# Patient Record
Sex: FEMALE | Race: BLACK OR AFRICAN AMERICAN | NOT HISPANIC OR LATINO | Employment: OTHER | URBAN - METROPOLITAN AREA
[De-identification: names, ages, dates, MRNs, and addresses within clinical notes are randomized per-mention and may not be internally consistent; named-entity substitution may affect disease eponyms.]

---

## 2017-01-20 RX ORDER — ATORVASTATIN CALCIUM 10 MG/1
10 TABLET, FILM COATED ORAL DAILY
Qty: 90 TABLET | Refills: 2 | Status: SHIPPED | OUTPATIENT
Start: 2017-01-20 | End: 2017-11-14 | Stop reason: SDUPTHER

## 2017-01-20 RX ORDER — ALLOPURINOL 100 MG/1
100 TABLET ORAL DAILY
Qty: 90 TABLET | Refills: 2 | Status: SHIPPED | OUTPATIENT
Start: 2017-01-20 | End: 2017-06-28 | Stop reason: SDUPTHER

## 2017-01-20 NOTE — TELEPHONE ENCOUNTER
----- Message from Alejandrina Mendoza sent at 1/19/2017 12:32 PM EST -----  Contact: [phone]  SHE WANTS TO GET A 90 DAY.     allopurinol (ZYLOPRIM) 100 MG tablet 90 tablet 1 12/5/2016      Sig - Route: Take 1 tablet by mouth Daily         atorvastatin (LIPITOR) 10 MG tablet 90 tablet 1 12/19/2016      Sig - Route: Take 1 tablet by mouth Daily. -     [id number] - [address] [phone] [facility] [phone]     Unable to leave message,phone rang several times. Sent Allopurinol and Atorvastatin to pharmacy

## 2017-01-31 ENCOUNTER — TELEPHONE (OUTPATIENT)
Dept: GASTROENTEROLOGY | Facility: CLINIC | Age: 67
End: 2017-01-31

## 2017-02-03 NOTE — TELEPHONE ENCOUNTER
Call to pt.  Advise per Dr Aly that all of the polyp(s) bx showed adenomatous change.  This is not cancerous, but is considered potentially precancerous.  F/u c/s in 3 yrs advised.  Pt verb understanding.    Message to Jennifer BANUELOS for 12/28/19 c/s recall.

## 2017-02-10 ENCOUNTER — OFFICE VISIT (OUTPATIENT)
Dept: FAMILY MEDICINE CLINIC | Facility: CLINIC | Age: 67
End: 2017-02-10

## 2017-02-10 VITALS
WEIGHT: 209.8 LBS | DIASTOLIC BLOOD PRESSURE: 78 MMHG | HEIGHT: 67 IN | BODY MASS INDEX: 32.93 KG/M2 | OXYGEN SATURATION: 97 % | TEMPERATURE: 98.5 F | HEART RATE: 85 BPM | RESPIRATION RATE: 16 BRPM | SYSTOLIC BLOOD PRESSURE: 130 MMHG

## 2017-02-10 DIAGNOSIS — H10.32 ACUTE CONJUNCTIVITIS OF LEFT EYE, UNSPECIFIED ACUTE CONJUNCTIVITIS TYPE: Primary | ICD-10-CM

## 2017-02-10 DIAGNOSIS — J01.00 ACUTE MAXILLARY SINUSITIS, RECURRENCE NOT SPECIFIED: ICD-10-CM

## 2017-02-10 PROCEDURE — 99213 OFFICE O/P EST LOW 20 MIN: CPT | Performed by: NURSE PRACTITIONER

## 2017-02-10 RX ORDER — AMOXICILLIN 875 MG/1
875 TABLET, COATED ORAL 2 TIMES DAILY
Qty: 14 TABLET | Refills: 0 | Status: SHIPPED | OUTPATIENT
Start: 2017-02-10 | End: 2017-02-18

## 2017-02-10 NOTE — PATIENT INSTRUCTIONS
Start amoxicillin 875mg 2x day for 7 days.   May take mucinex OTC as needed for cough.   Start allegra daily for about 2 weeks.   zaditor eye drops for allergic conjunctivitis, if no improvement in 2-3 days, call office will consider abx drop.   Increase fluid intake, get plenty of rest.   Patient agrees with plan of care and understands instructions. Call if worsening symptoms or any problems or concerns.

## 2017-02-10 NOTE — PROGRESS NOTES
Subjective   Danielle Torres is a 66 y.o. female.     History of Present Illness   C/o congestion, headache, started about 2 weeks ago, got better, then came back, she thinks she has a sinus infection. She has sinus pressure, nasal drainage, sore throat, sore neck. She states she has eye drainage, she has morning crusting, started last weekend, she placed a warm cloth on her eye which helped. She states it was both eyes, but now just the left eye. She get does get seasonal allergies She denies itching and redness. She was watery drainage in the left eye also. Denies change in vision. She does have a cough, productive, no color change. She has had a fever last week but has since resolved. She drinks a lot of water.   The following portions of the patient's history were reviewed and updated as appropriate: allergies, current medications, past family history, past medical history, past social history, past surgical history and problem list.    Review of Systems   Constitutional: Negative for chills, diaphoresis and fever.   HENT: Positive for congestion, rhinorrhea, sinus pressure and sore throat. Negative for ear pain.    Eyes: Positive for discharge (crusting). Negative for photophobia, pain, redness, itching and visual disturbance.   Respiratory: Positive for cough. Negative for shortness of breath.    Cardiovascular: Negative for chest pain.   Musculoskeletal: Negative for arthralgias and myalgias.   Skin: Negative for pallor.   Allergic/Immunologic: Positive for environmental allergies.   Neurological: Positive for headaches.   All other systems reviewed and are negative.      Objective   Physical Exam   Constitutional: She is oriented to person, place, and time. She appears well-developed and well-nourished.   HENT:   Head: Normocephalic.   Right Ear: Tympanic membrane and ear canal normal.   Left Ear: Tympanic membrane and ear canal normal.   Nose: Right sinus exhibits maxillary sinus tenderness. Left sinus  exhibits maxillary sinus tenderness.   Mouth/Throat: Uvula is midline, oropharynx is clear and moist and mucous membranes are normal.   Eyes: Pupils are equal, round, and reactive to light.   Neck: Neck supple.   Cardiovascular: Normal rate, regular rhythm and normal heart sounds.    Pulmonary/Chest: Effort normal and breath sounds normal. No respiratory distress. She has no wheezes.   Musculoskeletal: Normal range of motion.   Lymphadenopathy:     She has no cervical adenopathy.   Neurological: She is alert and oriented to person, place, and time.   Skin: Skin is warm and dry.   Psychiatric: She has a normal mood and affect. Her behavior is normal. Judgment and thought content normal.   Nursing note and vitals reviewed.      Assessment/Plan   Danielle was seen today for uri.    Diagnoses and all orders for this visit:    Acute conjunctivitis of left eye, unspecified acute conjunctivitis type    Acute maxillary sinusitis, recurrence not specified    Other orders  -     amoxicillin (AMOXIL) 875 MG tablet; Take 1 tablet by mouth 2 (Two) Times a Day for 7 days.      Start amoxicillin 875mg 2x day for 7 days.   May take mucinex OTC as needed for cough.   Start allegra daily for about 2 weeks.   zaditor eye drops for allergic conjunctivitis, if no improvement in 2-3 days, call office will consider abx drop.   Increase fluid intake, get plenty of rest.   Patient agrees with plan of care and understands instructions. Call if worsening symptoms or any problems or concerns.

## 2017-02-18 ENCOUNTER — OFFICE VISIT (OUTPATIENT)
Dept: FAMILY MEDICINE CLINIC | Facility: CLINIC | Age: 67
End: 2017-02-18

## 2017-02-18 VITALS
RESPIRATION RATE: 18 BRPM | BODY MASS INDEX: 32.79 KG/M2 | DIASTOLIC BLOOD PRESSURE: 74 MMHG | TEMPERATURE: 99.8 F | HEART RATE: 79 BPM | SYSTOLIC BLOOD PRESSURE: 132 MMHG | OXYGEN SATURATION: 98 % | WEIGHT: 208.9 LBS | HEIGHT: 67 IN

## 2017-02-18 DIAGNOSIS — E78.5 HYPERLIPIDEMIA, UNSPECIFIED HYPERLIPIDEMIA TYPE: ICD-10-CM

## 2017-02-18 DIAGNOSIS — J06.9 ACUTE URI: Primary | ICD-10-CM

## 2017-02-18 DIAGNOSIS — I10 ESSENTIAL HYPERTENSION: ICD-10-CM

## 2017-02-18 DIAGNOSIS — K21.9 GASTROESOPHAGEAL REFLUX DISEASE WITHOUT ESOPHAGITIS: ICD-10-CM

## 2017-02-18 PROBLEM — M1A.00X0 IDIOPATHIC CHRONIC GOUT: Status: ACTIVE | Noted: 2017-02-18

## 2017-02-18 PROCEDURE — 99213 OFFICE O/P EST LOW 20 MIN: CPT | Performed by: INTERNAL MEDICINE

## 2017-02-18 RX ORDER — PROMETHAZINE HYDROCHLORIDE AND CODEINE PHOSPHATE 6.25; 1 MG/5ML; MG/5ML
5 SYRUP ORAL EVERY 4 HOURS PRN
Qty: 118 ML | Refills: 0 | Status: SHIPPED | OUTPATIENT
Start: 2017-02-18 | End: 2018-01-05 | Stop reason: SDUPTHER

## 2017-02-18 RX ORDER — AMOXICILLIN 875 MG/1
875 TABLET, COATED ORAL 2 TIMES DAILY
Qty: 20 TABLET | Refills: 0 | Status: SHIPPED | OUTPATIENT
Start: 2017-02-18 | End: 2018-01-05 | Stop reason: SDUPTHER

## 2017-02-18 RX ORDER — AMLODIPINE BESYLATE 10 MG/1
TABLET ORAL
Qty: 90 TABLET | Refills: 0 | Status: SHIPPED | OUTPATIENT
Start: 2017-02-18 | End: 2017-06-27 | Stop reason: SDUPTHER

## 2017-02-18 NOTE — PROGRESS NOTES
Subjective   Danielle Torres is a 66 y.o. female.     History of Present Illness   Patient came in with symptoms of a URI.  She did have bilateral sinus tenderness and was given Amoxil.  She will follow-up in 4 days not better.    Much of this encounter note is an electronic transcription/translation of spoken language to printed text.  The electronic translation of spoken language may permit erroneous, or at times, nonsensical words or phrases to be inadvertently transcribed.  Although I have reviewed the note for such errors, some may still exist.  The following portions of the patient's history were reviewed and updated as appropriate: allergies, current medications, past family history, past medical history, past social history, past surgical history and problem list..    Review of Systems   Constitutional: Negative for fatigue and fever.   HENT: Positive for congestion and sinus pressure. Negative for trouble swallowing.    Eyes: Negative for discharge and visual disturbance.   Respiratory: Negative for choking and shortness of breath.    Cardiovascular: Negative for chest pain and palpitations.   Gastrointestinal: Negative for abdominal pain and blood in stool.   Endocrine: Negative.    Genitourinary: Negative for genital sores and hematuria.   Musculoskeletal: Negative for gait problem and joint swelling.   Skin: Negative for color change, pallor, rash and wound.   Allergic/Immunologic: Positive for environmental allergies. Negative for immunocompromised state.   Neurological: Negative for facial asymmetry and speech difficulty.   Psychiatric/Behavioral: Negative for hallucinations and suicidal ideas.       Objective   Physical Exam   Constitutional: She is oriented to person, place, and time. She appears well-developed and well-nourished.   HENT:   Head: Normocephalic.   Bilateral maxillary tenderness   Eyes: Conjunctivae are normal. Pupils are equal, round, and reactive to light.   Neck: Normal range of  motion. Neck supple.   Cardiovascular: Normal rate, regular rhythm and normal heart sounds.    Pulmonary/Chest: Effort normal and breath sounds normal.   Abdominal: Soft. Bowel sounds are normal.   Musculoskeletal: Normal range of motion.   Neurological: She is alert and oriented to person, place, and time.   Skin: Skin is warm and dry.   Psychiatric: She has a normal mood and affect. Her behavior is normal. Judgment and thought content normal.   Nursing note and vitals reviewed.      Assessment/Plan   Problems Addressed this Visit        Cardiovascular and Mediastinum    Hypertension    Hyperlipidemia       Digestive    Gastroesophageal reflux disease without esophagitis      Other Visit Diagnoses     Acute URI    -  Primary    Relevant Medications    amoxicillin (AMOXIL) 875 MG tablet

## 2017-02-23 ENCOUNTER — TELEPHONE (OUTPATIENT)
Dept: FAMILY MEDICINE CLINIC | Facility: CLINIC | Age: 67
End: 2017-02-23

## 2017-02-23 NOTE — TELEPHONE ENCOUNTER
----- Message from Rahat Arciniega MD sent at 2/23/2017  3:36 PM EST -----  Contact: [phone]  Continue see what patient needs  ----- Message -----     From: Joanna Pope MA     Sent: 2/23/2017  12:27 PM       To: Rahat Arciniega MD    2nd round antibotitcs feels a little better but still coughing up think phleem orange? What do you want her to do????  ----- Message -----     From: Bell De La Rosa     Sent: 2/23/2017  12:10 PM       To: Joanna Pope MA    PLEASE CALL PT      Per rls called pt f/u next week if no better cont. Med rest/fluids

## 2017-06-26 ENCOUNTER — LAB (OUTPATIENT)
Dept: FAMILY MEDICINE CLINIC | Facility: CLINIC | Age: 67
End: 2017-06-26

## 2017-06-26 DIAGNOSIS — I10 ESSENTIAL HYPERTENSION: Primary | ICD-10-CM

## 2017-06-26 DIAGNOSIS — E78.5 HYPERLIPIDEMIA, UNSPECIFIED HYPERLIPIDEMIA TYPE: ICD-10-CM

## 2017-06-26 DIAGNOSIS — R73.9 ELEVATED BLOOD SUGAR: ICD-10-CM

## 2017-06-26 LAB
ALBUMIN SERPL-MCNC: 4.3 G/DL (ref 3.5–5.2)
ALBUMIN/GLOB SERPL: 1.3 G/DL
ALP SERPL-CCNC: 60 U/L (ref 39–117)
ALT SERPL W P-5'-P-CCNC: 47 U/L (ref 1–33)
ANION GAP SERPL CALCULATED.3IONS-SCNC: 15.7 MMOL/L
AST SERPL-CCNC: 30 U/L (ref 1–32)
BILIRUB SERPL-MCNC: 0.3 MG/DL (ref 0.1–1.2)
BUN BLD-MCNC: 16 MG/DL (ref 8–23)
BUN/CREAT SERPL: 18 (ref 7–25)
CALCIUM SPEC-SCNC: 9.4 MG/DL (ref 8.6–10.5)
CHLORIDE SERPL-SCNC: 100 MMOL/L (ref 98–107)
CHOLEST SERPL-MCNC: 219 MG/DL (ref 0–200)
CO2 SERPL-SCNC: 23.3 MMOL/L (ref 22–29)
CREAT BLD-MCNC: 0.89 MG/DL (ref 0.57–1)
GFR SERPL CREATININE-BSD FRML MDRD: 77 ML/MIN/1.73
GLOBULIN UR ELPH-MCNC: 3.3 GM/DL
GLUCOSE BLD-MCNC: 103 MG/DL (ref 65–99)
HBA1C MFR BLD: 6.1 % (ref 4.8–5.6)
HDLC SERPL-MCNC: 51 MG/DL (ref 40–60)
LDLC SERPL CALC-MCNC: 140 MG/DL (ref 0–100)
LDLC/HDLC SERPL: 2.75 {RATIO}
POTASSIUM BLD-SCNC: 4 MMOL/L (ref 3.5–5.2)
PROT SERPL-MCNC: 7.6 G/DL (ref 6–8.5)
SODIUM BLD-SCNC: 139 MMOL/L (ref 136–145)
TRIGL SERPL-MCNC: 140 MG/DL (ref 0–150)
VLDLC SERPL-MCNC: 28 MG/DL (ref 5–40)

## 2017-06-26 PROCEDURE — 80061 LIPID PANEL: CPT | Performed by: NURSE PRACTITIONER

## 2017-06-26 PROCEDURE — 80053 COMPREHEN METABOLIC PANEL: CPT | Performed by: NURSE PRACTITIONER

## 2017-06-26 PROCEDURE — 83036 HEMOGLOBIN GLYCOSYLATED A1C: CPT | Performed by: NURSE PRACTITIONER

## 2017-06-27 RX ORDER — AMLODIPINE BESYLATE 10 MG/1
TABLET ORAL
Qty: 90 TABLET | Refills: 0 | Status: SHIPPED | OUTPATIENT
Start: 2017-06-27 | End: 2017-10-05 | Stop reason: SDUPTHER

## 2017-06-28 ENCOUNTER — TELEPHONE (OUTPATIENT)
Dept: FAMILY MEDICINE CLINIC | Facility: CLINIC | Age: 67
End: 2017-06-28

## 2017-06-28 RX ORDER — ALLOPURINOL 100 MG/1
100 TABLET ORAL DAILY
Qty: 90 TABLET | Refills: 2 | Status: SHIPPED | OUTPATIENT
Start: 2017-06-28 | End: 2017-10-20 | Stop reason: SDUPTHER

## 2017-06-28 NOTE — TELEPHONE ENCOUNTER
Pt informed    ---- Message from JACKLYN Alvarado sent at 6/27/2017  1:06 PM EDT -----  A1C 6.1 prediabetic, glu 103, liver enzyme sl elevated but stable, chol sl elevated, pls make FU apt to discuss

## 2017-08-11 ENCOUNTER — TELEPHONE (OUTPATIENT)
Dept: FAMILY MEDICINE CLINIC | Facility: CLINIC | Age: 67
End: 2017-08-11

## 2017-08-11 ENCOUNTER — OFFICE VISIT (OUTPATIENT)
Dept: FAMILY MEDICINE CLINIC | Facility: CLINIC | Age: 67
End: 2017-08-11

## 2017-08-11 VITALS
OXYGEN SATURATION: 99 % | SYSTOLIC BLOOD PRESSURE: 138 MMHG | BODY MASS INDEX: 32.33 KG/M2 | HEIGHT: 67 IN | HEART RATE: 70 BPM | TEMPERATURE: 98.2 F | RESPIRATION RATE: 16 BRPM | DIASTOLIC BLOOD PRESSURE: 80 MMHG | WEIGHT: 206 LBS

## 2017-08-11 DIAGNOSIS — Z00.00 MEDICARE ANNUAL WELLNESS VISIT, INITIAL: Primary | ICD-10-CM
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
## 2017-08-11 DIAGNOSIS — Z23 NEED FOR VACCINATION: ICD-10-CM

## 2017-08-11 PROCEDURE — 90471 IMMUNIZATION ADMIN: CPT | Performed by: NURSE PRACTITIONER

## 2017-08-11 PROCEDURE — 99213 OFFICE O/P EST LOW 20 MIN: CPT | Performed by: NURSE PRACTITIONER

## 2017-08-11 PROCEDURE — 90715 TDAP VACCINE 7 YRS/> IM: CPT | Performed by: NURSE PRACTITIONER

## 2017-08-11 PROCEDURE — G0438 PPPS, INITIAL VISIT: HCPCS | Performed by: NURSE PRACTITIONER

## 2017-08-11 RX ORDER — MUPIROCIN CALCIUM 20 MG/G
CREAM TOPICAL 2 TIMES DAILY
Qty: 15 G | Refills: 0 | Status: SHIPPED | OUTPATIENT
Start: 2017-08-11 | End: 2018-01-05

## 2017-08-11 NOTE — PATIENT INSTRUCTIONS
Medicare Wellness  Personal Prevention Plan of Service     Date of Office Visit:  2017  Encounter Provider:  JACKLYN Longoria  Place of Service:  [facility]  Patient Name: Danielle Torres  :  1950    As part of the Medicare Wellness portion of your visit today, we are providing you with this personalized preventive plan of services (PPPS). This plan is based upon recommendations of the [place] Preventive Services Task Force (USPSTF) and the Advisory Committee on Immunization Practices (ACIP).    This lists the preventive care services that should be considered, and provides dates of when you are due. Items listed as completed are up-to-date and do not require any further intervention.    Health Maintenance   Topic Date Due   • INFLUENZA VACCINE  2017   • PNEUMOCOCCAL VACCINES (65+ LOW/MEDIUM RISK) (2 of 2 - PPSV23) 2017   • DIABETIC EYE EXAM  2017   • HEMOGLOBIN A1C  2017   • MAMMOGRAM  2018   • MEDICARE ANNUAL WELLNESS  02/10/2018   • DIABETIC FOOT EXAM  02/10/2018   • URINE MICROALBUMIN  02/10/2018   • LIPID PANEL  2018   • COLONOSCOPY  2026   • TDAP/TD VACCINES (2 - Td) 02/10/2027   • HEPATITIS C SCREENING  Completed   • ZOSTER VACCINE  Addressed       No orders of the defined types were placed in this encounter.      No Follow-up on file.      bactroban cream to wound 2x day, clean dressing, until healed, keep covered with bandaide.  TDap today.  Increase fluid intake, get plenty of rest.   Patient agrees with plan of care and understands instructions. Call if worsening symptoms or any problems or concerns.

## 2017-08-11 NOTE — TELEPHONE ENCOUNTER
PT PHARMACY TOLD HER THEY NEED MORE INFO FOR THE ANTIBIOTIC CREAM THAT WAS CALLED IN TODAY PLEASE CALL AND TALK WITH PHARMACY     SONDRA ANGEL     [phone] OR CELL [phone]

## 2017-08-11 NOTE — PROGRESS NOTES
Subjective   Danielle Torres is a 67 y.o. female.     History of Present Illness   C/o wound, she states she was stuck by nail in the back of her sofa, she states it went in her left forearm, happened about 2 weeks ago, not healing well, redness around area, starting to scab, noticed pus yesterday. She is unsure if she is UTD on TDap.  She denies fever. She has cleaned the wound at home, she tried neosporin on this at home.   The following portions of the patient's history were reviewed and updated as appropriate: allergies, current medications, past family history, past medical history, past social history, past surgical history and problem list.    Review of Systems   Constitutional: Negative for chills, diaphoresis and fever.   Respiratory: Negative for shortness of breath.    Cardiovascular: Negative for chest pain.   Musculoskeletal: Negative for arthralgias and myalgias.   Skin: Positive for wound.   Neurological: Negative for dizziness, light-headedness and headaches.   All other systems reviewed and are negative.      Objective   Physical Exam   Constitutional: She is oriented to person, place, and time. She appears well-developed and well-nourished.   HENT:   Head: Normocephalic.   Eyes: Pupils are equal, round, and reactive to light.   Neck: Neck supple.   Cardiovascular: Normal rate, regular rhythm and normal heart sounds.    Pulmonary/Chest: Effort normal and breath sounds normal.   Musculoskeletal: Normal range of motion.   Neurological: She is alert and oriented to person, place, and time.   Skin: Skin is warm and dry.        Psychiatric: She has a normal mood and affect. Her behavior is normal. Judgment and thought content normal.   Nursing note and vitals reviewed.      Assessment/Plan   Danielle was seen today for puncture wound.    Diagnoses and all orders for this visit:    Medicare annual wellness visit, initial    Puncture wound of skin from metal nail    Need for vaccination    Other orders  -      mupirocin (BACTROBAN) 2 % cream; Apply  topically 2 (Two) Times a Day.  -     Tdap Vaccine Greater Than or Equal To 8yo IM      bactroban cream to wound 2x day, clean dressing, until healed, keep covered with bandaide.  TDap today, explained to patient TDap vs plain tetanus, she should like to get full TDap as she has grandchildren and not UTD that she knows of. No record of recent vaccine in chart.   Increase fluid intake, get plenty of rest.   Patient agrees with plan of care and understands instructions. Call if worsening symptoms or any problems or concerns.

## 2017-08-11 NOTE — PROGRESS NOTES
QUICK REFERENCE INFORMATION:  The ABCs of the Annual Wellness Visit    Initial Medicare Wellness Visit    HEALTH RISK ASSESSMENT    1950    Recent Hospitalizations:  No hospitalization(s) within the last year..        Current Medical Providers:  Patient Care Team:  Rahat Arciniega MD as PCP - General  JACKLYN Alvarado as Nurse Practitioner (Family Medicine)        Smoking Status:  History   Smoking Status   • Former Smoker   • Types: Cigarettes   Smokeless Tobacco   • Never Used       Alcohol Consumption:  History   Alcohol Use No       Depression Screen:   PHQ-9 Depression Screening 8/11/2017   Little interest or pleasure in doing things 0   Feeling down, depressed, or hopeless 0   Trouble falling or staying asleep, or sleeping too much 0   Feeling tired or having little energy 1   Poor appetite or overeating 0   Feeling bad about yourself - or that you are a failure or have let yourself or your family down 0   Trouble concentrating on things, such as reading the newspaper or watching television 0   Moving or speaking so slowly that other people could have noticed. Or the opposite - being so fidgety or restless that you have been moving around a lot more than usual 0   Thoughts that you would be better off dead, or of hurting yourself in some way 0   PHQ-9 Total Score 1   If you checked off any problems, how difficult have these problems made it for you to do your work, take care of things at home, or get along with other people? Not difficult at all       Health Habits and Functional and Cognitive Screening:  Functional & Cognitive Status 8/11/2017   Do you have difficulty preparing food and eating? No   Do you have difficulty bathing yourself? No   Do you have difficulty getting dressed? No   Do you have difficulty using the toilet? No   Do you have difficulty moving around from place to place? No   In the past year have you fallen or experienced a near fall? No   Do you need help using the  phone?  No   Are you deaf or do you have serious difficulty hearing?  No   Do you need help with transportation? No   Do you need help shopping? No   Do you need help preparing meals?  No   Do you need help with housework?  No   Do you need help with laundry? No   Do you need help taking your medications? No   Do you need help managing money? No   Do you have difficulty concentrating, remembering or making decisions? No       Health Habits  Current Diet: Unhealthy Diet  Dental Exam: Up to date  Eye Exam: Up to date  Exercise (times per week): 6 times per week  Current Exercise Activities Include: Gardening          Does the patient have evidence of cognitive impairment? No    Asiprin use counseling: Taking ASA appropriately as indicated      Recent Lab Results:    Visual Acuity:  No exam data present    Age-appropriate Screening Schedule:  Refer to the list below for future screening recommendations based on patient's age, sex and/or medical conditions. Orders for these recommended tests are listed in the plan section. The patient has been provided with a written plan.    Health Maintenance   Topic Date Due   • INFLUENZA VACCINE  09/01/2017   • PNEUMOCOCCAL VACCINES (65+ LOW/MEDIUM RISK) (2 of 2 - PPSV23) 11/01/2017   • DIABETIC EYE EXAM  12/01/2017   • HEMOGLOBIN A1C  12/26/2017   • MAMMOGRAM  01/01/2018   • DIABETIC FOOT EXAM  02/10/2018   • URINE MICROALBUMIN  02/10/2018   • LIPID PANEL  06/26/2018   • COLONOSCOPY  12/27/2026   • TDAP/TD VACCINES (2 - Td) 02/10/2027   • ZOSTER VACCINE  Addressed        Subjective   History of Present Illness    Danielle Torres is a 67 y.o. female who presents for an Annual Wellness Visit.    The following portions of the patient's history were reviewed and updated as appropriate: allergies, current medications, past family history, past medical history, past social history, past surgical history and problem list.    Outpatient Medications Prior to Visit   Medication Sig Dispense  "Refill   • allopurinol (ZYLOPRIM) 100 MG tablet Take 1 tablet by mouth Daily. 90 tablet 2   • amLODIPine (NORVASC) 10 MG tablet TAKE 1 TABLET BY MOUTH ONE TIME A DAY  90 tablet 0   • amoxicillin (AMOXIL) 875 MG tablet Take 1 tablet by mouth 2 (Two) Times a Day. 20 tablet 0   • aspirin 81 MG EC tablet Take 81 mg by mouth Daily.     • atorvastatin (LIPITOR) 10 MG tablet Take 1 tablet by mouth Daily. 90 tablet 2   • fexofenadine (ALLEGRA ALLERGY) 180 MG tablet Take 1 tablet by mouth daily. 30 tablet 5   • promethazine-codeine (PHENERGAN with CODEINE) 6.25-10 MG/5ML syrup Take 5 mL by mouth Every 4 (Four) Hours As Needed for cough. 118 mL 0     No facility-administered medications prior to visit.        Patient Active Problem List   Diagnosis   • Hypertension   • Hyperlipidemia   • Gastroesophageal reflux disease without esophagitis   • Allergic rhinitis   • Idiopathic chronic gout       Advance Care Planning:  has NO advance directive - information provided to the patient today    Identification of Risk Factors:  Risk factors include: weight , unhealthy diet and inactivity.    Review of Systems    Compared to one year ago, the patient feels her physical health is the same.  Compared to one year ago, the patient feels her mental health is the same.    Objective     Physical Exam    Vitals:    08/11/17 0956   BP: 138/80   BP Location: Left arm   Patient Position: Sitting   Cuff Size: Large Adult   Pulse: 70   Resp: 16   Temp: 98.2 °F (36.8 °C)   TempSrc: Oral   SpO2: 99%   Weight: 206 lb (93.4 kg)   Height: 67\" (170.2 cm)   PainSc: 0-No pain       Body mass index is 32.26 kg/(m^2).  Discussed the patient's BMI with her. The BMI is above average; BMI management plan is completed.    Assessment/Plan   Patient Self-Management and Personalized Health Advice  The patient has been provided with information about: diet, exercise, weight management and prevention of cardiac or vascular disease and preventive services including: "   · Advance directive, Exercise counseling provided, Nutrition counseling provided.    Visit Diagnoses:    ICD-10-CM ICD-9-CM   1. Puncture wound of skin from metal nail W45.0XXA 879.8   2. Need for vaccination Z23 V05.9       No orders of the defined types were placed in this encounter.      Outpatient Encounter Prescriptions as of 8/11/2017   Medication Sig Dispense Refill   • allopurinol (ZYLOPRIM) 100 MG tablet Take 1 tablet by mouth Daily. 90 tablet 2   • amLODIPine (NORVASC) 10 MG tablet TAKE 1 TABLET BY MOUTH ONE TIME A DAY  90 tablet 0   • amoxicillin (AMOXIL) 875 MG tablet Take 1 tablet by mouth 2 (Two) Times a Day. 20 tablet 0   • aspirin 81 MG EC tablet Take 81 mg by mouth Daily.     • atorvastatin (LIPITOR) 10 MG tablet Take 1 tablet by mouth Daily. 90 tablet 2   • fexofenadine (ALLEGRA ALLERGY) 180 MG tablet Take 1 tablet by mouth daily. 30 tablet 5   • promethazine-codeine (PHENERGAN with CODEINE) 6.25-10 MG/5ML syrup Take 5 mL by mouth Every 4 (Four) Hours As Needed for cough. 118 mL 0   • mupirocin (BACTROBAN) 2 % cream Apply  topically 2 (Two) Times a Day. 15 g 0     No facility-administered encounter medications on file as of 8/11/2017.        Reviewed use of high risk medication in the elderly: not applicable  Reviewed for potential of harmful drug interactions in the elderly: not applicable    Follow Up:  No Follow-up on file.     An After Visit Summary and PPPS with all of these plans were given to the patient.

## 2017-10-05 RX ORDER — AMLODIPINE BESYLATE 10 MG/1
TABLET ORAL
Qty: 90 TABLET | Refills: 2 | Status: SHIPPED | OUTPATIENT
Start: 2017-10-05 | End: 2018-07-18 | Stop reason: SDUPTHER

## 2017-10-17 ENCOUNTER — CLINICAL SUPPORT (OUTPATIENT)
Dept: FAMILY MEDICINE CLINIC | Facility: CLINIC | Age: 67
End: 2017-10-17

## 2017-10-17 PROCEDURE — G0008 ADMIN INFLUENZA VIRUS VAC: HCPCS | Performed by: INTERNAL MEDICINE

## 2017-10-20 RX ORDER — ALLOPURINOL 100 MG/1
100 TABLET ORAL DAILY
Qty: 90 TABLET | Refills: 3 | Status: SHIPPED | OUTPATIENT
Start: 2017-10-20 | End: 2019-01-09 | Stop reason: SDUPTHER

## 2017-11-14 RX ORDER — ATORVASTATIN CALCIUM 10 MG/1
TABLET, FILM COATED ORAL
Qty: 30 TABLET | Refills: 1 | Status: SHIPPED | OUTPATIENT
Start: 2017-11-14 | End: 2017-12-13 | Stop reason: SDUPTHER

## 2017-12-13 ENCOUNTER — TELEPHONE (OUTPATIENT)
Dept: FAMILY MEDICINE CLINIC | Facility: CLINIC | Age: 67
End: 2017-12-13

## 2017-12-13 RX ORDER — ATORVASTATIN CALCIUM 10 MG/1
10 TABLET, FILM COATED ORAL NIGHTLY
Qty: 30 TABLET | Refills: 5 | Status: SHIPPED | OUTPATIENT
Start: 2017-12-13 | End: 2018-01-05 | Stop reason: SDUPTHER

## 2017-12-13 NOTE — TELEPHONE ENCOUNTER
REFILL ON atorvastatin (LIPITOR) 10 MG tablet    PHARMACY IS SUPPOSE TO BE FAXING REQUEST. PATIENT STATED PHARMACY IS TAKING TO LONG TO GET FILLED

## 2018-01-05 ENCOUNTER — OFFICE VISIT (OUTPATIENT)
Dept: FAMILY MEDICINE CLINIC | Facility: CLINIC | Age: 68
End: 2018-01-05

## 2018-01-05 VITALS
TEMPERATURE: 98.7 F | HEIGHT: 67 IN | SYSTOLIC BLOOD PRESSURE: 124 MMHG | WEIGHT: 209 LBS | BODY MASS INDEX: 32.8 KG/M2 | DIASTOLIC BLOOD PRESSURE: 68 MMHG | OXYGEN SATURATION: 97 % | HEART RATE: 82 BPM

## 2018-01-05 DIAGNOSIS — J01.00 ACUTE MAXILLARY SINUSITIS, RECURRENCE NOT SPECIFIED: Primary | ICD-10-CM

## 2018-01-05 DIAGNOSIS — M1A.00X0 IDIOPATHIC CHRONIC GOUT WITHOUT TOPHUS, UNSPECIFIED SITE: ICD-10-CM

## 2018-01-05 DIAGNOSIS — J30.2 ACUTE SEASONAL ALLERGIC RHINITIS, UNSPECIFIED TRIGGER: ICD-10-CM

## 2018-01-05 DIAGNOSIS — E78.5 HYPERLIPIDEMIA, UNSPECIFIED HYPERLIPIDEMIA TYPE: ICD-10-CM

## 2018-01-05 DIAGNOSIS — R73.03 PREDIABETES: ICD-10-CM

## 2018-01-05 DIAGNOSIS — E66.09 CLASS 1 OBESITY DUE TO EXCESS CALORIES WITH SERIOUS COMORBIDITY AND BODY MASS INDEX (BMI) OF 32.0 TO 32.9 IN ADULT: ICD-10-CM

## 2018-01-05 DIAGNOSIS — I10 ESSENTIAL HYPERTENSION: ICD-10-CM

## 2018-01-05 PROBLEM — E66.811 CLASS 1 OBESITY DUE TO EXCESS CALORIES WITH SERIOUS COMORBIDITY AND BODY MASS INDEX (BMI) OF 32.0 TO 32.9 IN ADULT: Status: ACTIVE | Noted: 2018-01-05

## 2018-01-05 PROCEDURE — 99214 OFFICE O/P EST MOD 30 MIN: CPT | Performed by: NURSE PRACTITIONER

## 2018-01-05 RX ORDER — PROMETHAZINE HYDROCHLORIDE AND CODEINE PHOSPHATE 6.25; 1 MG/5ML; MG/5ML
5 SYRUP ORAL EVERY 4 HOURS PRN
Qty: 118 ML | Refills: 0 | Status: SHIPPED | OUTPATIENT
Start: 2018-01-05 | End: 2018-02-06 | Stop reason: SDUPTHER

## 2018-01-05 RX ORDER — AMOXICILLIN 875 MG/1
875 TABLET, COATED ORAL EVERY 12 HOURS SCHEDULED
Qty: 20 TABLET | Refills: 0 | Status: SHIPPED | OUTPATIENT
Start: 2018-01-05 | End: 2018-02-06

## 2018-01-05 RX ORDER — AMOXICILLIN 875 MG/1
875 TABLET, COATED ORAL EVERY 12 HOURS SCHEDULED
Qty: 20 TABLET | Refills: 0 | Status: SHIPPED | OUTPATIENT
Start: 2018-01-05 | End: 2018-01-05 | Stop reason: SDUPTHER

## 2018-01-05 RX ORDER — ATORVASTATIN CALCIUM 10 MG/1
10 TABLET, FILM COATED ORAL NIGHTLY
Qty: 30 TABLET | Refills: 5 | Status: SHIPPED | OUTPATIENT
Start: 2018-01-05 | End: 2018-05-18

## 2018-01-05 NOTE — PROGRESS NOTES
Subjective   Danielle Torres is a 67 y.o. female.     History of Present Illness   C/o sinus tendernesss and congestion, nasal congestion, ear congestion x 2 wks with HA, no hearing loss or ear dc, no sore throat but voice change and PND, with prod cough and some SOA, no wheezing, NKDA, no one sick at home, Last tx acute URI 02/17 amox 875 mg tolerated well, stopped taking flonase and allegra and thinks c/t sx  With chol on lipitor 10 mg nonfasting today  With gout stable on allopurinol 100 mg daily no recent flairs last uric acid 5 10/16  With HTN on amlodipine 10 mg daily no CP dizziness HA LE edema  With prediabetes last A1C 06/17 6.1 with weight gain 3 lbs since 08/17    The following portions of the patient's history were reviewed and updated as appropriate: allergies, current medications, past family history, past medical history, past social history, past surgical history and problem list.    Review of Systems   Constitutional: Negative for fever.   HENT: Positive for congestion, ear pain, facial swelling, postnasal drip, sinus pain, sinus pressure and voice change. Negative for dental problem, drooling, ear discharge, hearing loss, mouth sores, nosebleeds, rhinorrhea, sneezing, sore throat, tinnitus and trouble swallowing.    Respiratory: Positive for cough and shortness of breath. Negative for chest tightness and wheezing.    Cardiovascular: Negative for chest pain, palpitations and leg swelling.   Allergic/Immunologic: Positive for environmental allergies.   Neurological: Positive for headaches. Negative for dizziness.   All other systems reviewed and are negative.      Objective   Physical Exam   Constitutional: She is oriented to person, place, and time. She appears well-developed and well-nourished.   HENT:   Head: Normocephalic and atraumatic.   Right Ear: Hearing and tympanic membrane normal.   Left Ear: Hearing and tympanic membrane normal.   Nose: Mucosal edema present. Right sinus exhibits maxillary  sinus tenderness and frontal sinus tenderness. Left sinus exhibits maxillary sinus tenderness and frontal sinus tenderness.   Mouth/Throat: Posterior oropharyngeal erythema present. No oropharyngeal exudate or posterior oropharyngeal edema.   Eyes: Conjunctivae and EOM are normal. Pupils are equal, round, and reactive to light.   Neck: Normal range of motion. Neck supple. No thyromegaly present.   Cardiovascular: Normal rate, regular rhythm and normal heart sounds.    Pulmonary/Chest: Effort normal and breath sounds normal.   Musculoskeletal: Normal range of motion.   Lymphadenopathy:     She has cervical adenopathy.   Neurological: She is alert and oriented to person, place, and time.   Skin: Skin is warm and dry.   Psychiatric: She has a normal mood and affect. Her behavior is normal. Judgment and thought content normal.   Vitals reviewed.      Assessment/Plan   Danielle was seen today for sinusitis.    Diagnoses and all orders for this visit:    Acute maxillary sinusitis, recurrence not specified    Acute seasonal allergic rhinitis, unspecified trigger    Essential hypertension  -     Comprehensive Metabolic Panel    Hyperlipidemia, unspecified hyperlipidemia type  -     Comprehensive Metabolic Panel  -     Lipid Panel    Prediabetes  -     Hemoglobin A1c    Idiopathic chronic gout without tophus, unspecified site  -     Uric Acid    Class 1 obesity due to excess calories with serious comorbidity and body mass index (BMI) of 32.0 to 32.9 in adult    Other orders  -     promethazine-codeine (PHENERGAN with CODEINE) 6.25-10 MG/5ML syrup; Take 5 mL by mouth Every 4 (Four) Hours As Needed for Cough.  -     Discontinue: amoxicillin (AMOXIL) 875 MG tablet; Take 1 tablet by mouth Every 12 (Twelve) Hours.  -     amoxicillin (AMOXIL) 875 MG tablet; Take 1 tablet by mouth Every 12 (Twelve) Hours.  -     atorvastatin (LIPITOR) 10 MG tablet; Take 1 tablet by mouth Every Night.    erx amox 875 BID x 10 days, increase H20 and  rest, Rx promethazine/codeine may cause sedation, call or RTC if sx persist or worsen, restart flonase and allegra, check BP at home and RTC fasting check labs and call with results, cont all chronic dx meds, Enc healthy diet and regular exercise for wt loss

## 2018-01-05 NOTE — PATIENT INSTRUCTIONS
erx amox 875 BID x 10 days, increase H20 and rest, Rx promethazine/codeine may cause sedation, call or RTC if sx persist or worsen, restart flonase and allegra, check BP at home and RTC fasting check labs and call with results, cont all chronic dx meds, Enc healthy diet and regular exercise for wt loss

## 2018-02-06 ENCOUNTER — OFFICE VISIT (OUTPATIENT)
Dept: FAMILY MEDICINE CLINIC | Facility: CLINIC | Age: 68
End: 2018-02-06

## 2018-02-06 VITALS
OXYGEN SATURATION: 95 % | HEART RATE: 92 BPM | WEIGHT: 203 LBS | SYSTOLIC BLOOD PRESSURE: 110 MMHG | DIASTOLIC BLOOD PRESSURE: 62 MMHG | TEMPERATURE: 99.1 F | BODY MASS INDEX: 31.86 KG/M2 | HEIGHT: 67 IN

## 2018-02-06 DIAGNOSIS — J10.1 INFLUENZA B: Primary | ICD-10-CM

## 2018-02-06 DIAGNOSIS — J02.9 SORE THROAT: ICD-10-CM

## 2018-02-06 DIAGNOSIS — J01.00 ACUTE MAXILLARY SINUSITIS, RECURRENCE NOT SPECIFIED: ICD-10-CM

## 2018-02-06 LAB
FLUAV AG NPH QL: NEGATIVE
FLUBV AG NPH QL IA: POSITIVE
S PYO AG THROAT QL: NEGATIVE

## 2018-02-06 PROCEDURE — 87804 INFLUENZA ASSAY W/OPTIC: CPT | Performed by: NURSE PRACTITIONER

## 2018-02-06 PROCEDURE — 87880 STREP A ASSAY W/OPTIC: CPT | Performed by: NURSE PRACTITIONER

## 2018-02-06 PROCEDURE — 99213 OFFICE O/P EST LOW 20 MIN: CPT | Performed by: NURSE PRACTITIONER

## 2018-02-06 RX ORDER — OSELTAMIVIR PHOSPHATE 75 MG/1
75 CAPSULE ORAL 2 TIMES DAILY
Qty: 10 CAPSULE | Refills: 0 | Status: SHIPPED | OUTPATIENT
Start: 2018-02-06 | End: 2018-02-06 | Stop reason: SDUPTHER

## 2018-02-06 RX ORDER — AMOXICILLIN AND CLAVULANATE POTASSIUM 875; 125 MG/1; MG/1
1 TABLET, FILM COATED ORAL EVERY 12 HOURS SCHEDULED
Qty: 20 TABLET | Refills: 0 | Status: SHIPPED | OUTPATIENT
Start: 2018-02-06 | End: 2018-05-18

## 2018-02-06 RX ORDER — AMOXICILLIN AND CLAVULANATE POTASSIUM 875; 125 MG/1; MG/1
1 TABLET, FILM COATED ORAL EVERY 12 HOURS SCHEDULED
Qty: 20 TABLET | Refills: 0 | Status: SHIPPED | OUTPATIENT
Start: 2018-02-06 | End: 2018-02-06 | Stop reason: SDUPTHER

## 2018-02-06 RX ORDER — OSELTAMIVIR PHOSPHATE 75 MG/1
75 CAPSULE ORAL 2 TIMES DAILY
Qty: 10 CAPSULE | Refills: 0 | Status: SHIPPED | OUTPATIENT
Start: 2018-02-06 | End: 2018-05-18

## 2018-02-06 RX ORDER — PROMETHAZINE HYDROCHLORIDE AND CODEINE PHOSPHATE 6.25; 1 MG/5ML; MG/5ML
5 SYRUP ORAL EVERY 4 HOURS PRN
Qty: 120 ML | Refills: 0 | Status: SHIPPED | OUTPATIENT
Start: 2018-02-06 | End: 2018-05-18

## 2018-02-06 NOTE — PROGRESS NOTES
Subjective   Danielle Torres is a 67 y.o. female.     History of Present Illness   C/o body aches, HA and prod cough x 5 days, using OTC mucinex somewhat helps,  also sick, was also tx sinusitis 01/05/18 amox and cheratussin helped when was on but returned now with sinus tenderness congestion, sore throat, and ear pain, with fatigue and poor appetite, no diarrhea or abd pain, got flu shot this year    The following portions of the patient's history were reviewed and updated as appropriate: allergies, current medications, past family history, past medical history, past social history, past surgical history and problem list.    Review of Systems   Constitutional: Positive for appetite change, chills, diaphoresis, fatigue and fever. Negative for activity change.   HENT: Positive for ear pain, facial swelling, postnasal drip, sinus pain, sinus pressure, sore throat and voice change. Negative for congestion, dental problem, drooling, ear discharge, hearing loss, mouth sores, nosebleeds, rhinorrhea, sneezing, tinnitus and trouble swallowing.    Respiratory: Positive for cough. Negative for apnea, choking, chest tightness, shortness of breath, wheezing and stridor.    Cardiovascular: Negative for chest pain, palpitations and leg swelling.   Gastrointestinal: Positive for constipation. Negative for abdominal pain, anal bleeding, blood in stool, diarrhea, nausea, rectal pain and vomiting.   Musculoskeletal: Positive for arthralgias and myalgias. Negative for back pain, gait problem and joint swelling.   Neurological: Positive for headaches. Negative for dizziness.   All other systems reviewed and are negative.      Objective   Physical Exam   Constitutional: She is oriented to person, place, and time. She appears well-developed and well-nourished.   HENT:   Head: Normocephalic and atraumatic.   Right Ear: Hearing and tympanic membrane normal.   Left Ear: Hearing and tympanic membrane normal.   Nose: Mucosal edema  present. Right sinus exhibits maxillary sinus tenderness and frontal sinus tenderness. Left sinus exhibits maxillary sinus tenderness and frontal sinus tenderness.   Mouth/Throat: Posterior oropharyngeal erythema present. No oropharyngeal exudate or posterior oropharyngeal edema.   Eyes: Conjunctivae and EOM are normal. Pupils are equal, round, and reactive to light.   Neck: Normal range of motion. Neck supple. No thyromegaly present.   Cardiovascular: Normal rate, regular rhythm and normal heart sounds.    Pulmonary/Chest: Effort normal and breath sounds normal.   Musculoskeletal: Normal range of motion.   Lymphadenopathy:     She has cervical adenopathy.   Neurological: She is alert and oriented to person, place, and time.   Skin: Skin is warm and dry.   Psychiatric: She has a normal mood and affect. Her behavior is normal. Judgment and thought content normal.   Vitals reviewed.      Assessment/Plan   Danielle was seen today for cough.    Diagnoses and all orders for this visit:    Influenza B  -     Influenza Antigen, Rapid - Swab, Nasopharynx    Sore throat  -     Rapid Strep A Screen - Swab, Throat    Acute maxillary sinusitis, recurrence not specified    Other orders  -     Discontinue: oseltamivir (TAMIFLU) 75 MG capsule; Take 1 capsule by mouth 2 (Two) Times a Day.  -     promethazine-codeine (PHENERGAN with CODEINE) 6.25-10 MG/5ML syrup; Take 5 mL by mouth Every 4 (Four) Hours As Needed for Cough.  -     Discontinue: amoxicillin-clavulanate (AUGMENTIN) 875-125 MG per tablet; Take 1 tablet by mouth Every 12 (Twelve) Hours.  -     oseltamivir (TAMIFLU) 75 MG capsule; Take 1 capsule by mouth 2 (Two) Times a Day.  -     amoxicillin-clavulanate (AUGMENTIN) 875-125 MG per tablet; Take 1 tablet by mouth Every 12 (Twelve) Hours.    erx tamiflu 75 mg BID x 5 days, erx augmentin 875/125 BID x 10 days, increase H20 and rest, Rx cheratussin cough syrup may cause sedation, reviewed household prevention, call or RTC if sx  persist or worsen

## 2018-02-06 NOTE — PATIENT INSTRUCTIONS
erx tamiflu 75 mg BID x 5 days, erx augmentin 875/125 BID x 10 days, increase H20 and rest, Rx cheratussin cough syrup may cause sedation, reviewed household prevention, call or RTC if sx persist or worsen

## 2018-05-18 ENCOUNTER — OFFICE VISIT (OUTPATIENT)
Dept: FAMILY MEDICINE CLINIC | Facility: CLINIC | Age: 68
End: 2018-05-18

## 2018-05-18 VITALS
BODY MASS INDEX: 32.65 KG/M2 | HEIGHT: 67 IN | RESPIRATION RATE: 18 BRPM | DIASTOLIC BLOOD PRESSURE: 80 MMHG | TEMPERATURE: 99.4 F | SYSTOLIC BLOOD PRESSURE: 140 MMHG | HEART RATE: 78 BPM | WEIGHT: 208 LBS | OXYGEN SATURATION: 100 %

## 2018-05-18 DIAGNOSIS — I10 ESSENTIAL HYPERTENSION: ICD-10-CM

## 2018-05-18 DIAGNOSIS — M1A.00X0 IDIOPATHIC CHRONIC GOUT WITHOUT TOPHUS, UNSPECIFIED SITE: ICD-10-CM

## 2018-05-18 DIAGNOSIS — J30.2 CHRONIC SEASONAL ALLERGIC RHINITIS, UNSPECIFIED TRIGGER: ICD-10-CM

## 2018-05-18 DIAGNOSIS — J01.00 ACUTE MAXILLARY SINUSITIS, RECURRENCE NOT SPECIFIED: Primary | ICD-10-CM

## 2018-05-18 DIAGNOSIS — R73.03 PREDIABETES: ICD-10-CM

## 2018-05-18 DIAGNOSIS — Z12.31 ENCOUNTER FOR SCREENING MAMMOGRAM FOR BREAST CANCER: ICD-10-CM

## 2018-05-18 DIAGNOSIS — E66.09 CLASS 1 OBESITY DUE TO EXCESS CALORIES WITH SERIOUS COMORBIDITY AND BODY MASS INDEX (BMI) OF 32.0 TO 32.9 IN ADULT: ICD-10-CM

## 2018-05-18 DIAGNOSIS — E78.5 HYPERLIPIDEMIA, UNSPECIFIED HYPERLIPIDEMIA TYPE: ICD-10-CM

## 2018-05-18 PROCEDURE — 99214 OFFICE O/P EST MOD 30 MIN: CPT | Performed by: NURSE PRACTITIONER

## 2018-05-18 RX ORDER — AMOXICILLIN 875 MG/1
875 TABLET, COATED ORAL EVERY 12 HOURS SCHEDULED
Qty: 20 TABLET | Refills: 0 | Status: SHIPPED | OUTPATIENT
Start: 2018-05-18 | End: 2018-06-04

## 2018-05-18 RX ORDER — FEXOFENADINE HCL 180 MG/1
180 TABLET ORAL DAILY
Qty: 30 TABLET | Refills: 5 | Status: SHIPPED | OUTPATIENT
Start: 2018-05-18 | End: 2018-12-19 | Stop reason: SDUPTHER

## 2018-05-18 RX ORDER — FLUTICASONE PROPIONATE 50 MCG
2 SPRAY, SUSPENSION (ML) NASAL DAILY
Qty: 1 BOTTLE | Refills: 5 | Status: SHIPPED | OUTPATIENT
Start: 2018-05-18 | End: 2018-06-17

## 2018-05-18 RX ORDER — COLESEVELAM 180 1/1
1875 TABLET ORAL 2 TIMES DAILY WITH MEALS
COMMUNITY
End: 2018-06-04 | Stop reason: SDUPTHER

## 2018-05-18 NOTE — PROGRESS NOTES
Subjective   Danielle Torres is a 68 y.o. female.     History of Present Illness   C/o L sided nodules on neck x 5 days but thinks getting smaller, with chronic allergies was taking allergra 180 mg but stopped and thinks worsening, sometimes uses NS wash, no ear pain or sore throat, with sinus tenderness and congestion No wheezing, SOA, or coughing, with low grade fever, allergic latex, last tx sinusitis 01/18 augmentin too strong, prev amox 875 08/17 work better and tolerated  With chol was taking lipitor but missed doses x 2-3 wks and thinks c/t chronic cough so stopped now taking welchol > 2 mo nonfasting today  With HTN on amlodipine 10 mg daily no CP dizziness HA LE edema, rarely checks BP at home, with 5 lbs weight gain since 02/18, thinks elevated d/t stress getting here on time today  With hx of gout on black cherry and allopurinol 100 mg daily and denies recent flair, wondering if still needs to be on medicine  With hx of prediabetes last checked 06/17 6.1  Last mammo > 2 years DMIA no sx of breast pain, lumps, nipple dc    The following portions of the patient's history were reviewed and updated as appropriate: allergies, current medications, past family history, past medical history, past social history, past surgical history and problem list.    Review of Systems   Constitutional: Positive for fever. Negative for fatigue.   HENT: Positive for congestion, facial swelling, postnasal drip, rhinorrhea, sinus pain and sinus pressure. Negative for dental problem, drooling, ear discharge, ear pain, hearing loss, mouth sores, nosebleeds, sneezing, sore throat, tinnitus, trouble swallowing and voice change.    Respiratory: Negative for cough, chest tightness, shortness of breath and wheezing.    Cardiovascular: Negative for chest pain, palpitations and leg swelling.   Musculoskeletal: Negative for arthralgias and back pain.   Skin: Positive for color change.   Allergic/Immunologic: Positive for environmental  allergies.   Neurological: Negative for dizziness and headaches.   Hematological: Positive for adenopathy. Does not bruise/bleed easily.   All other systems reviewed and are negative.      Objective   Physical Exam   Constitutional: She is oriented to person, place, and time. She appears well-developed and well-nourished.   HENT:   Head: Normocephalic and atraumatic.   Right Ear: Hearing and tympanic membrane normal.   Left Ear: Hearing and tympanic membrane normal.   Nose: Mucosal edema present. Right sinus exhibits maxillary sinus tenderness and frontal sinus tenderness. Left sinus exhibits maxillary sinus tenderness and frontal sinus tenderness.   Mouth/Throat: Posterior oropharyngeal erythema present. No oropharyngeal exudate or posterior oropharyngeal edema.   Eyes: Conjunctivae and EOM are normal. Pupils are equal, round, and reactive to light.   Neck: Normal range of motion. Neck supple. No thyromegaly present.   Cardiovascular: Normal rate, regular rhythm and normal heart sounds.    Pulmonary/Chest: Effort normal and breath sounds normal.   Musculoskeletal: Normal range of motion.   Lymphadenopathy:     She has cervical adenopathy (left).   Neurological: She is alert and oriented to person, place, and time.   Skin: Skin is warm and dry.   Psychiatric: She has a normal mood and affect. Her behavior is normal. Judgment and thought content normal.   Vitals reviewed.      Assessment/Plan   Danielle was seen today for knots side of neck, hyperlipidemia and cough.    Diagnoses and all orders for this visit:    Acute maxillary sinusitis, recurrence not specified  -     CBC & Differential    Chronic seasonal allergic rhinitis, unspecified trigger    Hyperlipidemia, unspecified hyperlipidemia type    Encounter for screening mammogram for breast cancer  -     Mammo Screening Bilateral With CAD; Future    Class 1 obesity due to excess calories with serious comorbidity and body mass index (BMI) of 32.0 to 32.9 in  adult    Prediabetes    Essential hypertension    Idiopathic chronic gout without tophus, unspecified site    Other orders  -     fexofenadine (ALLEGRA ALLERGY) 180 MG tablet; Take 1 tablet by mouth Daily.  -     fluticasone (FLONASE) 50 MCG/ACT nasal spray; 2 sprays into each nostril Daily for 30 days. Administer 2 sprays in each nostril for each dose.  -     amoxicillin (AMOXIL) 875 MG tablet; Take 1 tablet by mouth Every 12 (Twelve) Hours.    erx amox 875 BID x 10 days, increase H20 and rest, call or RTC if sx persist or worsen, restart allegra and flonase NS, RTC fasting check labs and call with results, cont all chronic dz meds and consider stop allopurinol pending lab, order overdue mammo defer CBE today, Enc healthy diet and regular exercise for wt loss, check BP at home and log

## 2018-05-18 NOTE — PATIENT INSTRUCTIONS
erx amox 875 BID x 10 days, increase H20 and rest, call or RTC if sx persist or worsen, restart allegra and flonase NS, RTC fasting check labs and call with results, cont all chronic dz meds and consider stop allopurinol pending lab, order overdue mammo defer CBE today, Enc healthy diet and regular exercise for wt loss, check BP at home and log

## 2018-05-31 ENCOUNTER — HOSPITAL ENCOUNTER (OUTPATIENT)
Dept: MAMMOGRAPHY | Facility: HOSPITAL | Age: 68
Discharge: HOME | End: 2018-05-31
Admitting: NURSE PRACTITIONER

## 2018-05-31 DIAGNOSIS — Z12.31 ENCOUNTER FOR SCREENING MAMMOGRAM FOR BREAST CANCER: ICD-10-CM

## 2018-05-31 PROCEDURE — 77067 SCR MAMMO BI INCL CAD: CPT

## 2018-06-04 ENCOUNTER — TELEPHONE (OUTPATIENT)
Dept: FAMILY MEDICINE CLINIC | Facility: CLINIC | Age: 68
End: 2018-06-04

## 2018-06-04 ENCOUNTER — OFFICE VISIT (OUTPATIENT)
Dept: FAMILY MEDICINE CLINIC | Facility: CLINIC | Age: 68
End: 2018-06-04

## 2018-06-04 VITALS
OXYGEN SATURATION: 98 % | BODY MASS INDEX: 32.02 KG/M2 | WEIGHT: 204 LBS | SYSTOLIC BLOOD PRESSURE: 120 MMHG | TEMPERATURE: 99 F | DIASTOLIC BLOOD PRESSURE: 74 MMHG | HEART RATE: 93 BPM | HEIGHT: 67 IN

## 2018-06-04 DIAGNOSIS — R59.0 CERVICAL LYMPHADENOPATHY: ICD-10-CM

## 2018-06-04 DIAGNOSIS — N30.00 ACUTE CYSTITIS WITHOUT HEMATURIA: Primary | ICD-10-CM

## 2018-06-04 DIAGNOSIS — E78.5 HYPERLIPIDEMIA, UNSPECIFIED HYPERLIPIDEMIA TYPE: ICD-10-CM

## 2018-06-04 DIAGNOSIS — D72.819 LEUKOPENIA, UNSPECIFIED TYPE: ICD-10-CM

## 2018-06-04 LAB
ALBUMIN SERPL-MCNC: 4.3 G/DL (ref 3.5–5.2)
ALBUMIN/GLOB SERPL: 1.3 G/DL
ALP SERPL-CCNC: 59 U/L (ref 39–117)
ALT SERPL W P-5'-P-CCNC: 29 U/L (ref 1–33)
ANION GAP SERPL CALCULATED.3IONS-SCNC: 16.8 MMOL/L
AST SERPL-CCNC: 20 U/L (ref 1–32)
BACTERIA UR QL AUTO: ABNORMAL /HPF
BILIRUB SERPL-MCNC: 0.4 MG/DL (ref 0.1–1.2)
BILIRUB UR QL STRIP: ABNORMAL
BUN BLD-MCNC: 12 MG/DL (ref 8–23)
BUN/CREAT SERPL: 12.8 (ref 7–25)
CALCIUM SPEC-SCNC: 9.7 MG/DL (ref 8.6–10.5)
CHLORIDE SERPL-SCNC: 101 MMOL/L (ref 98–107)
CLARITY UR: CLEAR
CO2 SERPL-SCNC: 26.2 MMOL/L (ref 22–29)
COLOR UR: YELLOW
CREAT BLD-MCNC: 0.94 MG/DL (ref 0.57–1)
ERYTHROCYTE [DISTWIDTH] IN BLOOD BY AUTOMATED COUNT: 15.6 % (ref 4.5–15)
GFR SERPL CREATININE-BSD FRML MDRD: 72 ML/MIN/1.73
GLOBULIN UR ELPH-MCNC: 3.4 GM/DL
GLUCOSE BLD-MCNC: 93 MG/DL (ref 65–99)
GLUCOSE UR STRIP-MCNC: NEGATIVE MG/DL
HBA1C MFR BLD: 5.6 % (ref 4.8–5.6)
HCT VFR BLD AUTO: 37.2 % (ref 31–42)
HGB BLD-MCNC: 12.3 G/DL (ref 12–18)
HGB UR QL STRIP.AUTO: NEGATIVE
KETONES UR QL STRIP: ABNORMAL
LEUKOCYTE ESTERASE UR QL STRIP.AUTO: ABNORMAL
LYMPHOCYTES # BLD AUTO: 1.2 10*3/MM3 (ref 1.2–3.4)
LYMPHOCYTES NFR BLD AUTO: 31.3 % (ref 21–51)
MCH RBC QN AUTO: 26.9 PG (ref 26.1–33.1)
MCHC RBC AUTO-ENTMCNC: 33 G/DL (ref 33–37)
MCV RBC AUTO: 81.4 FL (ref 80–99)
MONOCYTES # BLD AUTO: 0.3 10*3/MM3 (ref 0.1–0.6)
MONOCYTES NFR BLD AUTO: 8.8 % (ref 2–9)
NEUTROPHILS # BLD AUTO: 2.3 10*3/MM3 (ref 1.4–6.5)
NEUTROPHILS NFR BLD AUTO: 59.9 % (ref 42–75)
NITRITE UR QL STRIP: NEGATIVE
PH UR STRIP.AUTO: 5.5 [PH] (ref 4.6–8)
PLATELET # BLD AUTO: 283 10*3/MM3 (ref 150–450)
PMV BLD AUTO: 6.5 FL (ref 7.1–10.5)
POTASSIUM BLD-SCNC: 4 MMOL/L (ref 3.5–5.2)
PROT SERPL-MCNC: 7.7 G/DL (ref 6–8.5)
PROT UR QL STRIP: ABNORMAL
RBC # BLD AUTO: 4.56 10*6/MM3 (ref 4–6)
RBC # UR: ABNORMAL /HPF
REF LAB TEST METHOD: ABNORMAL
SODIUM BLD-SCNC: 140 MMOL/L (ref 136–145)
SP GR UR STRIP: 1.02 (ref 1–1.03)
SQUAMOUS #/AREA URNS HPF: ABNORMAL /HPF
URATE SERPL-MCNC: 4.3 MG/DL (ref 2.4–5.7)
UROBILINOGEN UR QL STRIP: ABNORMAL
WBC NRBC COR # BLD: 3.9 10*3/MM3 (ref 4.5–10)
WBC UR QL AUTO: ABNORMAL /HPF

## 2018-06-04 PROCEDURE — 85025 COMPLETE CBC W/AUTO DIFF WBC: CPT | Performed by: NURSE PRACTITIONER

## 2018-06-04 PROCEDURE — 81001 URINALYSIS AUTO W/SCOPE: CPT | Performed by: NURSE PRACTITIONER

## 2018-06-04 PROCEDURE — 83036 HEMOGLOBIN GLYCOSYLATED A1C: CPT | Performed by: NURSE PRACTITIONER

## 2018-06-04 PROCEDURE — 80053 COMPREHEN METABOLIC PANEL: CPT | Performed by: NURSE PRACTITIONER

## 2018-06-04 PROCEDURE — 36415 COLL VENOUS BLD VENIPUNCTURE: CPT | Performed by: NURSE PRACTITIONER

## 2018-06-04 PROCEDURE — 84550 ASSAY OF BLOOD/URIC ACID: CPT | Performed by: NURSE PRACTITIONER

## 2018-06-04 PROCEDURE — 99213 OFFICE O/P EST LOW 20 MIN: CPT | Performed by: NURSE PRACTITIONER

## 2018-06-04 RX ORDER — CIPROFLOXACIN 500 MG/1
500 TABLET, FILM COATED ORAL 2 TIMES DAILY
Qty: 14 TABLET | Refills: 0 | Status: SHIPPED | OUTPATIENT
Start: 2018-06-04 | End: 2018-12-19

## 2018-06-04 RX ORDER — CIPROFLOXACIN 500 MG/1
500 TABLET, FILM COATED ORAL 2 TIMES DAILY
Qty: 14 TABLET | Refills: 0 | Status: SHIPPED | OUTPATIENT
Start: 2018-06-04 | End: 2018-06-04 | Stop reason: SDUPTHER

## 2018-06-04 RX ORDER — COLESEVELAM 180 1/1
1875 TABLET ORAL 2 TIMES DAILY WITH MEALS
Qty: 180 TABLET | Refills: 5 | Status: SHIPPED | OUTPATIENT
Start: 2018-06-04 | End: 2019-10-09

## 2018-06-04 NOTE — PATIENT INSTRUCTIONS
erx cipro 500 BID x 1 wk, Wipe front to back, increase H20 8 glasses day, urinate after intercourse, enc freq toileting, call or RTC if sx persist or worsen, check labs and call with results refill welchol will check fasting chol NCV

## 2018-06-04 NOTE — PROGRESS NOTES
Subjective   Danielle Torres is a 68 y.o. female.     History of Present Illness   Here to FU on dysuria x 1 wk, with urin freq and urgency x 1 wk,  no recent intercourse, no new sexual partners, no blood in urine, no vag dc, odor, itch pain  Was seen month for sinus infection and tx amox and resolved sx but with some lingering lymph nodes swollen, states thinks improved from last mo but not fully resolved, no sore throat, with allergies and congestion but no sinus tenderness, ear pain, cough wheezing SOA or fevers, no fam hx of lymphoma or ca, last WBC 3.5 12/16  With chol on welchol request refill nonfasting today for labs     The following portions of the patient's history were reviewed and updated as appropriate: allergies, current medications, past family history, past medical history, past social history, past surgical history and problem list.    Review of Systems   Constitutional: Negative for fever.   HENT: Positive for postnasal drip. Negative for congestion, dental problem, drooling, ear discharge, ear pain, facial swelling, hearing loss, mouth sores, nosebleeds, rhinorrhea, sinus pain, sinus pressure, sneezing, sore throat, tinnitus, trouble swallowing and voice change.    Respiratory: Negative for cough, shortness of breath and wheezing.    Cardiovascular: Negative for chest pain, palpitations and leg swelling.   Genitourinary: Positive for dysuria.   Neurological: Negative for dizziness and headaches.   Hematological: Positive for adenopathy. Does not bruise/bleed easily.   All other systems reviewed and are negative.      Objective   Physical Exam   Constitutional: She is oriented to person, place, and time. She appears well-developed and well-nourished.   HENT:   Head: Normocephalic and atraumatic.   Eyes: Conjunctivae and EOM are normal. Pupils are equal, round, and reactive to light.   Neck: Normal range of motion. Neck supple. No thyromegaly present.   Cardiovascular: Normal rate, regular  rhythm and normal heart sounds.    Pulmonary/Chest: Effort normal and breath sounds normal.   Abdominal: Soft. She exhibits no distension and no mass. There is no tenderness. There is no rebound, no guarding and no CVA tenderness. No hernia.   Musculoskeletal: Normal range of motion.   Lymphadenopathy:     She has cervical adenopathy (left sided).   Neurological: She is alert and oriented to person, place, and time.   Skin: Skin is warm and dry.   Psychiatric: She has a normal mood and affect. Her behavior is normal. Judgment and thought content normal.   Vitals reviewed.      Assessment/Plan   Danielle was seen today for urinary tract infection.    Diagnoses and all orders for this visit:    Acute cystitis without hematuria  -     Urinalysis With Microscopic - Urine, Clean Catch  -     Urinalysis - Urine, Clean Catch  -     Urinalysis, Microscopic Only - Urine, Clean Catch    Hyperlipidemia, unspecified hyperlipidemia type  -     Lipid Panel    Cervical lymphadenopathy  -     US Head Neck Soft Tissue; Future    Leukopenia, unspecified type  -     US Head Neck Soft Tissue; Future    Other orders  -     colesevelam (WELCHOL) 625 MG tablet; Take 3 tablets by mouth 2 (Two) Times a Day With Meals.  -     Discontinue: ciprofloxacin (CIPRO) 500 MG tablet; Take 1 tablet by mouth 2 (Two) Times a Day.  -     ciprofloxacin (CIPRO) 500 MG tablet; Take 1 tablet by mouth 2 (Two) Times a Day.    erx cipro 500 BID x 1 wk, Wipe front to back, increase H20 8 glasses day, urinate after intercourse, enc freq toileting, call or RTC if sx persist or worsen, check labs and call with results refill welchol will check fasting chol NCV

## 2018-06-04 NOTE — TELEPHONE ENCOUNTER
Pt informed----- Message from JACKLYN Alvarado sent at 6/4/2018  1:18 PM EDT -----  mammo normal, negative

## 2018-06-05 ENCOUNTER — TELEPHONE (OUTPATIENT)
Dept: FAMILY MEDICINE CLINIC | Facility: CLINIC | Age: 68
End: 2018-06-05

## 2018-06-05 DIAGNOSIS — D72.819 LEUKOPENIA, UNSPECIFIED TYPE: Primary | ICD-10-CM

## 2018-06-05 NOTE — TELEPHONE ENCOUNTER
BS, kidney, liver, uric acid normal. No longer prediabetic- good job and cont healthy diet and regular exercise. Your WBC is sl low 3.9 and was low in 2016 3.5, RTC 1 mo lab only recheck to see if normalizes

## 2018-06-07 ENCOUNTER — HOSPITAL ENCOUNTER (OUTPATIENT)
Dept: ULTRASOUND IMAGING | Facility: HOSPITAL | Age: 68
Discharge: HOME | End: 2018-06-07
Admitting: NURSE PRACTITIONER

## 2018-06-07 DIAGNOSIS — D72.819 LEUKOPENIA, UNSPECIFIED TYPE: ICD-10-CM

## 2018-06-07 DIAGNOSIS — R59.0 CERVICAL LYMPHADENOPATHY: ICD-10-CM

## 2018-06-07 PROCEDURE — 76536 US EXAM OF HEAD AND NECK: CPT

## 2018-06-11 ENCOUNTER — TELEPHONE (OUTPATIENT)
Dept: FAMILY MEDICINE CLINIC | Facility: CLINIC | Age: 68
End: 2018-06-11

## 2018-06-11 DIAGNOSIS — R59.9 ENLARGED LYMPH NODE: Primary | ICD-10-CM

## 2018-06-11 DIAGNOSIS — R93.89 ABNORMAL ULTRASOUND: ICD-10-CM

## 2018-06-11 NOTE — TELEPHONE ENCOUNTER
Your US shows prominent but normal lymph node and recommend FU in 1-2 mo to monitor size, I am going to refer you to ENT for monitoring

## 2018-07-06 ENCOUNTER — TELEPHONE (OUTPATIENT)
Dept: FAMILY MEDICINE CLINIC | Facility: CLINIC | Age: 68
End: 2018-07-06

## 2018-07-06 NOTE — TELEPHONE ENCOUNTER
Was originally on atorvastatin, and then switched to welchol, and it is over 1500.00 for three month she cant afford, even though the other caused a cough she wants it back atorvastatin, for her cholesterol, she can afford it and the cough will not bother her. Please call in today and let her know if you did or didn't bc she hasn't been on anything in a while and knows she needs to be.

## 2018-07-09 NOTE — TELEPHONE ENCOUNTER
She is overdue for fasting chol labs, please make lab only to come in, lipid order already in chart and I can review labs and make decision for med based on labs, thanks

## 2018-07-18 ENCOUNTER — TELEPHONE (OUTPATIENT)
Dept: FAMILY MEDICINE CLINIC | Facility: CLINIC | Age: 68
End: 2018-07-18

## 2018-07-18 RX ORDER — AMLODIPINE BESYLATE 10 MG/1
TABLET ORAL
Qty: 90 TABLET | Refills: 1 | Status: SHIPPED | OUTPATIENT
Start: 2018-07-18 | End: 2018-07-18 | Stop reason: SDUPTHER

## 2018-07-18 RX ORDER — AMLODIPINE BESYLATE 10 MG/1
10 TABLET ORAL DAILY
Qty: 90 TABLET | Refills: 1 | Status: SHIPPED | OUTPATIENT
Start: 2018-07-18 | End: 2018-12-19 | Stop reason: SDUPTHER

## 2018-07-18 NOTE — TELEPHONE ENCOUNTER
PT ASKED IF [place] [facility] CAN [facility] [place] P[place]

## 2018-10-11 ENCOUNTER — TELEPHONE (OUTPATIENT)
Dept: FAMILY MEDICINE CLINIC | Facility: CLINIC | Age: 68
End: 2018-10-11

## 2018-10-11 ENCOUNTER — LAB (OUTPATIENT)
Dept: FAMILY MEDICINE CLINIC | Facility: CLINIC | Age: 68
End: 2018-10-11

## 2018-10-11 ENCOUNTER — CLINICAL SUPPORT (OUTPATIENT)
Dept: FAMILY MEDICINE CLINIC | Facility: CLINIC | Age: 68
End: 2018-10-11

## 2018-10-11 DIAGNOSIS — D72.819 LEUKOPENIA, UNSPECIFIED TYPE: Primary | ICD-10-CM

## 2018-10-11 DIAGNOSIS — D72.819 LEUKOPENIA, UNSPECIFIED TYPE: ICD-10-CM

## 2018-10-11 LAB
CHOLEST SERPL-MCNC: 233 MG/DL (ref 0–200)
ERYTHROCYTE [DISTWIDTH] IN BLOOD BY AUTOMATED COUNT: 15.3 % (ref 4.5–15)
HCT VFR BLD AUTO: 38.7 % (ref 31–42)
HDLC SERPL-MCNC: 56 MG/DL (ref 40–60)
HGB BLD-MCNC: 12.4 G/DL (ref 12–18)
LDLC SERPL CALC-MCNC: 147 MG/DL (ref 0–100)
LDLC/HDLC SERPL: 2.63 {RATIO}
LYMPHOCYTES # BLD AUTO: 1.1 10*3/MM3 (ref 1.2–3.4)
LYMPHOCYTES NFR BLD AUTO: 26.5 % (ref 21–51)
MCH RBC QN AUTO: 27.3 PG (ref 26.1–33.1)
MCHC RBC AUTO-ENTMCNC: 32.2 G/DL (ref 33–37)
MCV RBC AUTO: 84.9 FL (ref 80–99)
MONOCYTES # BLD AUTO: 0.2 10*3/MM3 (ref 0.1–0.6)
MONOCYTES NFR BLD AUTO: 4.7 % (ref 2–9)
NEUTROPHILS # BLD AUTO: 2.8 10*3/MM3 (ref 1.4–6.5)
NEUTROPHILS NFR BLD AUTO: 68.8 % (ref 42–75)
PLATELET # BLD AUTO: 287 10*3/MM3 (ref 150–450)
PMV BLD AUTO: 7.1 FL (ref 7.1–10.5)
RBC # BLD AUTO: 4.55 10*6/MM3 (ref 4–6)
TRIGL SERPL-MCNC: 150 MG/DL (ref 0–150)
VLDLC SERPL-MCNC: 30 MG/DL (ref 5–40)
WBC NRBC COR # BLD: 4 10*3/MM3 (ref 4.5–10)

## 2018-10-11 PROCEDURE — 85025 COMPLETE CBC W/AUTO DIFF WBC: CPT | Performed by: NURSE PRACTITIONER

## 2018-10-11 PROCEDURE — 36415 COLL VENOUS BLD VENIPUNCTURE: CPT | Performed by: NURSE PRACTITIONER

## 2018-10-11 PROCEDURE — G0008 ADMIN INFLUENZA VIRUS VAC: HCPCS | Performed by: INTERNAL MEDICINE

## 2018-10-11 PROCEDURE — 80061 LIPID PANEL: CPT | Performed by: NURSE PRACTITIONER

## 2018-10-11 PROCEDURE — 90662 IIV NO PRSV INCREASED AG IM: CPT | Performed by: INTERNAL MEDICINE

## 2018-10-11 RX ORDER — ATORVASTATIN CALCIUM 10 MG/1
10 TABLET, FILM COATED ORAL NIGHTLY
Qty: 30 TABLET | Refills: 5 | Status: SHIPPED | OUTPATIENT
Start: 2018-10-11 | End: 2019-04-01 | Stop reason: SDUPTHER

## 2018-10-11 NOTE — TELEPHONE ENCOUNTER
Pt informed. Doesn't have any welchol. She cant afford can she go back on lipitor. Please send to [facility]

## 2018-10-11 NOTE — TELEPHONE ENCOUNTER
erx lipitor 10 mg to [facility]    WBC improving now 4, we can monitor and recheck in 1 mo lab only. Chol elevated, are you taking welchol regularly? Enc low chol diet and exericse

## 2018-12-14 ENCOUNTER — TELEPHONE (OUTPATIENT)
Dept: FAMILY MEDICINE CLINIC | Facility: CLINIC | Age: 68
End: 2018-12-14

## 2018-12-14 NOTE — TELEPHONE ENCOUNTER
IS CALLING GORAN ABOUT A HANDICAPPED STICKER, AND NEEDS IT SIGNED SHE WANTS A CALL ABOUT IF SHE CAN BRING IT AND DROP OFF FOR CULLEN TO SIGN, SHE FORGOT IT THE OTHER DAY WHEN SHE WAS HERE.

## 2018-12-19 ENCOUNTER — OFFICE VISIT (OUTPATIENT)
Dept: FAMILY MEDICINE CLINIC | Facility: CLINIC | Age: 68
End: 2018-12-19

## 2018-12-19 VITALS
TEMPERATURE: 98.6 F | HEART RATE: 95 BPM | OXYGEN SATURATION: 96 % | SYSTOLIC BLOOD PRESSURE: 124 MMHG | WEIGHT: 210 LBS | DIASTOLIC BLOOD PRESSURE: 64 MMHG | HEIGHT: 67 IN | BODY MASS INDEX: 32.96 KG/M2

## 2018-12-19 DIAGNOSIS — I10 ESSENTIAL HYPERTENSION: ICD-10-CM

## 2018-12-19 DIAGNOSIS — J01.00 ACUTE MAXILLARY SINUSITIS, RECURRENCE NOT SPECIFIED: Primary | ICD-10-CM

## 2018-12-19 DIAGNOSIS — J30.2 SEASONAL ALLERGIC RHINITIS, UNSPECIFIED TRIGGER: ICD-10-CM

## 2018-12-19 PROCEDURE — 99213 OFFICE O/P EST LOW 20 MIN: CPT | Performed by: NURSE PRACTITIONER

## 2018-12-19 RX ORDER — FEXOFENADINE HCL 180 MG/1
180 TABLET ORAL DAILY
Qty: 30 TABLET | Refills: 5 | Status: SHIPPED | OUTPATIENT
Start: 2018-12-19 | End: 2022-06-07

## 2018-12-19 RX ORDER — FLUTICASONE PROPIONATE 50 MCG
2 SPRAY, SUSPENSION (ML) NASAL DAILY
Qty: 1 BOTTLE | Refills: 11 | Status: SHIPPED | OUTPATIENT
Start: 2018-12-19 | End: 2022-06-07

## 2018-12-19 RX ORDER — AMLODIPINE BESYLATE 10 MG/1
10 TABLET ORAL DAILY
Qty: 90 TABLET | Refills: 1 | Status: SHIPPED | OUTPATIENT
Start: 2018-12-19 | End: 2019-06-25 | Stop reason: SDUPTHER

## 2018-12-19 RX ORDER — AMOXICILLIN 875 MG/1
875 TABLET, COATED ORAL 2 TIMES DAILY
Qty: 20 TABLET | Refills: 0 | Status: SHIPPED | OUTPATIENT
Start: 2018-12-19 | End: 2019-10-09 | Stop reason: SDUPTHER

## 2018-12-19 NOTE — PROGRESS NOTES
Subjective   Danielle Torres is a 68 y.o. female.     History of Present Illness   C/o HA and sinus congestion tenderness x 3 days, no ear pain or sore throat, no NV fevers, diarrhea or body aches, with slight wheezing no cough or SOA, OTC mucinex helping, Allergic latex, last tx sinusitis 05/18 worse with season change amox 875 mg, flonase and allegra with chronic allergies  With chronic HTN on amlodipine 10 mg daily no CP dizziness HA LE edema, on ASA and with chronic chol on lipitor and welchol last checked 10/18  nonfasting today, with hx of low WBC 10/18 4 but doesn't want to recheck today as sick    The following portions of the patient's history were reviewed and updated as appropriate: allergies, current medications, past family history, past medical history, past social history, past surgical history and problem list.    Review of Systems   Constitutional: Negative for fever.   HENT: Positive for congestion, facial swelling, postnasal drip, sinus pressure and sinus pain. Negative for dental problem, drooling, ear discharge, ear pain (but itching), hearing loss, mouth sores, nosebleeds, sneezing, sore throat, tinnitus, trouble swallowing and voice change.    Respiratory: Positive for wheezing. Negative for apnea, cough, choking, chest tightness, shortness of breath and stridor.    Cardiovascular: Negative for chest pain, palpitations and leg swelling.   Gastrointestinal: Negative for abdominal distention, abdominal pain, anal bleeding, blood in stool, constipation, diarrhea, nausea, rectal pain and vomiting.   Neurological: Positive for headaches. Negative for dizziness, tremors, seizures, syncope, facial asymmetry, speech difficulty, weakness, light-headedness and numbness.   All other systems reviewed and are negative.      Objective   Physical Exam   Constitutional: She is oriented to person, place, and time. She appears well-developed and well-nourished.   HENT:   Head: Normocephalic and atraumatic.    Right Ear: Hearing normal. A middle ear effusion is present.   Left Ear: Hearing normal. A middle ear effusion is present.   Nose: Mucosal edema present. Right sinus exhibits maxillary sinus tenderness and frontal sinus tenderness. Left sinus exhibits maxillary sinus tenderness and frontal sinus tenderness.   Mouth/Throat: No oropharyngeal exudate, posterior oropharyngeal edema or posterior oropharyngeal erythema.   Eyes: Conjunctivae and EOM are normal. Pupils are equal, round, and reactive to light.   Neck: Normal range of motion. Neck supple. No thyromegaly present.   Cardiovascular: Normal rate, regular rhythm and normal heart sounds.   Pulmonary/Chest: Effort normal and breath sounds normal.   Musculoskeletal: Normal range of motion.   Lymphadenopathy:     She has cervical adenopathy.   Neurological: She is alert and oriented to person, place, and time.   Skin: Skin is warm and dry.   Psychiatric: She has a normal mood and affect. Her behavior is normal. Judgment and thought content normal.   Vitals reviewed.      Assessment/Plan   Danielle was seen today for uri.    Diagnoses and all orders for this visit:    Acute maxillary sinusitis, recurrence not specified    Seasonal allergic rhinitis, unspecified trigger    Essential hypertension    Other orders  -     amoxicillin (AMOXIL) 875 MG tablet; Take 1 tablet by mouth 2 (Two) Times a Day.  -     fexofenadine (ALLEGRA ALLERGY) 180 MG tablet; Take 1 tablet by mouth Daily.  -     amLODIPine (NORVASC) 10 MG tablet; Take 1 tablet by mouth Daily.  -     fluticasone (FLONASE) 50 MCG/ACT nasal spray; 2 sprays into the nostril(s) as directed by provider Daily.    erx amox 875 mg BID x 10 days, erx allegra 180 mg daily and flonase NS, refill amlodipine and check BP at home will recheck labs 3-6 mo

## 2018-12-19 NOTE — PATIENT INSTRUCTIONS
erx amox 875 mg BID x 10 days, erx allegra 180 mg daily and flonase NS, refill amlodipine and check BP at home will recheck labs 3-6 mo

## 2019-01-09 RX ORDER — ALLOPURINOL 100 MG/1
100 TABLET ORAL DAILY
Qty: 90 TABLET | Refills: 3 | Status: SHIPPED | OUTPATIENT
Start: 2019-01-09 | End: 2020-03-16

## 2019-04-01 RX ORDER — ATORVASTATIN CALCIUM 10 MG/1
10 TABLET, FILM COATED ORAL NIGHTLY
Qty: 90 TABLET | Refills: 4 | Status: SHIPPED | OUTPATIENT
Start: 2019-04-01 | End: 2020-04-30

## 2019-06-25 RX ORDER — AMLODIPINE BESYLATE 10 MG/1
TABLET ORAL
Qty: 90 TABLET | Refills: 0 | Status: SHIPPED | OUTPATIENT
Start: 2019-06-25 | End: 2019-10-09 | Stop reason: SDUPTHER

## 2019-10-09 ENCOUNTER — OFFICE VISIT (OUTPATIENT)
Dept: FAMILY MEDICINE CLINIC | Facility: CLINIC | Age: 69
End: 2019-10-09

## 2019-10-09 VITALS
HEART RATE: 76 BPM | DIASTOLIC BLOOD PRESSURE: 74 MMHG | OXYGEN SATURATION: 99 % | HEIGHT: 64 IN | TEMPERATURE: 99.1 F | WEIGHT: 199 LBS | SYSTOLIC BLOOD PRESSURE: 126 MMHG | BODY MASS INDEX: 33.97 KG/M2

## 2019-10-09 DIAGNOSIS — Z00.00 MEDICARE ANNUAL WELLNESS VISIT, SUBSEQUENT: Primary | ICD-10-CM

## 2019-10-09 DIAGNOSIS — M1A.00X0 IDIOPATHIC CHRONIC GOUT WITHOUT TOPHUS, UNSPECIFIED SITE: ICD-10-CM

## 2019-10-09 DIAGNOSIS — Z12.31 ENCOUNTER FOR SCREENING MAMMOGRAM FOR BREAST CANCER: ICD-10-CM

## 2019-10-09 DIAGNOSIS — J01.00 ACUTE MAXILLARY SINUSITIS, RECURRENCE NOT SPECIFIED: ICD-10-CM

## 2019-10-09 DIAGNOSIS — E78.5 HYPERLIPIDEMIA, UNSPECIFIED HYPERLIPIDEMIA TYPE: ICD-10-CM

## 2019-10-09 DIAGNOSIS — E66.09 CLASS 1 OBESITY DUE TO EXCESS CALORIES WITH SERIOUS COMORBIDITY AND BODY MASS INDEX (BMI) OF 32.0 TO 32.9 IN ADULT: ICD-10-CM

## 2019-10-09 DIAGNOSIS — J30.2 SEASONAL ALLERGIC RHINITIS, UNSPECIFIED TRIGGER: ICD-10-CM

## 2019-10-09 DIAGNOSIS — I10 ESSENTIAL HYPERTENSION: ICD-10-CM

## 2019-10-09 LAB
ALBUMIN SERPL-MCNC: 4.5 G/DL (ref 3.5–5.2)
ALBUMIN/GLOB SERPL: 1.5 G/DL
ALP SERPL-CCNC: 69 U/L (ref 39–117)
ALT SERPL W P-5'-P-CCNC: 15 U/L (ref 1–33)
ANION GAP SERPL CALCULATED.3IONS-SCNC: 11.1 MMOL/L (ref 5–15)
AST SERPL-CCNC: 15 U/L (ref 1–32)
BACTERIA UR QL AUTO: NORMAL /HPF
BILIRUB SERPL-MCNC: 0.4 MG/DL (ref 0.2–1.2)
BILIRUB UR QL STRIP: NEGATIVE
BUN BLD-MCNC: 12 MG/DL (ref 8–23)
BUN/CREAT SERPL: 15.4 (ref 7–25)
CALCIUM SPEC-SCNC: 9.2 MG/DL (ref 8.6–10.5)
CHLORIDE SERPL-SCNC: 100 MMOL/L (ref 98–107)
CHOLEST SERPL-MCNC: 174 MG/DL (ref 0–200)
CLARITY UR: CLEAR
CO2 SERPL-SCNC: 27.9 MMOL/L (ref 22–29)
COLOR UR: YELLOW
CREAT BLD-MCNC: 0.78 MG/DL (ref 0.57–1)
ERYTHROCYTE [DISTWIDTH] IN BLOOD BY AUTOMATED COUNT: 15.8 % (ref 12.3–15.4)
GFR SERPL CREATININE-BSD FRML MDRD: 89 ML/MIN/1.73
GLOBULIN UR ELPH-MCNC: 3 GM/DL
GLUCOSE BLD-MCNC: 107 MG/DL (ref 65–99)
GLUCOSE UR STRIP-MCNC: NEGATIVE MG/DL
HCT VFR BLD AUTO: 38.1 % (ref 34–46.6)
HDLC SERPL-MCNC: 53 MG/DL (ref 40–60)
HGB BLD-MCNC: 12.1 G/DL (ref 12–15.9)
HGB UR QL STRIP.AUTO: ABNORMAL
KETONES UR QL STRIP: NEGATIVE
LDLC SERPL CALC-MCNC: 103 MG/DL (ref 0–100)
LDLC/HDLC SERPL: 1.95 {RATIO}
LEUKOCYTE ESTERASE UR QL STRIP.AUTO: NEGATIVE
LYMPHOCYTES # BLD AUTO: 1.2 10*3/MM3 (ref 0.7–3.1)
LYMPHOCYTES NFR BLD AUTO: 32.4 % (ref 19.6–45.3)
MCH RBC QN AUTO: 26.1 PG (ref 26.6–33)
MCHC RBC AUTO-ENTMCNC: 31.9 G/DL (ref 31.5–35.7)
MCV RBC AUTO: 82.1 FL (ref 79–97)
MONOCYTES # BLD AUTO: 0.1 10*3/MM3 (ref 0.1–0.9)
MONOCYTES NFR BLD AUTO: 3.1 % (ref 5–12)
NEUTROPHILS # BLD AUTO: 2.5 10*3/MM3 (ref 1.7–7)
NEUTROPHILS NFR BLD AUTO: 64.5 % (ref 42.7–76)
NITRITE UR QL STRIP: NEGATIVE
PH UR STRIP.AUTO: 5.5 [PH] (ref 4.6–8)
PLATELET # BLD AUTO: 361 10*3/MM3 (ref 140–450)
PMV BLD AUTO: 5.4 FL (ref 6–12)
POTASSIUM BLD-SCNC: 4.3 MMOL/L (ref 3.5–5.2)
PROT SERPL-MCNC: 7.5 G/DL (ref 6–8.5)
PROT UR QL STRIP: NEGATIVE
RBC # BLD AUTO: 4.64 10*6/MM3 (ref 3.77–5.28)
RBC # UR: NORMAL /HPF
REF LAB TEST METHOD: NORMAL
SODIUM BLD-SCNC: 139 MMOL/L (ref 136–145)
SP GR UR STRIP: <=1.005 (ref 1–1.03)
SQUAMOUS #/AREA URNS HPF: NORMAL /HPF
TRIGL SERPL-MCNC: 89 MG/DL (ref 0–150)
TSH SERPL DL<=0.05 MIU/L-ACNC: 0.64 UIU/ML (ref 0.27–4.2)
UROBILINOGEN UR QL STRIP: ABNORMAL
VLDLC SERPL-MCNC: 17.8 MG/DL (ref 5–40)
WBC NRBC COR # BLD: 3.8 10*3/MM3 (ref 3.4–10.8)
WBC UR QL AUTO: NORMAL /HPF

## 2019-10-09 PROCEDURE — 36415 COLL VENOUS BLD VENIPUNCTURE: CPT | Performed by: NURSE PRACTITIONER

## 2019-10-09 PROCEDURE — 80053 COMPREHEN METABOLIC PANEL: CPT | Performed by: NURSE PRACTITIONER

## 2019-10-09 PROCEDURE — 81001 URINALYSIS AUTO W/SCOPE: CPT | Performed by: NURSE PRACTITIONER

## 2019-10-09 PROCEDURE — 85025 COMPLETE CBC W/AUTO DIFF WBC: CPT | Performed by: NURSE PRACTITIONER

## 2019-10-09 PROCEDURE — 80061 LIPID PANEL: CPT | Performed by: NURSE PRACTITIONER

## 2019-10-09 PROCEDURE — G0439 PPPS, SUBSEQ VISIT: HCPCS | Performed by: NURSE PRACTITIONER

## 2019-10-09 PROCEDURE — 99214 OFFICE O/P EST MOD 30 MIN: CPT | Performed by: NURSE PRACTITIONER

## 2019-10-09 PROCEDURE — 84443 ASSAY THYROID STIM HORMONE: CPT | Performed by: NURSE PRACTITIONER

## 2019-10-09 RX ORDER — AMOXICILLIN 875 MG/1
875 TABLET, COATED ORAL 2 TIMES DAILY
Qty: 20 TABLET | Refills: 0 | Status: SHIPPED | OUTPATIENT
Start: 2019-10-09 | End: 2020-02-04 | Stop reason: SDUPTHER

## 2019-10-09 RX ORDER — AMLODIPINE BESYLATE 10 MG/1
10 TABLET ORAL DAILY
Qty: 90 TABLET | Refills: 3 | Status: SHIPPED | OUTPATIENT
Start: 2019-10-09 | End: 2020-07-27 | Stop reason: SDUPTHER

## 2019-10-09 NOTE — PATIENT INSTRUCTIONS
medicare wellness today, check labs and call with results, refill amlodipine and check BP at home, cont all chronic dz meds, erx amox 875 mg BID, increase H20 and rest, call or RTC if sx persist or worsen, restart allegra and flonase, Enc healthy diet and regular exercise for wt loss, order overdue mammo defer CBE today  Medicare Wellness  Personal Prevention Plan of Service     Date of Office Visit:  10/09/2019  Encounter Provider:  JACKLYN Alvarado  Place of Service:  [facility]  Patient Name: Danielle Torres  :  1950    As part of the Medicare Wellness portion of your visit today, we are providing you with this personalized preventive plan of services (PPPS). This plan is based upon recommendations of the [place] Preventive Services Task Force (USPSTF) and the Advisory Committee on Immunization Practices (ACIP).    This lists the preventive care services that should be considered, and provides dates of when you are due. Items listed as completed are up-to-date and do not require any further intervention.    Health Maintenance   Topic Date Due   • ZOSTER VACCINE (2 of 2) 2017   • PNEUMOCOCCAL VACCINES (65+ LOW/MEDIUM RISK) (2 of 2 - PPSV23) 2017   • DIABETIC FOOT EXAM  02/10/2018   • URINE MICROALBUMIN  02/10/2018   • MEDICARE ANNUAL WELLNESS  2018   • DIABETIC EYE EXAM  2018   • HEMOGLOBIN A1C  2018   • INFLUENZA VACCINE  2019   • LIPID PANEL  10/11/2019   • MAMMOGRAM  2020   • COLONOSCOPY  2026   • TDAP/TD VACCINES (3 - Td) 2027   • HEPATITIS C SCREENING  Completed       Orders Placed This Encounter   Procedures   • Mammo Screening Bilateral With CAD     Standing Status:   Future     Standing Expiration Date:   10/9/2020     Order Specific Question:   Reason for Exam:     Answer:   br ca screen   • Comprehensive Metabolic Panel   • Lipid Panel   • TSH   • CBC Auto Differential   • Urinalysis  without microscopic (no culture) - Urine, Clean Catch   • Urinalysis, Microscopic Only - Urine, Clean Catch   • Uric Acid   • CBC & Differential     Order Specific Question:   Manual Differential     Answer:   No   • Urinalysis With Microscopic - Urine, Clean Catch       Return in about 6 months (around 4/9/2020).        Advance Directive  Advance directives are legal documents that let you make choices ahead of time about your health care and medical treatment in case you become unable to communicate for yourself. Advance directives are a way for you to communicate your wishes to family, friends, and health care providers. This can help convey your decisions about end-of-life care if you become unable to communicate.  Discussing and writing advance directives should happen over time rather than all at once. Advance directives can be changed depending on your situation and what you want, even after you have signed the advance directives.  If you do not have an advance directive, some states assign family decision makers to act on your behalf based on how closely you are related to them. Each state has its own laws regarding advance directives. You may want to check with your health care provider, , or state representative about the laws in your state. There are different types of advance directives, such as:  · Medical power of .  · Living will.  · Do not resuscitate (DNR) or do not attempt resuscitation (DNAR) order.  Health care proxy and medical power of   A health care proxy, also called a health care agent, is a person who is appointed to make medical decisions for you in cases in which you are unable to make the decisions yourself. Generally, people choose someone they know well and trust to represent their preferences. Make sure to ask this person for an agreement to act as your proxy. A proxy may have to exercise judgment in the event of a medical decision for which your wishes are  not known.  A medical power of  is a legal document that names your health care proxy. Depending on the laws in your state, after the document is written, it may also need to be:  · Signed.  · Notarized.  · Dated.  · Copied.  · Witnessed.  · Incorporated into your medical record.  You may also want to appoint someone to manage your financial affairs in a situation in which you are unable to do so. This is called a durable power of  for finances. It is a separate legal document from the durable power of  for health care. You may choose the same person or someone different from your health care proxy to act as your agent in financial matters.  If you do not appoint a proxy, or if there is a concern that the proxy is not acting in your best interests, a court-appointed guardian may be designated to act on your behalf.  Living will  A living will is a set of instructions documenting your wishes about medical care when you cannot express them yourself. Health care providers should keep a copy of your living will in your medical record. You may want to give a copy to family members or friends. To alert caregivers in case of an emergency, you can place a card in your wallet to let them know that you have a living will and where they can find it. A living will is used if you become:  · Terminally ill.  · Incapacitated.  · Unable to communicate or make decisions.  Items to consider in your living will include:  · The use or non-use of life-sustaining equipment, such as dialysis machines and breathing machines (ventilators).  · A DNR or DNAR order, which is the instruction not to use cardiopulmonary resuscitation (CPR) if breathing or heartbeat stops.  · The use or non-use of tube feeding.  · Withholding of food and fluids.  · Comfort (palliative) care when the goal becomes comfort rather than a cure.  · Organ and tissue donation.  A living will does not give instructions for distributing your money  and property if you should pass away. It is recommended that you seek the advice of a  when writing a will. Decisions about taxes, beneficiaries, and asset distribution will be legally binding. This process can relieve your family and friends of any concerns surrounding disputes or questions that may come up about the distribution of your assets.  DNR or DNAR  A DNR or DNAR order is a request not to have CPR in the event that your heart stops beating or you stop breathing. If a DNR or DNAR order has not been made and shared, a health care provider will try to help any patient whose heart has stopped or who has stopped breathing. If you plan to have surgery, talk with your health care provider about how your DNR or DNAR order will be followed if problems occur.  Summary  · Advance directives are the legal documents that allow you to make choices ahead of time about your health care and medical treatment in case you become unable to communicate for yourself.  · The process of discussing and writing advance directives should happen over time. You can change the advance directives, even after you have signed them.  · Advance directives include DNR or DNAR orders, living grande, and designating an agent as your medical power of .  This information is not intended to replace advice given to you by your health care provider. Make sure you discuss any questions you have with your health care provider.  Document Released: 03/26/2009 Document Revised: 11/06/2017 Document Reviewed: 11/06/2017  [employer] Interactive Patient Education © 2019 [employer]

## 2019-10-09 NOTE — PROGRESS NOTES
Subjective   Danielle Torres is a 69 y.o. female.     History of Present Illness   C/o sinus congestion and tenderness with cough and PND > 1 wk, recently traveled to [place] and thinks worsening allergies when returned to town, restarted allegra and has flonase at home, no SOA or wheezing, with ear congestion and sore throat, with fever, no OTC, Last tx sinusitis 12/18 amox 875 mg BID, with chronic allergies on allegra, states prev had heartburn and abd pain after eating on vacation took nexium and resolved  With chronic well controlled HTN on amlodipine 10 mg daily no CP dizziness HA LE edema, with chronic chol on lipitor 10 mg fasting today uncontrolled last chol 10/18  With chronic gout on allopurinol 100 mg daily and OTC cherry no recent flairs  Last mammo 05/18 no breast pain, lumps, nipple dc  Last colonoscopy 12/16 Dr Aly    The following portions of the patient's history were reviewed and updated as appropriate: allergies, current medications, past family history, past medical history, past social history, past surgical history and problem list.    Review of Systems   Constitutional: Negative for fever.   HENT: Positive for congestion, ear pain, facial swelling, postnasal drip, sinus pressure, sinus pain, sore throat and voice change. Negative for dental problem, drooling, ear discharge, hearing loss, mouth sores, nosebleeds, rhinorrhea, sneezing, tinnitus and trouble swallowing.    Respiratory: Positive for cough. Negative for shortness of breath, wheezing and stridor.    Cardiovascular: Negative for chest pain, palpitations and leg swelling.   Gastrointestinal: Positive for abdominal pain (resolved earlier this week). Negative for abdominal distention, anal bleeding, blood in stool, constipation, diarrhea, nausea, rectal pain and vomiting.        Heartburn   Allergic/Immunologic: Positive for environmental allergies.   Neurological: Negative for dizziness and headaches.   Psychiatric/Behavioral:  Negative for agitation, behavioral problems, confusion, decreased concentration, dysphoric mood, hallucinations, self-injury, sleep disturbance and suicidal ideas. The patient is not nervous/anxious and is not hyperactive.    All other systems reviewed and are negative.      Objective   Physical Exam   Constitutional: She is oriented to person, place, and time. She appears well-developed and well-nourished.   HENT:   Head: Normocephalic and atraumatic.   Right Ear: Hearing normal. A middle ear effusion is present.   Left Ear: Hearing normal. A middle ear effusion is present.   Nose: Mucosal edema present. Right sinus exhibits maxillary sinus tenderness and frontal sinus tenderness. Left sinus exhibits maxillary sinus tenderness and frontal sinus tenderness.   Mouth/Throat: Posterior oropharyngeal edema and posterior oropharyngeal erythema present.   Eyes: Conjunctivae and EOM are normal. Pupils are equal, round, and reactive to light.   Neck: Normal range of motion. Neck supple. No thyromegaly present.   Cardiovascular: Normal rate, regular rhythm and normal heart sounds.   Pulmonary/Chest: Effort normal and breath sounds normal.   Musculoskeletal: Normal range of motion.   Lymphadenopathy:     She has cervical adenopathy.   Neurological: She is alert and oriented to person, place, and time.   Skin: Skin is warm and dry.   Psychiatric: She has a normal mood and affect. Her behavior is normal. Judgment and thought content normal.   Vitals reviewed.      Assessment/Plan   Danielle was seen today for sinusitis.    Diagnoses and all orders for this visit:    Medicare annual wellness visit, subsequent    Essential hypertension  -     CBC & Differential  -     Comprehensive Metabolic Panel  -     Lipid Panel  -     TSH  -     Urinalysis With Microscopic - Urine, Clean Catch  -     CBC Auto Differential  -     Urinalysis without microscopic (no culture) - Urine, Clean Catch  -     Urinalysis, Microscopic Only - Urine, Clean  Catch    Hyperlipidemia, unspecified hyperlipidemia type  -     CBC & Differential  -     Comprehensive Metabolic Panel  -     Lipid Panel  -     CBC Auto Differential    Class 1 obesity due to excess calories with serious comorbidity and body mass index (BMI) of 32.0 to 32.9 in adult    Acute maxillary sinusitis, recurrence not specified    Seasonal allergic rhinitis, unspecified trigger    Encounter for screening mammogram for breast cancer  -     Mammo Screening Bilateral With CAD; Future    Idiopathic chronic gout without tophus, unspecified site  -     Uric Acid    Other orders  -     amoxicillin (AMOXIL) 875 MG tablet; Take 1 tablet by mouth 2 (Two) Times a Day.  -     amLODIPine (NORVASC) 10 MG tablet; Take 1 tablet by mouth Daily. 200001    medicare wellness today, check labs and call with results, refill amlodipine and check BP at home, cont all chronic dz meds, erx amox 875 mg BID, increase H20 and rest, call or RTC if sx persist or worsen, restart allegra and flonase, Enc healthy diet and regular exercise for wt loss, order overdue mammo defer CBE today

## 2019-10-09 NOTE — PROGRESS NOTES
The ABCs of the Annual Wellness Visit  Subsequent Medicare Wellness Visit    Chief Complaint   Patient presents with   • Sinusitis     sinus infection/allergies     Subjective   History of Present Illness:  Danielle Torres is a 69 y.o. female who presents for a Subsequent Medicare Wellness Visit.    HEALTH RISK ASSESSMENT    Recent Hospitalizations:  No hospitalization(s) within the last year.    Current Medical Providers:  Patient Care Team:  Rahat Arciniega MD as PCP - General  Amelie Diaz APRN as PCP - Claims Attributed  Amelie Diaz APRN as Nurse Practitioner (Family Medicine)    Smoking Status:  Social History     Tobacco Use   Smoking Status Former Smoker   • Types: Cigarettes   • Last attempt to quit: 2000   • Years since quittin.4   Smokeless Tobacco Never Used     Alcohol Consumption:  Social History     Substance and Sexual Activity   Alcohol Use No     Depression Screen:   PHQ-2/PHQ-9 Depression Screening 10/9/2019   Little interest or pleasure in doing things 0   Feeling down, depressed, or hopeless 0   Trouble falling or staying asleep, or sleeping too much 0   Feeling tired or having little energy 0   Poor appetite or overeating 0   Feeling bad about yourself - or that you are a failure or have let yourself or your family down 0   Trouble concentrating on things, such as reading the newspaper or watching television 0   Moving or speaking so slowly that other people could have noticed. Or the opposite - being so fidgety or restless that you have been moving around a lot more than usual 0   Thoughts that you would be better off dead, or of hurting yourself in some way 0   Total Score 0   If you checked off any problems, how difficult have these problems made it for you to do your work, take care of things at home, or get along with other people? Not difficult at all     Fall Risk Screen:  STEADI Fall Risk Assessment was completed, and patient is at LOW risk for  falls.Assessment completed on:10/9/2019    Health Habits and Functional and Cognitive Screening:  Functional & Cognitive Status 10/9/2019   Do you have difficulty preparing food and eating? No   Do you have difficulty bathing yourself, getting dressed or grooming yourself? No   Do you have difficulty using the toilet? No   Do you have difficulty moving around from place to place? No   Do you have trouble with steps or getting out of a bed or a chair? No   Current Diet Well Balanced Diet   Dental Exam Up to date   Eye Exam Up to date   Exercise (times per week) 3 times per week   Current Exercise Activities Include Walking   Do you need help using the phone?  No   Are you deaf or do you have serious difficulty hearing?  No   Do you need help with transportation? No   Do you need help shopping? No   Do you need help preparing meals?  No   Do you need help with housework?  No   Do you need help with laundry? No   Do you need help taking your medications? No   Do you need help managing money? No   Do you ever drive or ride in a car without wearing a seat belt? No   Have you felt unusual stress, anger or loneliness in the last month? No   Who do you live with? Spouse   If you need help, do you have trouble finding someone available to you? No   Have you been bothered in the last four weeks by sexual problems? No   Do you have difficulty concentrating, remembering or making decisions? No     Does the patient have evidence of cognitive impairment? No    Asprin use counseling:Taking ASA appropriately as indicated    Age-appropriate Screening Schedule:  Refer to the list below for future screening recommendations based on patient's age, sex and/or medical conditions. Orders for these recommended tests are listed in the plan section. The patient has been provided with a written plan.    Health Maintenance   Topic Date Due   • ZOSTER VACCINE (2 of 2) 04/07/2017   • PNEUMOCOCCAL VACCINES (65+ LOW/MEDIUM RISK) (2 of 2 - PPSV23)  11/01/2017   • DIABETIC FOOT EXAM  02/10/2018   • URINE MICROALBUMIN  02/10/2018   • DIABETIC EYE EXAM  11/30/2018   • HEMOGLOBIN A1C  12/04/2018   • INFLUENZA VACCINE  08/01/2019   • LIPID PANEL  10/11/2019   • MAMMOGRAM  05/31/2020   • COLONOSCOPY  12/27/2026   • TDAP/TD VACCINES (3 - Td) 08/11/2027          The following portions of the patient's history were reviewed and updated as appropriate: allergies, current medications, past family history, past medical history, past social history, past surgical history and problem list.    Outpatient Medications Prior to Visit   Medication Sig Dispense Refill   • allopurinol (ZYLOPRIM) 100 MG tablet Take 1 tablet by mouth Daily. 90 tablet 3   • aspirin 81 MG EC tablet Take 81 mg by mouth Daily.     • atorvastatin (LIPITOR) 10 MG tablet TAKE 1 TABLET BY MOUTH EVERY NIGHT 90 tablet 4   • fexofenadine (ALLEGRA ALLERGY) 180 MG tablet Take 1 tablet by mouth Daily. 30 tablet 5   • amLODIPine (NORVASC) 10 MG tablet TAKE 1 TABLET BY MOUTH ONE TIME A DAY  90 tablet 0   • fluticasone (FLONASE) 50 MCG/ACT nasal spray 2 sprays into the nostril(s) as directed by provider Daily. 1 bottle 11   • amoxicillin (AMOXIL) 875 MG tablet Take 1 tablet by mouth 2 (Two) Times a Day. 20 tablet 0   • colesevelam (WELCHOL) 625 MG tablet Take 3 tablets by mouth 2 (Two) Times a Day With Meals. 180 tablet 5     No facility-administered medications prior to visit.        Patient Active Problem List   Diagnosis   • Hypertension   • Hyperlipidemia   • Gastroesophageal reflux disease without esophagitis   • Allergic rhinitis   • Idiopathic chronic gout   • Class 1 obesity due to excess calories with serious comorbidity and body mass index (BMI) of 32.0 to 32.9 in adult   • Prediabetes       Advanced Care Planning:  Patient does not have an advance directive - information provided to the patient today    Review of Systems    Compared to one year ago, the patient feels her physical health is better. Walking  "more  Compared to one year ago, the patient feels her mental health is the same.    Reviewed chart for potential of high risk medication in the elderly: yes  Reviewed chart for potential of harmful drug interactions in the elderly:yes    Objective         Vitals:    10/09/19 1405   BP: 126/74   BP Location: Left arm   Patient Position: Sitting   Cuff Size: Large Adult   Pulse: 76   Temp: 99.1 °F (37.3 °C)   TempSrc: Oral   SpO2: 99%   Weight: 90.3 kg (199 lb)   Height: 162.6 cm (64\")   PainSc: 0-No pain       Body mass index is 34.16 kg/m².  Discussed the patient's BMI with her. The BMI is above average; BMI management plan is completed.    Physical Exam          Assessment/Plan   Medicare Risks and Personalized Health Plan  CMS Preventative Services Quick Reference  Breast Cancer/Mammogram Screening  Cardiovascular risk  Obesity/Overweight   Last colonoscopy 12/16 Dr Aly colon polyps    The above risks/problems have been discussed with the patient.  Pertinent information has been shared with the patient in the After Visit Summary.  Follow up plans and orders are seen below in the Assessment/Plan Section.    Diagnoses and all orders for this visit:    1. Medicare annual wellness visit, subsequent (Primary)    2. Essential hypertension    3. Hyperlipidemia, unspecified hyperlipidemia type    4. Class 1 obesity due to excess calories with serious comorbidity and body mass index (BMI) of 32.0 to 32.9 in adult    5. Acute maxillary sinusitis, recurrence not specified    6. Seasonal allergic rhinitis, unspecified trigger    7. Encounter for screening mammogram for breast cancer  -     Mammo Screening Bilateral With CAD; Future    Other orders  -     amoxicillin (AMOXIL) 875 MG tablet; Take 1 tablet by mouth 2 (Two) Times a Day.  Dispense: 20 tablet; Refill: 0  -     amLODIPine (NORVASC) 10 MG tablet; Take 1 tablet by mouth Daily. 200001  Dispense: 90 tablet; Refill: 3      Follow Up:  Return in about 6 months (around " 4/9/2020).     An After Visit Summary and PPPS were given to the patient.

## 2019-10-11 ENCOUNTER — TELEPHONE (OUTPATIENT)
Dept: FAMILY MEDICINE CLINIC | Facility: CLINIC | Age: 69
End: 2019-10-11

## 2019-10-11 NOTE — TELEPHONE ENCOUNTER
BS sl elevated 107 enc healthy diet and exercise and we can monitor. Kidney, liver,thyroid normal. Urine normal. Chol improved since last check cont lipitor. They didn't check uric acid we can recheck NCV or you can return to clinic lab only.

## 2019-10-14 ENCOUNTER — TRANSCRIBE ORDERS (OUTPATIENT)
Dept: ADMINISTRATIVE | Facility: HOSPITAL | Age: 69
End: 2019-10-14

## 2019-10-14 DIAGNOSIS — Z12.31 VISIT FOR SCREENING MAMMOGRAM: Primary | ICD-10-CM

## 2019-10-16 ENCOUNTER — OFFICE VISIT (OUTPATIENT)
Dept: FAMILY MEDICINE CLINIC | Facility: CLINIC | Age: 69
End: 2019-10-16

## 2019-10-16 VITALS
OXYGEN SATURATION: 98 % | HEART RATE: 93 BPM | BODY MASS INDEX: 31.55 KG/M2 | WEIGHT: 201 LBS | DIASTOLIC BLOOD PRESSURE: 74 MMHG | SYSTOLIC BLOOD PRESSURE: 124 MMHG | HEIGHT: 67 IN | TEMPERATURE: 99 F

## 2019-10-16 DIAGNOSIS — J30.2 SEASONAL ALLERGIC RHINITIS, UNSPECIFIED TRIGGER: Primary | ICD-10-CM

## 2019-10-16 DIAGNOSIS — Z23 NEED FOR PNEUMOCOCCAL VACCINATION: ICD-10-CM

## 2019-10-16 DIAGNOSIS — E66.09 CLASS 1 OBESITY DUE TO EXCESS CALORIES WITH SERIOUS COMORBIDITY AND BODY MASS INDEX (BMI) OF 31.0 TO 31.9 IN ADULT: ICD-10-CM

## 2019-10-16 DIAGNOSIS — J01.00 ACUTE MAXILLARY SINUSITIS, RECURRENCE NOT SPECIFIED: ICD-10-CM

## 2019-10-16 DIAGNOSIS — Z23 NEED FOR INFLUENZA VACCINATION: ICD-10-CM

## 2019-10-16 PROBLEM — R73.03 PREDIABETES: Status: RESOLVED | Noted: 2018-01-05 | Resolved: 2019-10-16

## 2019-10-16 PROCEDURE — G0008 ADMIN INFLUENZA VIRUS VAC: HCPCS | Performed by: NURSE PRACTITIONER

## 2019-10-16 PROCEDURE — 99213 OFFICE O/P EST LOW 20 MIN: CPT | Performed by: NURSE PRACTITIONER

## 2019-10-16 PROCEDURE — 90732 PPSV23 VACC 2 YRS+ SUBQ/IM: CPT | Performed by: NURSE PRACTITIONER

## 2019-10-16 PROCEDURE — G0009 ADMIN PNEUMOCOCCAL VACCINE: HCPCS | Performed by: NURSE PRACTITIONER

## 2019-10-16 PROCEDURE — 90653 IIV ADJUVANT VACCINE IM: CPT | Performed by: NURSE PRACTITIONER

## 2019-10-16 NOTE — PROGRESS NOTES
Subjective   Danielle Torres is a 69 y.o. female.     History of Present Illness   Here to FU on recent sinus infection still taking amox 875 mg BID started last week and slowly improving, with congestion and sinus tenderness, no ear pain, sore throat, cough wheezing or SOA, no fevers and would like to get flu shot today, last prevnar 2016, with chronic allergies on allegra hasn't started flonase NS at home  With chronic well controlled HTN on amlodipine 10 mg daily no CP dizziness HA LE edema, with chronic well controlledchol on lipitor 10 mg last labs last week, With chronic gout on allopurinol 100 mg daily and OTC cherry no recent flairs, no recent uric acid but normal last year    The following portions of the patient's history were reviewed and updated as appropriate: allergies, current medications, past family history, past medical history, past social history, past surgical history and problem list.    Review of Systems   Constitutional: Negative for fever.   HENT: Positive for congestion, facial swelling, postnasal drip, rhinorrhea, sinus pressure and sinus pain. Negative for dental problem, drooling, ear discharge, ear pain, hearing loss, mouth sores, nosebleeds, sneezing, sore throat, tinnitus, trouble swallowing and voice change.    Respiratory: Negative for cough, shortness of breath and wheezing.    Cardiovascular: Negative for chest pain, palpitations and leg swelling.   Allergic/Immunologic: Positive for environmental allergies.   Neurological: Negative for dizziness and headaches.   All other systems reviewed and are negative.      Objective   Physical Exam   Constitutional: She is oriented to person, place, and time. She appears well-developed and well-nourished.   HENT:   Head: Normocephalic and atraumatic.   Right Ear: Hearing and tympanic membrane normal.   Left Ear: Hearing and tympanic membrane normal.   Nose: Mucosal edema present. Right sinus exhibits maxillary sinus tenderness (mild). Left  sinus exhibits maxillary sinus tenderness (mild).   Mouth/Throat: Oropharynx is clear and moist.   Eyes: Conjunctivae and EOM are normal. Pupils are equal, round, and reactive to light.   Neck: Normal range of motion. Neck supple. No thyromegaly present.   Cardiovascular: Normal rate, regular rhythm and normal heart sounds.   Pulmonary/Chest: Effort normal and breath sounds normal.   Musculoskeletal: Normal range of motion.   Lymphadenopathy:     She has no cervical adenopathy.   Neurological: She is alert and oriented to person, place, and time.   Skin: Skin is warm and dry.   Psychiatric: She has a normal mood and affect. Her behavior is normal. Judgment and thought content normal.   Vitals reviewed.      Assessment/Plan   Danielle was seen today for follow-up.    Diagnoses and all orders for this visit:    Seasonal allergic rhinitis, unspecified trigger    Need for influenza vaccination    Class 1 obesity due to excess calories with serious comorbidity and body mass index (BMI) of 31.0 to 31.9 in adult    Acute maxillary sinusitis, recurrence not specified    Need for pneumococcal vaccination    Other orders  -     Fluad Tri 65yr+  -     Pneumococcal Polysaccharide Vaccine 23-Valent Greater Than or Equal To 3yo Subcutaneous / IM    start flonase NS and cont allegra and amox improving on medicine, Enc healthy diet and regular exercise for wt loss, flu and pna shot today

## 2019-10-16 NOTE — PATIENT INSTRUCTIONS
start flonase NS and cont allegra and amox improving on medicine, Enc healthy diet and regular exercise for wt loss, flu and pna shot today

## 2019-11-14 ENCOUNTER — HOSPITAL ENCOUNTER (OUTPATIENT)
Dept: MAMMOGRAPHY | Facility: HOSPITAL | Age: 69
Discharge: HOME | End: 2019-11-14
Admitting: NURSE PRACTITIONER

## 2019-11-14 DIAGNOSIS — Z12.31 VISIT FOR SCREENING MAMMOGRAM: ICD-10-CM

## 2019-11-14 PROCEDURE — 77067 SCR MAMMO BI INCL CAD: CPT

## 2019-11-14 PROCEDURE — 77063 BREAST TOMOSYNTHESIS BI: CPT

## 2019-11-15 ENCOUNTER — TELEPHONE (OUTPATIENT)
Dept: FAMILY MEDICINE CLINIC | Facility: CLINIC | Age: 69
End: 2019-11-15

## 2019-11-18 ENCOUNTER — TELEPHONE (OUTPATIENT)
Dept: FAMILY MEDICINE CLINIC | Facility: CLINIC | Age: 69
End: 2019-11-18

## 2019-11-18 NOTE — TELEPHONE ENCOUNTER
Pt informed----- Message from JACKLYN Alvarado sent at 11/15/2019  1:06 PM EST -----  mammo negative, normal

## 2020-02-04 ENCOUNTER — OFFICE VISIT (OUTPATIENT)
Dept: FAMILY MEDICINE CLINIC | Facility: CLINIC | Age: 70
End: 2020-02-04

## 2020-02-04 VITALS
BODY MASS INDEX: 32.18 KG/M2 | RESPIRATION RATE: 16 BRPM | WEIGHT: 205 LBS | HEART RATE: 88 BPM | SYSTOLIC BLOOD PRESSURE: 136 MMHG | HEIGHT: 67 IN | TEMPERATURE: 98.9 F | DIASTOLIC BLOOD PRESSURE: 78 MMHG | OXYGEN SATURATION: 98 %

## 2020-02-04 DIAGNOSIS — M1A.00X0 IDIOPATHIC CHRONIC GOUT WITHOUT TOPHUS, UNSPECIFIED SITE: ICD-10-CM

## 2020-02-04 DIAGNOSIS — K21.9 GASTROESOPHAGEAL REFLUX DISEASE WITHOUT ESOPHAGITIS: ICD-10-CM

## 2020-02-04 DIAGNOSIS — J06.9 ACUTE URI: ICD-10-CM

## 2020-02-04 DIAGNOSIS — E78.00 PURE HYPERCHOLESTEROLEMIA: ICD-10-CM

## 2020-02-04 DIAGNOSIS — I10 ESSENTIAL HYPERTENSION: Primary | ICD-10-CM

## 2020-02-04 PROCEDURE — 99214 OFFICE O/P EST MOD 30 MIN: CPT | Performed by: INTERNAL MEDICINE

## 2020-02-04 RX ORDER — UBIDECARENONE 75 MG
50 CAPSULE ORAL DAILY
COMMUNITY

## 2020-02-04 RX ORDER — ALBUTEROL SULFATE 90 UG/1
2 AEROSOL, METERED RESPIRATORY (INHALATION) EVERY 4 HOURS PRN
Qty: 1 INHALER | Refills: 3 | Status: SHIPPED | OUTPATIENT
Start: 2020-02-04 | End: 2022-06-07

## 2020-02-04 RX ORDER — AMOXICILLIN 875 MG/1
875 TABLET, COATED ORAL 2 TIMES DAILY
Qty: 20 TABLET | Refills: 0 | Status: SHIPPED | OUTPATIENT
Start: 2020-02-04 | End: 2020-05-19

## 2020-02-04 RX ORDER — INHALER, ASSIST DEVICES
1 SPACER (EA) MISCELLANEOUS 4 TIMES DAILY PRN
Qty: 1 EACH | Refills: 3 | Status: SHIPPED | OUTPATIENT
Start: 2020-02-04 | End: 2022-06-07

## 2020-02-04 NOTE — PROGRESS NOTES
Subjective   Danielle Torres is a 69 y.o. female.     History of Present Illness   Patient was seen for hypertension.  Blood pressures been running 130s over 70s.  Patient's lipids have been treated with diet exercise and a statin.  Patient does have gastroesophageal reflux and has no symptoms over several months.  She uses over-the-counter medication.  Patient does have gout and is using allopurinol to control her symptoms.  Patient did have a nonproductive cough with occasional wheeze.  Patient was given Amoxil with albuterol inhaler.    Dictated utilizing Dragon dictation. If there are questions or for further clarification, please contact me.  The following portions of the patient's history were reviewed and updated as appropriate: allergies, current medications, past family history, past medical history, past social history, past surgical history and problem list.    Review of Systems   Constitutional: Negative for fatigue and fever.   HENT: Positive for congestion and sinus pressure. Negative for trouble swallowing.    Eyes: Negative for discharge and visual disturbance.   Respiratory: Positive for cough and wheezing. Negative for choking and shortness of breath.    Cardiovascular: Negative for chest pain and palpitations.   Gastrointestinal: Negative for abdominal pain and blood in stool.   Endocrine: Negative.    Genitourinary: Negative for genital sores and hematuria.   Musculoskeletal: Negative for gait problem and joint swelling.   Skin: Negative for color change, pallor, rash and wound.   Allergic/Immunologic: Positive for environmental allergies. Negative for immunocompromised state.   Neurological: Negative for facial asymmetry and speech difficulty.   Psychiatric/Behavioral: Negative for hallucinations and suicidal ideas.       Objective   Physical Exam   Constitutional: She is oriented to person, place, and time. She appears well-developed and well-nourished.   HENT:   Head: Normocephalic.   Eyes:  Pupils are equal, round, and reactive to light. Conjunctivae are normal.   Neck: Normal range of motion. Neck supple.   Cardiovascular: Normal rate, regular rhythm and normal heart sounds. Exam reveals no gallop and no friction rub.   No murmur heard.  Pulmonary/Chest: Effort normal. No stridor. No respiratory distress. She has wheezes. She has no rales. She exhibits no tenderness.   Abdominal: Soft. Bowel sounds are normal.   Musculoskeletal: Normal range of motion.   Neurological: She is alert and oriented to person, place, and time.   Skin: Skin is warm and dry.   Psychiatric: She has a normal mood and affect. Her behavior is normal. Judgment and thought content normal.   Nursing note and vitals reviewed.      Assessment/Plan 1 Amoxil No. 2 albuterol No. 3 continue to monitor blood pressure at home  Danielle was seen today for sore throat, cough and headache.    Diagnoses and all orders for this visit:    Essential hypertension    Pure hypercholesterolemia    Gastroesophageal reflux disease without esophagitis    Idiopathic chronic gout without tophus, unspecified site    Acute URI    Other orders  -     albuterol sulfate  (90 Base) MCG/ACT inhaler; Inhale 2 puffs Every 4 (Four) Hours As Needed for Wheezing.  -     Spacer/Aero-Holding Chambers (POCKET SPACER) device; 1 Units 4 (Four) Times a Day As Needed (wheezing).  -     amoxicillin (AMOXIL) 875 MG tablet; Take 1 tablet by mouth 2 (Two) Times a Day.

## 2020-03-16 RX ORDER — ALLOPURINOL 100 MG/1
TABLET ORAL
Qty: 90 TABLET | Refills: 0 | Status: SHIPPED | OUTPATIENT
Start: 2020-03-16 | End: 2020-03-17 | Stop reason: SDUPTHER

## 2020-03-16 RX ORDER — ALLOPURINOL 100 MG/1
TABLET ORAL
Qty: 90 TABLET | Refills: 0 | Status: SHIPPED | OUTPATIENT
Start: 2020-03-16 | End: 2020-03-16

## 2020-03-17 RX ORDER — ALLOPURINOL 100 MG/1
100 TABLET ORAL DAILY
Qty: 90 TABLET | Refills: 1 | Status: SHIPPED | OUTPATIENT
Start: 2020-03-17 | End: 2020-07-27 | Stop reason: SDUPTHER

## 2020-04-30 RX ORDER — ATORVASTATIN CALCIUM 10 MG/1
TABLET, FILM COATED ORAL
Qty: 90 TABLET | Refills: 0 | Status: SHIPPED | OUTPATIENT
Start: 2020-04-30 | End: 2020-05-05

## 2020-05-05 RX ORDER — ATORVASTATIN CALCIUM 10 MG/1
TABLET, FILM COATED ORAL
Qty: 90 TABLET | Refills: 0 | Status: SHIPPED | OUTPATIENT
Start: 2020-05-05 | End: 2020-07-27 | Stop reason: SDUPTHER

## 2020-05-19 ENCOUNTER — OFFICE VISIT (OUTPATIENT)
Dept: FAMILY MEDICINE CLINIC | Facility: CLINIC | Age: 70
End: 2020-05-19

## 2020-05-19 VITALS
TEMPERATURE: 97 F | DIASTOLIC BLOOD PRESSURE: 70 MMHG | SYSTOLIC BLOOD PRESSURE: 124 MMHG | BODY MASS INDEX: 32.65 KG/M2 | HEART RATE: 84 BPM | OXYGEN SATURATION: 99 % | HEIGHT: 67 IN | WEIGHT: 208 LBS | RESPIRATION RATE: 18 BRPM

## 2020-05-19 DIAGNOSIS — E78.00 PURE HYPERCHOLESTEROLEMIA: ICD-10-CM

## 2020-05-19 DIAGNOSIS — I10 ESSENTIAL HYPERTENSION: Primary | ICD-10-CM

## 2020-05-19 DIAGNOSIS — M1A.00X0 IDIOPATHIC CHRONIC GOUT WITHOUT TOPHUS, UNSPECIFIED SITE: ICD-10-CM

## 2020-05-19 DIAGNOSIS — Z12.11 COLON CANCER SCREENING: ICD-10-CM

## 2020-05-19 DIAGNOSIS — K63.5 POLYP OF COLON, UNSPECIFIED PART OF COLON, UNSPECIFIED TYPE: ICD-10-CM

## 2020-05-19 DIAGNOSIS — S93.401A SPRAIN OF RIGHT ANKLE, UNSPECIFIED LIGAMENT, INITIAL ENCOUNTER: ICD-10-CM

## 2020-05-19 DIAGNOSIS — E66.09 CLASS 1 OBESITY DUE TO EXCESS CALORIES WITH SERIOUS COMORBIDITY AND BODY MASS INDEX (BMI) OF 32.0 TO 32.9 IN ADULT: ICD-10-CM

## 2020-05-19 PROCEDURE — 99214 OFFICE O/P EST MOD 30 MIN: CPT | Performed by: NURSE PRACTITIONER

## 2020-05-19 NOTE — PATIENT INSTRUCTIONS
defer update labs today will recheck NCV, cont all chronic dz meds and check BP at home, Enc healthy diet and regular exercise for wt loss, gave handout ankle exercises defer PT or imaging at this time as improving, defer OTC IBU or tylenol will monitor, refer back to Dr Sheeba jenkins colonoscopy  Ankle Exercises  Ask your health care provider which exercises are safe for you. Do exercises exactly as told by your health care provider and adjust them as directed. It is normal to feel mild stretching, pulling, tightness, or mild discomfort as you do these exercises. Stop right away if you feel sudden pain or your pain gets worse. Do not begin these exercises until told by your health care provider.  Stretching and range-of-motion exercises  These exercises warm up your muscles and joints and improve the movement and flexibility of your ankle. These exercises may also help to relieve pain.  Dorsiflexion/plantar flexion    1. Sit with your __________ knee straight or bent. Do not rest your foot on anything.  2. Flex your __________ ankle to tilt the top of your foot toward your shin. This is called dorsiflexion.  3. Hold this position for __________ seconds.  4. Point your toes downward to tilt the top of your foot away from your shin. This is called plantar flexion.  5. Hold this position for __________ seconds.  Repeat __________ times. Complete this exercise __________ times a day.  Ankle alphabet    1. Sit with your __________ foot supported at your lower leg.  ? Do not rest your foot on anything.  ? Make sure your foot has room to move freely.  2. Think of your __________ foot as a paintbrush:  ? Move your foot to trace each letter of the alphabet in the air. Keep your hip and knee still while you trace the letters. Trace every letter from A to Z.  ? Make the letters as large as you can without causing or increasing any discomfort.  Repeat __________ times. Complete this exercise __________ times a day.  Passive  ankle dorsiflexion  This is an exercise in which something or someone moves your ankle for you. You do not move it yourself.  1. Sit on a chair that is placed on a non-carpeted surface.  2. Place your __________ foot on the floor, directly under your __________ knee. Extend your __________ leg for support.  3. Keeping your heel down, slide your __________ foot back toward the chair until you feel a stretch at your ankle or calf. If you do not feel a stretch, slide your buttocks forward to the edge of the chair while keeping your heel down.  4. Hold this stretch for __________ seconds.  Repeat __________ times. Complete this exercise __________ times a day.  Strengthening exercises  These exercises build strength and endurance in your ankle. Endurance is the ability to use your muscles for a long time, even after they get tired.  Dorsiflexors  These are muscles that lift your foot up.  1. Secure a rubber exercise band or tube to an object, such as a table leg, that will stay still when the band is pulled. Secure the other end around your __________ foot.  2. Sit on the floor, facing the object with your __________ leg extended. The band or tube should be slightly tense when your foot is relaxed.  3. Slowly flex your __________ ankle and toes to bring your foot toward your shin.  4. Hold this position for __________ seconds.  5. Slowly return your foot to the starting position, controlling the band as you do that.  Repeat __________ times. Complete this exercise __________ times a day.  Plantar flexors  These are muscles that push your foot down.  1. Sit on the floor with your __________ leg extended.  2. Loop a rubber exercise band or tube around the ball of your __________ foot. The ball of your foot is on the walking surface, right under your toes. The band or tube should be slightly tense when your foot is relaxed.  3. Slowly point your toes downward, pushing them away from you.  4. Hold this position for  __________ seconds.  5. Slowly release the tension in the band or tube, controlling smoothly until your foot is back in the starting position.  Repeat __________ times. Complete this exercise __________ times a day.  Towel curls    1. Sit in a chair on a non-carpeted surface, and put your feet on the floor.  2. Place a towel in front of your feet. If told by your health care provider, add a __________ pound weight to the end of the towel.  3. Keeping your heel on the floor, put your __________ foot on the towel.  4. Pull the towel toward you by grabbing the towel with your toes and curling them under. Keep your heel on the floor.  5. Let your toes relax.  6. Grab the towel again. Keep pulling the towel until it is completely underneath your foot.  Repeat __________ times. Complete this exercise __________ times a day.  Standing plantar flexion  This is an exercise in which you use your toes to lift your body's weight while standing.  1. Stand with your feet shoulder-width apart.  2. Keep your weight spread evenly over the width of your feet while you rise up on your toes. Use a wall or table to steady yourself if needed, but try not to use it for support.  3. If this exercise is too easy, try these options:  ? Shift your weight toward your __________ leg until you feel challenged.  ? If told by your health care provider, lift your uninjured leg off the floor.  4. Hold this position for __________ seconds.  Repeat __________ times. Complete this exercise __________ times a day.  Tandem walking  1. Stand with one foot directly in front of the other.  2. Slowly raise your back foot up, lifting your heel before your toes, and place it directly in front of your other foot.  3. Continue to walk in this heel-to-toe way for __________ or for as long as told by your health care provider. Have a countertop or wall nearby to use if needed to keep your balance, but try not to hold onto anything for support.  Repeat __________  times. Complete this exercise __________ times a day.  This information is not intended to replace advice given to you by your health care provider. Make sure you discuss any questions you have with your health care provider.  Document Released: 11/01/2006 Document Revised: 09/16/2019 Document Reviewed: 09/16/2019  Elsevier Interactive Patient Education © 2020 [employer]

## 2020-05-19 NOTE — PROGRESS NOTES
Subjective   Danielle Torres is a 70 y.o. female.     History of Present Illness   Here to FU on chronic well controlled HTN on amlodipine 10 mg daily no CP dizziness HA LE edema, with chronic chol on lipitor 10 mg last chol 10/19  and tolerating, admits not much exercise and no healthy diet attributes d/t pandemic weight gain 7 lbs since 10/19  C/o R medial ankle pain after rolling ankle and spraining last month, no UCC eval or imaging, states slowly getting better pain 2/10 with some stiffness, no swelling or warmth, no OTC, doesn't want to use brace or see PT as doing at home exercises, With chronic gout on allopurinol 100 mg daily and OTC cherry no recent flairs, last uric acid 06/18 normal  Last mammo 11/19 no breast pain, lumps, nipple dc  Last colonoscopy 12/16 Dr Aly removed colon polyps recommend FU 3 years, no bowel sx    The following portions of the patient's history were reviewed and updated as appropriate: allergies, current medications, past family history, past medical history, past social history, past surgical history and problem list.    Review of Systems   Constitutional: Negative for fever.   Respiratory: Negative for cough, shortness of breath and wheezing.    Cardiovascular: Negative for chest pain, palpitations and leg swelling.   Gastrointestinal: Negative for abdominal distention, abdominal pain, anal bleeding, blood in stool, constipation, diarrhea, nausea, rectal pain and vomiting.   Musculoskeletal: Positive for arthralgias. Negative for back pain, gait problem, joint swelling and myalgias.   Allergic/Immunologic: Positive for environmental allergies.   Neurological: Negative for dizziness and headaches.   All other systems reviewed and are negative.      Objective   Physical Exam   Constitutional: She is oriented to person, place, and time. She appears well-developed and well-nourished.   HENT:   Head: Normocephalic and atraumatic.   Eyes: Pupils are equal, round, and reactive  to light. Conjunctivae and EOM are normal.   Cardiovascular: Normal rate, regular rhythm and normal heart sounds.   Pulmonary/Chest: Effort normal and breath sounds normal.   Musculoskeletal: Normal range of motion. She exhibits tenderness (R medial minimal over ankle; no effusion or warmth, stiff ROM, gait unaffected). She exhibits no edema (B LE).   Neurological: She is alert and oriented to person, place, and time.   Skin: Skin is warm and dry.   Psychiatric: She has a normal mood and affect. Her behavior is normal. Judgment and thought content normal.   Vitals reviewed.      Assessment/Plan   Danielle was seen today for hypertension and ankle pain.    Diagnoses and all orders for this visit:    Essential hypertension    Pure hypercholesterolemia    Sprain of right ankle, unspecified ligament, initial encounter    Polyp of colon, unspecified part of colon, unspecified type  -     Ambulatory Referral to Gastroenterology    Colon cancer screening  -     Ambulatory Referral to Gastroenterology    Class 1 obesity due to excess calories with serious comorbidity and body mass index (BMI) of 32.0 to 32.9 in adult    Idiopathic chronic gout without tophus, unspecified site    defer update labs today will recheck NCV, cont all chronic dz meds and check BP at home, Enc healthy diet and regular exercise for wt loss, gave handout ankle exercises defer PT or imaging at this time as improving, defer OTC IBU or tylenol will monitor, refer back to Dr Aly overdue colonoscopy

## 2020-07-27 RX ORDER — AMLODIPINE BESYLATE 10 MG/1
10 TABLET ORAL DAILY
Qty: 90 TABLET | Refills: 3 | Status: SHIPPED | OUTPATIENT
Start: 2020-07-27 | End: 2021-08-13

## 2020-07-27 RX ORDER — ATORVASTATIN CALCIUM 10 MG/1
10 TABLET, FILM COATED ORAL NIGHTLY
Qty: 90 TABLET | Refills: 1 | Status: SHIPPED | OUTPATIENT
Start: 2020-07-27 | End: 2021-04-20

## 2020-07-27 RX ORDER — ALLOPURINOL 100 MG/1
100 TABLET ORAL DAILY
Qty: 90 TABLET | Refills: 1 | Status: SHIPPED | OUTPATIENT
Start: 2020-07-27 | End: 2021-04-20

## 2020-07-27 NOTE — TELEPHONE ENCOUNTER
Caller:  MITA GAGNON  Relationship: PATIENT    Best call back number: [phone]    Medication needed:   Requested Prescriptions     Pending Prescriptions Disp Refills   • amLODIPine (NORVASC) 10 MG tablet 90 tablet 3     Sig: Take 1 tablet by mouth Daily. 200001   • atorvastatin (LIPITOR) 10 MG tablet 90 tablet 0     Sig: Take 1 tablet by mouth Every Night.   • allopurinol (ZYLOPRIM) 100 MG tablet 90 tablet 1     Sig: Take 1 tablet by mouth Daily. 200001       When do you need the refill by: 7/30    Does the patient have less than a 3 day supply:  [] Yes  [x] No    What is the patient's preferred pharmacy:      Pharmacy:  [id number] - [facility] [address] [phone] [facility] [phone] FX [[id number]]

## 2020-10-05 ENCOUNTER — TRANSCRIBE ORDERS (OUTPATIENT)
Dept: ADMINISTRATIVE | Facility: HOSPITAL | Age: 70
End: 2020-10-05

## 2020-10-05 DIAGNOSIS — Z12.31 SCREENING MAMMOGRAM, ENCOUNTER FOR: Primary | ICD-10-CM

## 2021-01-06 ENCOUNTER — APPOINTMENT (OUTPATIENT)
Dept: MAMMOGRAPHY | Facility: HOSPITAL | Age: 71
End: 2021-01-06

## 2021-03-22 ENCOUNTER — BULK ORDERING (OUTPATIENT)
Dept: CASE MANAGEMENT | Facility: OTHER | Age: 71
End: 2021-03-22

## 2021-03-22 DIAGNOSIS — Z23 IMMUNIZATION DUE: ICD-10-CM

## 2021-04-20 RX ORDER — ATORVASTATIN CALCIUM 10 MG/1
TABLET, FILM COATED ORAL
Qty: 30 TABLET | Refills: 0 | Status: SHIPPED | OUTPATIENT
Start: 2021-04-20 | End: 2021-05-13 | Stop reason: SDUPTHER

## 2021-04-20 RX ORDER — ALLOPURINOL 100 MG/1
TABLET ORAL
Qty: 30 TABLET | Refills: 0 | Status: SHIPPED | OUTPATIENT
Start: 2021-04-20 | End: 2021-05-13 | Stop reason: SDUPTHER

## 2021-05-13 RX ORDER — ATORVASTATIN CALCIUM 10 MG/1
10 TABLET, FILM COATED ORAL
Qty: 15 TABLET | Refills: 0 | Status: SHIPPED | OUTPATIENT
Start: 2021-05-13 | End: 2021-07-20

## 2021-05-13 RX ORDER — ALLOPURINOL 100 MG/1
100 TABLET ORAL DAILY
Qty: 15 TABLET | Refills: 0 | Status: SHIPPED | OUTPATIENT
Start: 2021-05-13 | End: 2021-07-20

## 2021-07-20 RX ORDER — ATORVASTATIN CALCIUM 10 MG/1
TABLET, FILM COATED ORAL
Qty: 30 TABLET | Refills: 0 | Status: SHIPPED | OUTPATIENT
Start: 2021-07-20 | End: 2021-08-13

## 2021-07-20 RX ORDER — ALLOPURINOL 100 MG/1
TABLET ORAL
Qty: 30 TABLET | Refills: 0 | Status: SHIPPED | OUTPATIENT
Start: 2021-07-20 | End: 2021-08-13

## 2021-07-22 ENCOUNTER — TELEPHONE (OUTPATIENT)
Dept: FAMILY MEDICINE CLINIC | Facility: CLINIC | Age: 71
End: 2021-07-22

## 2021-07-22 RX ORDER — AZITHROMYCIN 250 MG/1
TABLET, FILM COATED ORAL
Qty: 6 TABLET | Refills: 0 | Status: SHIPPED | OUTPATIENT
Start: 2021-07-22 | End: 2021-08-04

## 2021-07-22 NOTE — TELEPHONE ENCOUNTER
Caller: Danielle Torres    Relationship: Self    Best call back number:[phone]  What medication are you requesting: ANTIBIOTIC FOR SINUS INFECTION  What are your current symptoms: SINUS PROBLEMS    How long have you been experiencing symptoms: SEVERAL WEEKS    Have you had these symptoms before:    [x] Yes  [] No    Have you been treated for these symptoms before:   [x] Yes  [] No    If a prescription is needed, what is your preferred pharmacy and phone number: [id number] [address] [phone] [facility] [phone]     Additional notes: SHE HAS TO GO OUT OF TOWN FOR A DEATH IN THE .  SHE IS ALSO UPSET BECAUSE HER COUSIN WAS SHOT.

## 2021-08-04 ENCOUNTER — OFFICE VISIT (OUTPATIENT)
Dept: FAMILY MEDICINE CLINIC | Facility: CLINIC | Age: 71
End: 2021-08-04

## 2021-08-04 VITALS
TEMPERATURE: 97.9 F | WEIGHT: 199 LBS | DIASTOLIC BLOOD PRESSURE: 78 MMHG | SYSTOLIC BLOOD PRESSURE: 112 MMHG | RESPIRATION RATE: 18 BRPM | HEIGHT: 67 IN | BODY MASS INDEX: 31.23 KG/M2 | HEART RATE: 78 BPM | OXYGEN SATURATION: 99 %

## 2021-08-04 DIAGNOSIS — E66.09 CLASS 1 OBESITY DUE TO EXCESS CALORIES WITH SERIOUS COMORBIDITY AND BODY MASS INDEX (BMI) OF 31.0 TO 31.9 IN ADULT: ICD-10-CM

## 2021-08-04 DIAGNOSIS — I10 ESSENTIAL HYPERTENSION: ICD-10-CM

## 2021-08-04 DIAGNOSIS — Z78.0 POSTMENOPAUSAL: ICD-10-CM

## 2021-08-04 DIAGNOSIS — J30.2 SEASONAL ALLERGIC RHINITIS, UNSPECIFIED TRIGGER: ICD-10-CM

## 2021-08-04 DIAGNOSIS — E78.00 PURE HYPERCHOLESTEROLEMIA: ICD-10-CM

## 2021-08-04 DIAGNOSIS — Z00.00 MEDICARE ANNUAL WELLNESS VISIT, SUBSEQUENT: Primary | ICD-10-CM

## 2021-08-04 DIAGNOSIS — Z12.31 ENCOUNTER FOR SCREENING MAMMOGRAM FOR BREAST CANCER: ICD-10-CM

## 2021-08-04 DIAGNOSIS — M1A.0410 IDIOPATHIC CHRONIC GOUT OF RIGHT HAND WITHOUT TOPHUS: ICD-10-CM

## 2021-08-04 DIAGNOSIS — J01.00 ACUTE MAXILLARY SINUSITIS, RECURRENCE NOT SPECIFIED: ICD-10-CM

## 2021-08-04 PROCEDURE — 99214 OFFICE O/P EST MOD 30 MIN: CPT | Performed by: NURSE PRACTITIONER

## 2021-08-04 PROCEDURE — G0439 PPPS, SUBSEQ VISIT: HCPCS | Performed by: NURSE PRACTITIONER

## 2021-08-04 RX ORDER — AMOXICILLIN 875 MG/1
875 TABLET, COATED ORAL 2 TIMES DAILY
Qty: 14 TABLET | Refills: 0 | Status: SHIPPED | OUTPATIENT
Start: 2021-08-04 | End: 2022-06-07

## 2021-08-04 NOTE — PROGRESS NOTES
"Chief Complaint  Sinusitis (cough productive) and Hypertension    Subjective          Danielle Torres presents to [facility]  History of Present Illness  C/o sinus congestion and pain, prod cough, sl frontal HA > 10 days, no fever or ear pain, no change in taste or smell, no SOA or wheezing, was prev in [facility] with allergy trigger with nasal congestion and frontal HA on daily allegra and flonase NS but notes had stopped when was in [facility], has already been vaccinated 04/21, With chronic HTN on amlodipine 10 mg, ASA 81 mg, no CP dizziness or B LE edema, called Dr Arciniega requested prescription sent in zpack 07/22/21 but didn't finish d/t GI upset, with chronic chol on atorvastatin 10 mg HS nonfasting today last labs 10/19, with chronic gout no recent flairs of R thumb eating cherries and taking allopurinol 100 mg daily last uric acid 06/18 4.3, last mammo 11/19 no breast pain, lumps, nipple dc, last colonoscopy 12/16 Dr Aly removed polyps, no prev dexa    Objective   Vital Signs:   /78 (BP Location: Left arm, Patient Position: Sitting)   Pulse 78   Temp 97.9 °F (36.6 °C) (Infrared)   Resp 18   Ht 170.2 cm (67\")   Wt 90.3 kg (199 lb)   SpO2 99%   BMI 31.17 kg/m²     Physical Exam  Vitals reviewed.   Constitutional:       Appearance: She is well-developed.   HENT:      Head: Normocephalic and atraumatic.      Right Ear: Hearing and tympanic membrane normal.      Left Ear: Hearing and tympanic membrane normal.      Nose: Congestion present.      Right Sinus: Maxillary sinus tenderness and frontal sinus tenderness present.      Left Sinus: Maxillary sinus tenderness and frontal sinus tenderness present.      Mouth/Throat:      Pharynx: Oropharynx is clear.   Eyes:      Conjunctiva/sclera: Conjunctivae normal.      Pupils: Pupils are equal, round, and reactive to light.   Cardiovascular:      Rate and Rhythm: Normal rate and regular rhythm.      Pulses: Normal pulses.      " Heart sounds: Normal heart sounds.   Pulmonary:      Effort: Pulmonary effort is normal.      Breath sounds: Normal breath sounds.   Abdominal:      General: There is no distension.      Palpations: Abdomen is soft. There is no mass.      Tenderness: There is no abdominal tenderness. There is no right CVA tenderness, left CVA tenderness, guarding or rebound.      Hernia: No hernia is present.   Musculoskeletal:         General: Normal range of motion.      Cervical back: Normal range of motion.      Right lower leg: No edema.      Left lower leg: No edema.   Skin:     General: Skin is warm and dry.   Neurological:      Mental Status: She is alert and oriented to person, place, and time.   Psychiatric:         Mood and Affect: Mood normal.         Behavior: Behavior normal.         Thought Content: Thought content normal.         Judgment: Judgment normal.        Result Review :                 Assessment and Plan    Diagnoses and all orders for this visit:    1. Medicare annual wellness visit, subsequent (Primary)    2. Encounter for screening mammogram for breast cancer  -     Mammo Screening Bilateral With CAD; Future    3. Postmenopausal  -     DEXA Bone Density Axial; Future    4. Acute maxillary sinusitis, recurrence not specified    5. Seasonal allergic rhinitis, unspecified trigger    6. Essential hypertension  -     CBC & Differential; Future  -     Comprehensive Metabolic Panel; Future  -     Lipid Panel; Future  -     TSH; Future  -     Urinalysis With Microscopic - Urine, Clean Catch; Future    7. Pure hypercholesterolemia  -     CBC & Differential; Future  -     Comprehensive Metabolic Panel; Future  -     Lipid Panel; Future    8. 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 Class 1 obesity due to excess calories with serious comorbidity and body mass index (BMI) of 31.0 to 31.9 in adult    Other orders  -     amoxicillin (AMOXIL) 875 MG tablet; Take 1 tablet by  mouth 2 (Two) Times a Day.  Dispense: 14 tablet; Refill: 0        Follow Up   No follow-ups on file.  Patient was given instructions and counseling regarding her condition or for health maintenance advice. Please see specific information pulled into the AVS if appropriate.   Medicare wellness today, RTC fasting check labs and call with results, erx amox 875 mg BDI x 1 wk, increase H20 and rest, continue daily flonase and allegra, check BP at home, Enc healthy diet and regular exercise for wt loss, order mammo and dexa defer CBE, gave phone number to schedule colonoscopy due 12/21 with Dr Aly

## 2021-08-04 NOTE — PROGRESS NOTES
Patient is calling saying she had CT scan elsewhere and results are being sent to Dr. Narayan.  Patient says there is a finding in the uterine cavity.   The ABCs of the Annual Wellness Visit  Subsequent Medicare Wellness Visit    Chief Complaint   Patient presents with   • Sinusitis     cough productive   • Hypertension     Subjective   History of Present Illness:  Danielle Torres is a 71 y.o. female who presents for a Subsequent Medicare Wellness Visit.    HEALTH RISK ASSESSMENT    Recent Hospitalizations:  No hospitalization(s) within the last year.    Current Medical Providers:  Patient Care Team:  Rahat Arciniega MD as PCP - General  Amelie Diaz APRN as Nurse Practitioner (Family Medicine)  DOUGLAS Bajwa MD as Consulting Physician (Ophthalmology)    Smoking Status:  Social History     Tobacco Use   Smoking Status Former Smoker   • Types: Cigarettes   • Quit date: 2000   • Years since quittin.2   Smokeless Tobacco Never Used     Alcohol Consumption:  Social History     Substance and Sexual Activity   Alcohol Use No     Depression Screen:   PHQ-2/PHQ-9 Depression Screening 2021   Little interest or pleasure in doing things 0   Feeling down, depressed, or hopeless 0   Trouble falling or staying asleep, or sleeping too much 0   Feeling tired or having little energy 0   Poor appetite or overeating 0   Feeling bad about yourself - or that you are a failure or have let yourself or your family down 0   Trouble concentrating on things, such as reading the newspaper or watching television 0   Moving or speaking so slowly that other people could have noticed. Or the opposite - being so fidgety or restless that you have been moving around a lot more than usual 0   Thoughts that you would be better off dead, or of hurting yourself in some way 0   Total Score 0   If you checked off any problems, how difficult have these problems made it for you to do your work, take care of things at home, or get along with other people? Not difficult at all     Fall Risk Screen:  STEADI Fall Risk Assessment was completed, and patient is at LOW risk for falls.Assessment  completed on:8/4/2021    Health Habits and Functional and Cognitive Screening:  Functional & Cognitive Status 8/4/2021   Do you have difficulty preparing food and eating? No   Do you have difficulty bathing yourself, getting dressed or grooming yourself? No   Do you have difficulty using the toilet? No   Do you have difficulty moving around from place to place? No   Do you have trouble with steps or getting out of a bed or a chair? No   Current Diet Well Balanced Diet   Dental Exam Up to date   Eye Exam Up to date   Exercise (times per week) 0 times per week   Current Exercises Include No Regular Exercise   Current Exercise Activities Include -   Do you need help using the phone?  No   Are you deaf or do you have serious difficulty hearing?  No   Do you need help with transportation? No   Do you need help shopping? No   Do you need help preparing meals?  No   Do you need help with housework?  No   Do you need help with laundry? No   Do you need help taking your medications? No   Do you need help managing money? No   Do you ever drive or ride in a car without wearing a seat belt? No   Have you felt unusual stress, anger or loneliness in the last month? No   Who do you live with? Spouse   If you need help, do you have trouble finding someone available to you? No   Have you been bothered in the last four weeks by sexual problems? No   Do you have difficulty concentrating, remembering or making decisions? No     Does the patient have evidence of cognitive impairment? No    Asprin use counseling:Taking ASA appropriately as indicated    Age-appropriate Screening Schedule:  Refer to the list below for future screening recommendations based on patient's age, sex and/or medical conditions. Orders for these recommended tests are listed in the plan section. The patient has been provided with a written plan.    Health Maintenance   Topic Date Due   • DXA SCAN  Never done   • ZOSTER VACCINE (2 of 2) 04/07/2017   • PAP SMEAR   01/01/2018   • LIPID PANEL  10/09/2020   • INFLUENZA VACCINE  10/01/2021   • MAMMOGRAM  11/14/2021   • TDAP/TD VACCINES (3 - Td or Tdap) 08/11/2027   • DIABETIC FOOT EXAM  Discontinued   • HEMOGLOBIN A1C  Discontinued   • DIABETIC EYE EXAM  Discontinued   • URINE MICROALBUMIN  Discontinued          The following portions of the patient's history were reviewed and updated as appropriate: allergies, current medications, past family history, past medical history, past social history, past surgical history and problem list.    Outpatient Medications Prior to Visit   Medication Sig Dispense Refill   • allopurinol (ZYLOPRIM) 100 MG tablet TAKE 1 TABLET BY MOUTH EVERY DAY  30 tablet 0   • amLODIPine (NORVASC) 10 MG tablet Take 1 tablet by mouth Daily. 200001 90 tablet 3   • aspirin 81 MG EC tablet Take 81 mg by mouth Daily.     • atorvastatin (LIPITOR) 10 MG tablet TAKE 1 TABLET BY MOUTH EVERY DAY AT NIGHT at bedtime 30 tablet 0   • STRINGER PO Take  by mouth.     • fexofenadine (ALLEGRA ALLERGY) 180 MG tablet Take 1 tablet by mouth Daily. 30 tablet 5   • fluticasone (FLONASE) 50 MCG/ACT nasal spray 2 sprays into the nostril(s) as directed by provider Daily. 1 bottle 11   • Multiple Vitamin (MULTI VITAMIN DAILY PO) Take  by mouth Daily.     • Spacer/Aero-Holding Chambers (POCKET SPACER) device 1 Units 4 (Four) Times a Day As Needed (wheezing). 1 each 3   • vitamin B-12 (CYANOCOBALAMIN) 100 MCG tablet Take 50 mcg by mouth Daily.     • albuterol sulfate  (90 Base) MCG/ACT inhaler Inhale 2 puffs Every 4 (Four) Hours As Needed for Wheezing. 1 inhaler 3   • azithromycin (Zithromax Z-Lei) 250 MG tablet Take 2 tablets by mouth on day 1, then 1 tablet daily on days 2-5 6 tablet 0     No facility-administered medications prior to visit.       Patient Active Problem List   Diagnosis   • Hypertension   • Hyperlipidemia   • Allergic rhinitis   • Idiopathic chronic gout   • Class 1 obesity due to excess calories with serious  "comorbidity and body mass index (BMI) of 32.0 to 32.9 in adult       Advanced Care Planning:  ACP discussion was held with the patient during this visit. Patient does not have an advance directive, information provided.    Review of Systems    Compared to one year ago, the patient feels her physical health is the same.  Compared to one year ago, the patient feels her mental health is the same.    Reviewed chart for potential of high risk medication in the elderly: yes  Reviewed chart for potential of harmful drug interactions in the elderly:yes    Objective         Vitals:    08/04/21 1343   BP: 112/78   BP Location: Left arm   Patient Position: Sitting   Pulse: 78   Resp: 18   Temp: 97.9 °F (36.6 °C)   TempSrc: Infrared   SpO2: 99%   Weight: 90.3 kg (199 lb)   Height: 170.2 cm (67\")   PainSc:   3   PainLoc: Head       Body mass index is 31.17 kg/m².  Discussed the patient's BMI with her. The BMI is above average; BMI management plan is completed.    Physical Exam          Assessment/Plan   Medicare Risks and Personalized Health Plan  CMS Preventative Services Quick Reference  Advance Directive Discussion  Breast Cancer/Mammogram Screening  Colon Cancer Screening  Inactivity/Sedentary  Obesity/Overweight   Osteoporosis Risk  Colonoscopy due 12/21 gave phone number to schedule with Dr Aly    The above risks/problems have been discussed with the patient.  Pertinent information has been shared with the patient in the After Visit Summary.  Follow up plans and orders are seen below in the Assessment/Plan Section.    Diagnoses and all orders for this visit:    1. Medicare annual wellness visit, subsequent (Primary)    2. Encounter for screening mammogram for breast cancer  -     Mammo Screening Bilateral With CAD; Future    3. Postmenopausal  -     DEXA Bone Density Axial; Future    4. Acute maxillary sinusitis, recurrence not specified    5. Seasonal allergic rhinitis, unspecified trigger    6. Essential " hypertension  -     CBC & Differential; Future  -     Comprehensive Metabolic Panel; Future  -     Lipid Panel; Future  -     TSH; Future  -     Urinalysis With Microscopic - Urine, Clean Catch; Future    7. Pure hypercholesterolemia  -     CBC & Differential; Future  -     Comprehensive Metabolic Panel; Future  -     Lipid Panel; Future    8. 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 Class 1 obesity due to excess calories with serious comorbidity and body mass index (BMI) of 31.0 to 31.9 in adult    Other orders  -     amoxicillin (AMOXIL) 875 MG tablet; Take 1 tablet by mouth 2 (Two) Times a Day.  Dispense: 14 tablet; Refill: 0      Follow Up:  No follow-ups on file.     An After Visit Summary and PPPS were given to the patient.

## 2021-08-04 NOTE — PATIENT INSTRUCTIONS
Medicare wellness today, RTC fasting check labs and call with results, erx amox 875 mg BDI x 1 wk, increase H20 and rest, continue daily flonase and allegra, check BP at home, Enc healthy diet and regular exercise for wt loss, order mammo and dexa defer CBE, gave phone number to schedule colonoscopy due 12/21 with Dr Aly  Advance Care Planning and Advance Directives     You make decisions on a daily basis - decisions about where you want to live, your career, your home, your life. Perhaps one of the most important decisions you face is your choice for future medical care. Take time to talk with your family and your healthcare team and start planning today.  Advance Care Planning is a process that can help you:  · Understand possible future healthcare decisions in light of your own experiences  · Reflect on those decision in light of your goals and values  · Discuss your decisions with those closest to you and the healthcare professionals that care for you  · Make a plan by creating a document that reflects your wishes    Surrogate Decision Maker  In the event of a medical emergency, which has left you unable to communicate or to make your own decisions, you would need someone to make decisions for you.  It is important to discuss your preferences for medical treatment with this person while you are in good health.     Qualities of a surrogate decision maker:  • Willing to take on this role and responsibility  • Knows what you want for future medical care  • Willing to follow your wishes even if they don't agree with them  • Able to make difficult medical decisions under stressful circumstances    Advance Directives  These are legal documents you can create that will guide your healthcare team and decision maker(s) when needed. These documents can be stored in the electronic medical record.    · Living Will - a legal document to guide your care if you have a terminal condition or a serious illness and are unable  to communicate. States vary by statute in document names/types, but most forms may include one or more of the following:        -  Directions regarding life-prolonging treatments        -  Directions regarding artificially provided nutrition/hydration        -  Choosing a healthcare decision maker        -  Direction regarding organ/tissue donation    · Durable Power of  for Healthcare - this document names an -in-fact to make medical decisions for you, but it may also allow this person to make personal and financial decisions for you. Please seek the advice of an  if you need this type of document.    **Advance Directives are not required and no one may discriminate against you if you do not sign one.    Medical Orders  Many states allow specific forms/orders signed by your physician to record your wishes for medical treatment in your current state of health. This form, signed in personal communication with your physician, addresses resuscitation and other medical interventions that you may or may not want.      For more information or to schedule a time with a [facility] Advance Care Planning Facilitator contact: [email or web address] or call [phone]    Medicare Wellness  Personal Prevention Plan of Service     Date of Office Visit:  2021  Encounter Provider:  JACKLYN Arellano  Place of Service:  [facility]  Patient Name: Danielle Torres  :  1950    As part of the Medicare Wellness portion of your visit today, we are providing you with this personalized preventive plan of services (PPPS). This plan is based upon recommendations of the [place] Preventive Services Task Force (USPSTF) and the Advisory Committee on Immunization Practices (ACIP).    This lists the preventive care services that should be considered, and provides dates of when you are due. Items listed as completed are up-to-date and do not require any further  intervention.    Health Maintenance   Topic Date Due   • DXA SCAN  Never done   • ZOSTER VACCINE (2 of 2) 04/07/2017   • PAP SMEAR  01/01/2018   • LIPID PANEL  10/09/2020   • INFLUENZA VACCINE  10/01/2021   • MAMMOGRAM  11/14/2021   • ANNUAL WELLNESS VISIT  08/04/2022   • COLORECTAL CANCER SCREENING  12/27/2026   • TDAP/TD VACCINES (3 - Td or Tdap) 08/11/2027   • HEPATITIS C SCREENING  Completed   • COVID-19 Vaccine  Completed   • Pneumococcal Vaccine 65+  Completed   • DIABETIC FOOT EXAM  Discontinued   • HEMOGLOBIN A1C  Discontinued   • DIABETIC EYE EXAM  Discontinued   • URINE MICROALBUMIN  Discontinued       Orders Placed This Encounter   Procedures   • Mammo Screening Bilateral With CAD     Standing Status:   Future     Standing Expiration Date:   8/4/2022     Order Specific Question:   Reason for Exam:     Answer:   br ca screening, please schedule same time as dexa     Order Specific Question:   Does this patient have a diabetic monitoring/medication delivering device on?     Answer:   No     Order Specific Question:   Release to patient     Answer:   Immediate   • DEXA Bone Density Axial     Standing Status:   Future     Standing Expiration Date:   8/4/2022     Order Specific Question:   Is patient taking or have taken long term Glucocorticoid (steroids)?     Answer:   No     Order Specific Question:   Does the patient have rheumatoid arthritis?     Answer:   No     Order Specific Question:   Does the patient have secondary osteoporosis?     Answer:   No     Order Specific Question:   Reason for Exam:     Answer:   postmenopausal, osteoporosis screening     Order Specific Question:   Does this patient have a diabetic monitoring/medication delivering device on?     Answer:   No     Order Specific Question:   Release to patient     Answer:   Immediate   • Comprehensive Metabolic Panel     Standing Status:   Future     Standing Expiration Date:   8/4/2022     Order Specific Question:   Release to patient      Answer:   Immediate   • Lipid Panel     Standing Status:   Future     Standing Expiration Date:   8/4/2022   • TSH     Standing Status:   Future     Standing Expiration Date:   8/4/2022     Order Specific Question:   Release to patient     Answer:   Immediate   • Uric Acid     Standing Status:   Future     Standing Expiration Date:   8/4/2022     Order Specific Question:   Release to patient     Answer:   Immediate   • CBC & Differential     Standing Status:   Future     Standing Expiration Date:   8/4/2022     Order Specific Question:   Manual Differential     Answer:   No   • Urinalysis With Microscopic - Urine, Clean Catch     Standing Status:   Future     Standing Expiration Date:   8/4/2022     Order Specific Question:   Release to patient     Answer:   Immediate       No follow-ups on file.      [phone] and someone will contact you directly.

## 2021-08-10 ENCOUNTER — LAB (OUTPATIENT)
Dept: FAMILY MEDICINE CLINIC | Facility: CLINIC | Age: 71
End: 2021-08-10

## 2021-08-10 DIAGNOSIS — M1A.0410 IDIOPATHIC CHRONIC GOUT OF RIGHT HAND WITHOUT TOPHUS: ICD-10-CM

## 2021-08-10 DIAGNOSIS — E78.00 PURE HYPERCHOLESTEROLEMIA: ICD-10-CM

## 2021-08-10 DIAGNOSIS — I10 ESSENTIAL HYPERTENSION: ICD-10-CM

## 2021-08-10 LAB
ALBUMIN SERPL-MCNC: 4.4 G/DL (ref 3.5–5.2)
ALBUMIN/GLOB SERPL: 1.6 G/DL
ALP SERPL-CCNC: 60 U/L (ref 39–117)
ALT SERPL W P-5'-P-CCNC: 22 U/L (ref 1–33)
ANION GAP SERPL CALCULATED.3IONS-SCNC: 10.4 MMOL/L (ref 5–15)
AST SERPL-CCNC: 19 U/L (ref 1–32)
BACTERIA UR QL AUTO: NORMAL /HPF
BILIRUB SERPL-MCNC: 0.4 MG/DL (ref 0–1.2)
BILIRUB UR QL STRIP: NEGATIVE
BUN SERPL-MCNC: 15 MG/DL (ref 8–23)
BUN/CREAT SERPL: 12.1 (ref 7–25)
CALCIUM SPEC-SCNC: 9.4 MG/DL (ref 8.6–10.5)
CHLORIDE SERPL-SCNC: 105 MMOL/L (ref 98–107)
CHOLEST SERPL-MCNC: 163 MG/DL (ref 0–200)
CLARITY UR: CLEAR
CO2 SERPL-SCNC: 23.6 MMOL/L (ref 22–29)
COLOR UR: YELLOW
CREAT SERPL-MCNC: 1.24 MG/DL (ref 0.57–1)
ERYTHROCYTE [DISTWIDTH] IN BLOOD BY AUTOMATED COUNT: 15.3 % (ref 12.3–15.4)
GFR SERPL CREATININE-BSD FRML MDRD: 52 ML/MIN/1.73
GLOBULIN UR ELPH-MCNC: 2.8 GM/DL
GLUCOSE SERPL-MCNC: 115 MG/DL (ref 65–99)
GLUCOSE UR STRIP-MCNC: NEGATIVE MG/DL
HCT VFR BLD AUTO: 38.1 % (ref 34–46.6)
HDLC SERPL-MCNC: 56 MG/DL (ref 40–60)
HGB BLD-MCNC: 12 G/DL (ref 12–15.9)
HGB UR QL STRIP.AUTO: NEGATIVE
KETONES UR QL STRIP: NEGATIVE
LDLC SERPL CALC-MCNC: 90 MG/DL (ref 0–100)
LDLC/HDLC SERPL: 1.57 {RATIO}
LEUKOCYTE ESTERASE UR QL STRIP.AUTO: NEGATIVE
LYMPHOCYTES # BLD AUTO: 1.1 10*3/MM3 (ref 0.7–3.1)
LYMPHOCYTES NFR BLD AUTO: 30.5 % (ref 19.6–45.3)
MCH RBC QN AUTO: 26.1 PG (ref 26.6–33)
MCHC RBC AUTO-ENTMCNC: 31.5 G/DL (ref 31.5–35.7)
MCV RBC AUTO: 82.7 FL (ref 79–97)
MONOCYTES # BLD AUTO: 0.2 10*3/MM3 (ref 0.1–0.9)
MONOCYTES NFR BLD AUTO: 6.4 % (ref 5–12)
NEUTROPHILS NFR BLD AUTO: 2.3 10*3/MM3 (ref 1.7–7)
NEUTROPHILS NFR BLD AUTO: 63.1 % (ref 42.7–76)
NITRITE UR QL STRIP: NEGATIVE
PH UR STRIP.AUTO: 6 [PH] (ref 4.6–8)
PLATELET # BLD AUTO: 285 10*3/MM3 (ref 140–450)
PMV BLD AUTO: 6.7 FL (ref 6–12)
POTASSIUM SERPL-SCNC: 4.1 MMOL/L (ref 3.5–5.2)
PROT SERPL-MCNC: 7.2 G/DL (ref 6–8.5)
PROT UR QL STRIP: ABNORMAL
RBC # BLD AUTO: 4.6 10*6/MM3 (ref 3.77–5.28)
RBC # UR: NORMAL /HPF
REF LAB TEST METHOD: NORMAL
SODIUM SERPL-SCNC: 139 MMOL/L (ref 136–145)
SP GR UR STRIP: 1.02 (ref 1–1.03)
SQUAMOUS #/AREA URNS HPF: NORMAL /HPF
TRIGL SERPL-MCNC: 95 MG/DL (ref 0–150)
TSH SERPL DL<=0.05 MIU/L-ACNC: 1.93 UIU/ML (ref 0.27–4.2)
URATE SERPL-MCNC: 4 MG/DL (ref 2.4–5.7)
UROBILINOGEN UR QL STRIP: ABNORMAL
VLDLC SERPL-MCNC: 17 MG/DL (ref 5–40)
WBC # BLD AUTO: 3.7 10*3/MM3 (ref 3.4–10.8)
WBC UR QL AUTO: NORMAL /HPF

## 2021-08-10 PROCEDURE — 84443 ASSAY THYROID STIM HORMONE: CPT | Performed by: NURSE PRACTITIONER

## 2021-08-10 PROCEDURE — 81001 URINALYSIS AUTO W/SCOPE: CPT | Performed by: NURSE PRACTITIONER

## 2021-08-10 PROCEDURE — 36415 COLL VENOUS BLD VENIPUNCTURE: CPT | Performed by: NURSE PRACTITIONER

## 2021-08-10 PROCEDURE — 80061 LIPID PANEL: CPT | Performed by: NURSE PRACTITIONER

## 2021-08-10 PROCEDURE — 84550 ASSAY OF BLOOD/URIC ACID: CPT | Performed by: NURSE PRACTITIONER

## 2021-08-10 PROCEDURE — 85025 COMPLETE CBC W/AUTO DIFF WBC: CPT | Performed by: NURSE PRACTITIONER

## 2021-08-10 PROCEDURE — 80053 COMPREHEN METABOLIC PANEL: CPT | Performed by: NURSE PRACTITIONER

## 2021-08-13 RX ORDER — ALLOPURINOL 100 MG/1
TABLET ORAL
Qty: 30 TABLET | Refills: 0 | Status: SHIPPED | OUTPATIENT
Start: 2021-08-13 | End: 2021-08-13 | Stop reason: SDUPTHER

## 2021-08-13 RX ORDER — ALLOPURINOL 100 MG/1
100 TABLET ORAL DAILY
Qty: 90 TABLET | Refills: 1 | Status: SHIPPED | OUTPATIENT
Start: 2021-08-13 | End: 2021-10-07 | Stop reason: SDUPTHER

## 2021-08-13 RX ORDER — AMLODIPINE BESYLATE 10 MG/1
TABLET ORAL
Qty: 90 TABLET | Refills: 0 | Status: SHIPPED | OUTPATIENT
Start: 2021-08-13 | End: 2021-08-13 | Stop reason: SDUPTHER

## 2021-08-13 RX ORDER — ATORVASTATIN CALCIUM 10 MG/1
TABLET, FILM COATED ORAL
Qty: 30 TABLET | Refills: 0 | Status: SHIPPED | OUTPATIENT
Start: 2021-08-13 | End: 2021-08-13 | Stop reason: SDUPTHER

## 2021-08-13 RX ORDER — AMLODIPINE BESYLATE 10 MG/1
10 TABLET ORAL DAILY
Qty: 90 TABLET | Refills: 0 | Status: SHIPPED | OUTPATIENT
Start: 2021-08-13 | End: 2022-01-04

## 2021-08-13 RX ORDER — ATORVASTATIN CALCIUM 10 MG/1
10 TABLET, FILM COATED ORAL
Qty: 90 TABLET | Refills: 1 | Status: SHIPPED | OUTPATIENT
Start: 2021-08-13 | End: 2021-10-07 | Stop reason: SDUPTHER

## 2021-08-16 ENCOUNTER — TELEPHONE (OUTPATIENT)
Dept: FAMILY MEDICINE CLINIC | Facility: CLINIC | Age: 71
End: 2021-08-16

## 2021-10-07 ENCOUNTER — CLINICAL SUPPORT (OUTPATIENT)
Dept: FAMILY MEDICINE CLINIC | Facility: CLINIC | Age: 71
End: 2021-10-07

## 2021-10-07 PROCEDURE — 90662 IIV NO PRSV INCREASED AG IM: CPT | Performed by: INTERNAL MEDICINE

## 2021-10-07 PROCEDURE — G0008 ADMIN INFLUENZA VIRUS VAC: HCPCS | Performed by: INTERNAL MEDICINE

## 2021-10-07 RX ORDER — ALLOPURINOL 100 MG/1
100 TABLET ORAL DAILY
Qty: 90 TABLET | Refills: 1 | Status: SHIPPED | OUTPATIENT
Start: 2021-10-07 | End: 2022-06-07 | Stop reason: SDUPTHER

## 2021-10-07 RX ORDER — ATORVASTATIN CALCIUM 10 MG/1
10 TABLET, FILM COATED ORAL
Qty: 90 TABLET | Refills: 1 | Status: SHIPPED | OUTPATIENT
Start: 2021-10-07 | End: 2022-06-07 | Stop reason: SDUPTHER

## 2021-10-20 ENCOUNTER — TELEPHONE (OUTPATIENT)
Dept: FAMILY MEDICINE CLINIC | Facility: CLINIC | Age: 71
End: 2021-10-20

## 2021-10-20 DIAGNOSIS — E78.00 PURE HYPERCHOLESTEROLEMIA: ICD-10-CM

## 2021-10-20 DIAGNOSIS — E55.9 VITAMIN D DEFICIENCY: ICD-10-CM

## 2021-10-20 DIAGNOSIS — I10 ESSENTIAL HYPERTENSION: Primary | ICD-10-CM

## 2021-10-20 DIAGNOSIS — M1A.0410 IDIOPATHIC CHRONIC GOUT OF RIGHT HAND WITHOUT TOPHUS: ICD-10-CM

## 2021-10-20 NOTE — TELEPHONE ENCOUNTER
Caller: Danielle Torres    Relationship: Self    Best call back number: [phone] - PATIENT GIVES VERBAL OKAY TO LEAVE A MESSAGE     What is the best time to reach you: ANY     Who are you requesting to speak with (clinical staff, provider,  specific staff member): CLINICAL STAFF     Do you know the name of the person who called: N/A     What was the call regarding: PATIENT WANTED TO KNOW WHEN SHE NEEDS TO COME IN FOR LAB WORK NEXT     Do you require a callback: YES

## 2021-11-16 ENCOUNTER — HOSPITAL ENCOUNTER (OUTPATIENT)
Dept: BONE DENSITY | Facility: HOSPITAL | Age: 71
Discharge: HOME | End: 2021-11-16

## 2021-11-16 ENCOUNTER — HOSPITAL ENCOUNTER (OUTPATIENT)
Dept: MAMMOGRAPHY | Facility: HOSPITAL | Age: 71
Discharge: HOME | End: 2021-11-16

## 2021-11-16 DIAGNOSIS — Z12.31 ENCOUNTER FOR SCREENING MAMMOGRAM FOR BREAST CANCER: ICD-10-CM

## 2021-11-16 DIAGNOSIS — Z78.0 POSTMENOPAUSAL: ICD-10-CM

## 2021-11-16 PROCEDURE — 77067 SCR MAMMO BI INCL CAD: CPT

## 2021-11-16 PROCEDURE — 77063 BREAST TOMOSYNTHESIS BI: CPT

## 2021-11-16 PROCEDURE — 77080 DXA BONE DENSITY AXIAL: CPT

## 2021-12-28 ENCOUNTER — PRE-PROCEDURE SCREENING (OUTPATIENT)
Dept: GASTROENTEROLOGY | Facility: CLINIC | Age: 71
End: 2021-12-28

## 2022-01-04 RX ORDER — AMLODIPINE BESYLATE 10 MG/1
TABLET ORAL
Qty: 90 TABLET | Refills: 0 | Status: SHIPPED | OUTPATIENT
Start: 2022-01-04 | End: 2022-06-07 | Stop reason: SDUPTHER

## 2022-01-18 ENCOUNTER — TELEPHONE (OUTPATIENT)
Dept: FAMILY MEDICINE CLINIC | Facility: CLINIC | Age: 72
End: 2022-01-18

## 2022-01-18 RX ORDER — PROMETHAZINE HYDROCHLORIDE 25 MG/1
25 TABLET ORAL EVERY 8 HOURS PRN
Qty: 30 TABLET | Refills: 3 | Status: SHIPPED | OUTPATIENT
Start: 2022-01-18 | End: 2022-06-07

## 2022-01-18 NOTE — TELEPHONE ENCOUNTER
Caller: Danielle Torres    Relationship: Self    Best call back number: [phone]    What medication are you requesting: ANYTHING TO HELP NAUSEA     What are your current symptoms: NAUSEA     How long have you been experiencing symptoms: OVER A WEEK     Have you had these symptoms before:    [x] Yes  [] No    Have you been treated for these symptoms before:   [x] Yes  [] No    If a prescription is needed, what is your preferred pharmacy and phone number: [id number] [address] [phone] [facility] [phone]      Additional notes: PATIENT STATES HER  TESTED POSITIVE FOR COVID BUT SHE TESTED NEGATIVE. PATIENT STATES SHE IS REQUESTING ANY MEDICATION TO HELP NAUSEA

## 2022-02-17 ENCOUNTER — TELEPHONE (OUTPATIENT)
Dept: FAMILY MEDICINE CLINIC | Facility: CLINIC | Age: 72
End: 2022-02-17

## 2022-02-17 NOTE — TELEPHONE ENCOUNTER
Caller: Danielle Torres    Relationship: Self    Best call back number: [phone]    What was the call regarding: PATIENT IS NEEDING METFORMIN 1000MG BID BUT IT IS NO LONGER ON HIS MED LIST. DR. JOSE WROTE THE RX LAST TIME. HE IS OUT OF MEDICATION. PLEASE CALL IN TO PHARMACY    Do you require a callback: YES PLEASE LET PATIENT KNOW WHY IT IS NOT ON MEDLIST.    [id number] - [place], [place] - [address] - [phone] [facility] [phone]   [phone]

## 2022-06-07 ENCOUNTER — OFFICE VISIT (OUTPATIENT)
Dept: FAMILY MEDICINE CLINIC | Facility: CLINIC | Age: 72
End: 2022-06-07

## 2022-06-07 VITALS
OXYGEN SATURATION: 95 % | HEIGHT: 67 IN | TEMPERATURE: 97.5 F | DIASTOLIC BLOOD PRESSURE: 80 MMHG | SYSTOLIC BLOOD PRESSURE: 112 MMHG | BODY MASS INDEX: 33.09 KG/M2 | HEART RATE: 59 BPM | WEIGHT: 210.8 LBS

## 2022-06-07 DIAGNOSIS — I10 PRIMARY HYPERTENSION: ICD-10-CM

## 2022-06-07 DIAGNOSIS — H44.812 INTRAOCULAR HEMORRHAGE OF LEFT EYE: Primary | ICD-10-CM

## 2022-06-07 DIAGNOSIS — E66.09 CLASS 1 OBESITY DUE TO EXCESS CALORIES WITH SERIOUS COMORBIDITY AND BODY MASS INDEX (BMI) OF 32.0 TO 32.9 IN ADULT: ICD-10-CM

## 2022-06-07 DIAGNOSIS — H65.02 NON-RECURRENT ACUTE SEROUS OTITIS MEDIA OF LEFT EAR: ICD-10-CM

## 2022-06-07 DIAGNOSIS — E78.00 PURE HYPERCHOLESTEROLEMIA: ICD-10-CM

## 2022-06-07 DIAGNOSIS — J02.9 SORE THROAT: ICD-10-CM

## 2022-06-07 DIAGNOSIS — M1A.0410 IDIOPATHIC CHRONIC GOUT OF RIGHT HAND WITHOUT TOPHUS: ICD-10-CM

## 2022-06-07 DIAGNOSIS — J01.00 ACUTE MAXILLARY SINUSITIS, RECURRENCE NOT SPECIFIED: ICD-10-CM

## 2022-06-07 LAB
ERYTHROCYTE [DISTWIDTH] IN BLOOD BY AUTOMATED COUNT: 14.5 % (ref 12.3–15.4)
FLUAV AG UPPER RESP QL IA.RAPID: NOT DETECTED
FLUBV AG UPPER RESP QL IA.RAPID: NOT DETECTED
HCT VFR BLD AUTO: 33.9 % (ref 34–46.6)
HGB BLD-MCNC: 11.3 G/DL (ref 12–15.9)
LYMPHOCYTES # BLD AUTO: 1.2 10*3/MM3 (ref 0.7–3.1)
LYMPHOCYTES NFR BLD AUTO: 32.5 % (ref 19.6–45.3)
MCH RBC QN AUTO: 27.7 PG (ref 26.6–33)
MCHC RBC AUTO-ENTMCNC: 33.5 G/DL (ref 31.5–35.7)
MCV RBC AUTO: 82.6 FL (ref 79–97)
MONOCYTES # BLD AUTO: 0.3 10*3/MM3 (ref 0.1–0.9)
MONOCYTES NFR BLD AUTO: 9.3 % (ref 5–12)
NEUTROPHILS NFR BLD AUTO: 2.2 10*3/MM3 (ref 1.7–7)
NEUTROPHILS NFR BLD AUTO: 58.2 % (ref 42.7–76)
PLATELET # BLD AUTO: 270 10*3/MM3 (ref 140–450)
PMV BLD AUTO: 6.3 FL (ref 6–12)
RBC # BLD AUTO: 4.1 10*6/MM3 (ref 3.77–5.28)
S PYO AG THROAT QL: NEGATIVE
SARS-COV-2 RNA PNL SPEC NAA+PROBE: NORMAL
WBC NRBC COR # BLD: 3.7 10*3/MM3 (ref 3.4–10.8)

## 2022-06-07 PROCEDURE — 87880 STREP A ASSAY W/OPTIC: CPT | Performed by: NURSE PRACTITIONER

## 2022-06-07 PROCEDURE — 87428 SARSCOV & INF VIR A&B AG IA: CPT | Performed by: NURSE PRACTITIONER

## 2022-06-07 PROCEDURE — 85025 COMPLETE CBC W/AUTO DIFF WBC: CPT | Performed by: NURSE PRACTITIONER

## 2022-06-07 PROCEDURE — 36415 COLL VENOUS BLD VENIPUNCTURE: CPT | Performed by: NURSE PRACTITIONER

## 2022-06-07 PROCEDURE — 99214 OFFICE O/P EST MOD 30 MIN: CPT | Performed by: NURSE PRACTITIONER

## 2022-06-07 RX ORDER — NEOMYCIN SULFATE, POLYMYXIN B SULFATE, HYDROCORTISONE 3.5; 10000; 1 MG/ML; [USP'U]/ML; MG/ML
4 SOLUTION/ DROPS AURICULAR (OTIC) 4 TIMES DAILY
Qty: 8 ML | Refills: 0 | Status: SHIPPED | OUTPATIENT
Start: 2022-06-07 | End: 2022-06-17

## 2022-06-07 RX ORDER — AMLODIPINE BESYLATE 10 MG/1
10 TABLET ORAL DAILY
Qty: 90 TABLET | Refills: 1 | Status: SHIPPED | OUTPATIENT
Start: 2022-06-07 | End: 2022-12-05

## 2022-06-07 RX ORDER — ATORVASTATIN CALCIUM 10 MG/1
10 TABLET, FILM COATED ORAL
Qty: 90 TABLET | Refills: 1 | Status: SHIPPED | OUTPATIENT
Start: 2022-06-07 | End: 2022-12-05

## 2022-06-07 RX ORDER — ALLOPURINOL 100 MG/1
100 TABLET ORAL DAILY
Qty: 90 TABLET | Refills: 1 | Status: SHIPPED | OUTPATIENT
Start: 2022-06-07 | End: 2022-12-05

## 2022-06-07 NOTE — ASSESSMENT & PLAN NOTE
Patient has an ophthalmology appointment in 2 weeks.  I have advised patient to call ophthalmology today to get that appointment scheduled.

## 2022-06-07 NOTE — PROGRESS NOTES
"Chief Complaint  Eye Problem (Left eye) and Sinusitis    Subjective        Danielle Torres presents to [facility]  Patient presents to the office today for left eye redness.  She denies any itching, pain, loss of vision.  She wears glasses and does not wear contacts.  Patient has an ophthalmology appointment in 2 weeks.  I have advised patient to call ophthalmology today to get that appointment scheduled.  She reports she is having left ear pain. she denies having any loss of hearing.  She does not have any ear drainage.  Blood pressure today is 112/80. She denies chest pain shortness of air.        Objective   Vital Signs:  /80 (BP Location: Left arm, Patient Position: Sitting, Cuff Size: Large Adult)   Pulse 59   Temp 97.5 °F (36.4 °C) (Infrared)   Ht 170.2 cm (67\")   Wt 95.6 kg (210 lb 12.8 oz)   SpO2 95%   BMI 33.02 kg/m²   Estimated body mass index is 33.02 kg/m² as calculated from the following:    Height as of this encounter: 170.2 cm (67\").    Weight as of this encounter: 95.6 kg (210 lb 12.8 oz).           Physical Exam  Constitutional:       Appearance: Normal appearance.   HENT:      Right Ear: Tympanic membrane, ear canal and external ear normal. No decreased hearing noted. No drainage or tenderness. No foreign body. Tympanic membrane is not perforated or erythematous.      Left Ear: External ear normal. No decreased hearing noted. Tenderness present. No drainage. No foreign body. Tympanic membrane is erythematous and bulging. Tympanic membrane is not perforated.      Nose: Nasal tenderness, mucosal edema, congestion and rhinorrhea present.      Right Turbinates: Not enlarged.      Left Turbinates: Not enlarged.      Right Sinus: Maxillary sinus tenderness and frontal sinus tenderness present.      Left Sinus: Maxillary sinus tenderness and frontal sinus tenderness present.      Mouth/Throat:      Mouth: Mucous membranes are moist.      Pharynx: Oropharyngeal " exudate and posterior oropharyngeal erythema present.   Eyes:      General:         Right eye: No discharge.         Left eye: No discharge.      Conjunctiva/sclera: Conjunctivae normal.   Cardiovascular:      Rate and Rhythm: Normal rate and regular rhythm.      Pulses: Normal pulses.      Heart sounds: Normal heart sounds. No murmur heard.  Pulmonary:      Breath sounds: Normal breath sounds. No wheezing or rhonchi.   Chest:      Chest wall: No tenderness.   Musculoskeletal:      Cervical back: No rigidity or tenderness.   Skin:     General: Skin is warm and dry.      Coloration: Skin is not pale.      Findings: No erythema.   Neurological:      Mental Status: She is alert.        Result Review :  The following data was reviewed by: JACKLYN Jaimes on 06/07/2022:  Common labs    Common Labsle 8/10/21 8/10/21 8/10/21 8/10/21 6/7/22    1032 1032 1032 1032    Glucose    115 (A)    BUN    15    Creatinine    1.24 (A)    eGFR African Am    52 (A)    Sodium    139    Potassium    4.1    Chloride    105    Calcium    9.4    Albumin    4.40    Total Bilirubin    0.4    Alkaline Phosphatase    60    AST (SGOT)    19    ALT (SGPT)    22    WBC 3.70    3.70   Hemoglobin 12.0    11.3 (A)   Hematocrit 38.1    33.9 (A)   Platelets 285    270   Total Cholesterol  163      Triglycerides  95      HDL Cholesterol  56      LDL Cholesterol   90      Uric Acid   4.0     (A) Abnormal value                     Assessment and Plan   Diagnoses and all orders for this visit:    1. Intraocular hemorrhage of left eye (Primary)  Assessment & Plan:   Patient has an ophthalmology appointment in 2 weeks.  I have advised patient to call ophthalmology today to get that appointment scheduled.        2. Acute maxillary sinusitis, recurrence not specified  -     CBC & Differential  -     Rapid Strep A Screen - Swab, Throat  -     Covid-19 + Flu A&B AG, Veritor    3. Sore throat  -     CBC & Differential  -     Rapid Strep A Screen - Swab,  Throat    4. Primary hypertension  Assessment & Plan:  Hypertension is stable 112/80 DASH diet discussed.  Continue current medication therapy    Orders:  -     amLODIPine (NORVASC) 10 MG tablet; Take 1 tablet by mouth Daily.  Dispense: 90 tablet; Refill: 1    5. Pure hypercholesterolemia  -     atorvastatin (LIPITOR) 10 MG tablet; Take 1 tablet by mouth every night at bedtime.  Dispense: 90 tablet; Refill: 1    6. Class 1 obesity due to excess calories with serious comorbidity and body mass index (BMI) of 32.0 to 32.9 in adult  Assessment & Plan:  Patient's (Body mass index is 33.02 kg/m².) indicates that they are morbidly obese (BMI > 40 or > 35 with obesity - related health condition) with health conditions that include hypertension and dyslipidemias . Weight is unchanged. BMI is is above average; BMI management plan is completed. We discussed portion control and increasing exercise.       7. 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  Dispense: 90 tablet; Refill: 1    8. Non-recurrent acute serous otitis media of left ear  -     neomycin-polymyxin-hydrocortisone (CORTISPORIN) 1 % solution otic solution; Administer 4 drops into both ears 4 (Four) Times a Day for 10 days.  Dispense: 8 mL; Refill: 0         I spent 15 minutes caring for Danielle on this date of service. This time includes time spent by me in the following activities:preparing for the visit, reviewing tests, obtaining and/or reviewing a separately obtained history, performing a medically appropriate examination and/or evaluation , counseling and educating the patient/family/caregiver and ordering medications, tests, or procedures  Follow Up   Return in about 6 months (around 12/7/2022).  Patient was given instructions and counseling regarding her condition or for health maintenance advice. Please see specific information pulled into the AVS if appropriate.

## 2022-06-07 NOTE — ASSESSMENT & PLAN NOTE
Patient's (Body mass index is 33.02 kg/m².) indicates that they are morbidly obese (BMI > 40 or > 35 with obesity - related health condition) with health conditions that include hypertension and dyslipidemias . Weight is unchanged. BMI is is above average; BMI management plan is completed. We discussed portion control and increasing exercise.

## 2022-06-08 ENCOUNTER — TELEPHONE (OUTPATIENT)
Dept: FAMILY MEDICINE CLINIC | Facility: CLINIC | Age: 72
End: 2022-06-08

## 2022-06-08 NOTE — TELEPHONE ENCOUNTER
PATIENT WAS PRESCRIBED neomycin-polymyxin-hydrocortisone (CORTISPORIN) 1 % solution otic solution AND IT'S TOO EXPENSIVE  PATIENT WANTED TO KNOW IF THERE'S AN ALTERNATIVE MORE COST EFFECTIVE    PLEASE ADVISE    [phone]

## 2022-08-29 ENCOUNTER — HOSPITAL ENCOUNTER (EMERGENCY)
Facility: HOSPITAL | Age: 72
Discharge: HOME | End: 2022-08-29
Attending: EMERGENCY MEDICINE | Admitting: EMERGENCY MEDICINE

## 2022-08-29 VITALS — OXYGEN SATURATION: 95 % | TEMPERATURE: 96.9 F | HEART RATE: 102 BPM | RESPIRATION RATE: 16 BRPM

## 2022-08-29 DIAGNOSIS — L03.012 ACUTE PARONYCHIA OF LEFT THUMB: Primary | ICD-10-CM

## 2022-08-29 PROCEDURE — 99282 EMERGENCY DEPT VISIT SF MDM: CPT

## 2022-08-29 RX ORDER — BUPIVACAINE HYDROCHLORIDE 5 MG/ML
5 INJECTION, SOLUTION EPIDURAL; INTRACAUDAL ONCE
Status: COMPLETED | OUTPATIENT
Start: 2022-08-29 | End: 2022-08-29

## 2022-08-29 RX ADMIN — BUPIVACAINE HYDROCHLORIDE 5 ML: 5 INJECTION, SOLUTION EPIDURAL; INTRACAUDAL; PERINEURAL at 16:53

## 2022-08-31 ENCOUNTER — OFFICE VISIT (OUTPATIENT)
Dept: FAMILY MEDICINE CLINIC | Facility: CLINIC | Age: 72
End: 2022-08-31

## 2022-08-31 VITALS
DIASTOLIC BLOOD PRESSURE: 76 MMHG | TEMPERATURE: 97.1 F | HEART RATE: 78 BPM | OXYGEN SATURATION: 97 % | SYSTOLIC BLOOD PRESSURE: 132 MMHG | WEIGHT: 206.4 LBS | HEIGHT: 67 IN | BODY MASS INDEX: 32.39 KG/M2

## 2022-08-31 DIAGNOSIS — E78.00 PURE HYPERCHOLESTEROLEMIA: ICD-10-CM

## 2022-08-31 DIAGNOSIS — Z00.00 MEDICARE ANNUAL WELLNESS VISIT, SUBSEQUENT: Primary | ICD-10-CM

## 2022-08-31 DIAGNOSIS — L03.012 CELLULITIS OF FINGER OF LEFT HAND: ICD-10-CM

## 2022-08-31 DIAGNOSIS — E55.9 VITAMIN D DEFICIENCY, UNSPECIFIED: ICD-10-CM

## 2022-08-31 DIAGNOSIS — I10 PRIMARY HYPERTENSION: ICD-10-CM

## 2022-08-31 DIAGNOSIS — I10 ESSENTIAL HYPERTENSION: ICD-10-CM

## 2022-08-31 DIAGNOSIS — E55.9 VITAMIN D DEFICIENCY: ICD-10-CM

## 2022-08-31 DIAGNOSIS — M1A.0410 IDIOPATHIC CHRONIC GOUT OF RIGHT HAND WITHOUT TOPHUS: ICD-10-CM

## 2022-08-31 LAB
25(OH)D3 SERPL-MCNC: 38.1 NG/ML (ref 30–100)
ALBUMIN SERPL-MCNC: 4.1 G/DL (ref 3.5–5.2)
ALBUMIN/GLOB SERPL: 1.4 G/DL
ALP SERPL-CCNC: 58 U/L (ref 39–117)
ALT SERPL W P-5'-P-CCNC: 29 U/L (ref 1–33)
ANION GAP SERPL CALCULATED.3IONS-SCNC: 9.9 MMOL/L (ref 5–15)
AST SERPL-CCNC: 19 U/L (ref 1–32)
BILIRUB SERPL-MCNC: 0.3 MG/DL (ref 0–1.2)
BUN SERPL-MCNC: 15 MG/DL (ref 8–23)
BUN/CREAT SERPL: 17.4 (ref 7–25)
CALCIUM SPEC-SCNC: 9 MG/DL (ref 8.6–10.5)
CHLORIDE SERPL-SCNC: 104 MMOL/L (ref 98–107)
CHOLEST SERPL-MCNC: 157 MG/DL (ref 0–200)
CO2 SERPL-SCNC: 26.1 MMOL/L (ref 22–29)
CREAT SERPL-MCNC: 0.86 MG/DL (ref 0.57–1)
DEPRECATED RDW RBC AUTO: 41.6 FL (ref 37–54)
EGFRCR SERPLBLD CKD-EPI 2021: 72.3 ML/MIN/1.73
ERYTHROCYTE [DISTWIDTH] IN BLOOD BY AUTOMATED COUNT: 14.6 % (ref 12.3–15.4)
GLOBULIN UR ELPH-MCNC: 2.9 GM/DL
GLUCOSE SERPL-MCNC: 111 MG/DL (ref 65–99)
HCT VFR BLD AUTO: 36.3 % (ref 34–46.6)
HDLC SERPL-MCNC: 52 MG/DL (ref 40–60)
HGB BLD-MCNC: 11.9 G/DL (ref 12–15.9)
LDLC SERPL CALC-MCNC: 87 MG/DL (ref 0–100)
LDLC/HDLC SERPL: 1.64 {RATIO}
MCH RBC QN AUTO: 26.4 PG (ref 26.6–33)
MCHC RBC AUTO-ENTMCNC: 32.8 G/DL (ref 31.5–35.7)
MCV RBC AUTO: 80.5 FL (ref 79–97)
PLATELET # BLD AUTO: 273 10*3/MM3 (ref 140–450)
PMV BLD AUTO: 9.2 FL (ref 6–12)
POTASSIUM SERPL-SCNC: 4.4 MMOL/L (ref 3.5–5.2)
PROT SERPL-MCNC: 7 G/DL (ref 6–8.5)
RBC # BLD AUTO: 4.51 10*6/MM3 (ref 3.77–5.28)
SODIUM SERPL-SCNC: 140 MMOL/L (ref 136–145)
TRIGL SERPL-MCNC: 99 MG/DL (ref 0–150)
URATE SERPL-MCNC: 4 MG/DL (ref 2.4–5.7)
VLDLC SERPL-MCNC: 18 MG/DL (ref 5–40)
WBC NRBC COR # BLD: 4.29 10*3/MM3 (ref 3.4–10.8)

## 2022-08-31 PROCEDURE — 1170F FXNL STATUS ASSESSED: CPT | Performed by: INTERNAL MEDICINE

## 2022-08-31 PROCEDURE — 80053 COMPREHEN METABOLIC PANEL: CPT | Performed by: INTERNAL MEDICINE

## 2022-08-31 PROCEDURE — 99213 OFFICE O/P EST LOW 20 MIN: CPT | Performed by: INTERNAL MEDICINE

## 2022-08-31 PROCEDURE — 85027 COMPLETE CBC AUTOMATED: CPT | Performed by: INTERNAL MEDICINE

## 2022-08-31 PROCEDURE — 36415 COLL VENOUS BLD VENIPUNCTURE: CPT | Performed by: INTERNAL MEDICINE

## 2022-08-31 PROCEDURE — 80061 LIPID PANEL: CPT | Performed by: INTERNAL MEDICINE

## 2022-08-31 PROCEDURE — G0439 PPPS, SUBSEQ VISIT: HCPCS | Performed by: INTERNAL MEDICINE

## 2022-08-31 PROCEDURE — 84550 ASSAY OF BLOOD/URIC ACID: CPT | Performed by: INTERNAL MEDICINE

## 2022-08-31 PROCEDURE — 82306 VITAMIN D 25 HYDROXY: CPT | Performed by: INTERNAL MEDICINE

## 2022-08-31 PROCEDURE — 1159F MED LIST DOCD IN RCRD: CPT | Performed by: INTERNAL MEDICINE

## 2022-08-31 RX ORDER — CIPROFLOXACIN 500 MG/1
500 TABLET, FILM COATED ORAL 2 TIMES DAILY
Qty: 28 TABLET | Refills: 0 | Status: SHIPPED | OUTPATIENT
Start: 2022-08-31 | End: 2022-12-21

## 2022-08-31 NOTE — PATIENT INSTRUCTIONS
Medicare Wellness  Personal Prevention Plan of Service     Date of Office Visit:    Encounter Provider:  Rahat Arciniega MD  Place of Service:  [facility]  Patient Name: Danielle Torres  :  1950    As part of the Medicare Wellness portion of your visit today, we are providing you with this personalized preventive plan of services (PPPS). This plan is based upon recommendations of the [place] Preventive Services Task Force (USPSTF) and the Advisory Committee on Immunization Practices (ACIP).    This lists the preventive care services that should be considered, and provides dates of when you are due. Items listed as completed are up-to-date and do not require any further intervention.    Health Maintenance   Topic Date Due    ZOSTER VACCINE (2 of 2) 2017    PAP SMEAR  2018    LIPID PANEL  08/10/2022    INFLUENZA VACCINE  10/01/2022    ANNUAL WELLNESS VISIT  2023    MAMMOGRAM  2023    DXA SCAN  2023    COLORECTAL CANCER SCREENING  2026    TDAP/TD VACCINES (3 - Td or Tdap) 2027    HEPATITIS C SCREENING  Completed    COVID-19 Vaccine  Completed    Pneumococcal Vaccine 65+  Completed    DIABETIC FOOT EXAM  Discontinued    HEMOGLOBIN A1C  Discontinued    DIABETIC EYE EXAM  Discontinued    URINE MICROALBUMIN  Discontinued       No orders of the defined types were placed in this encounter.      No follow-ups on file.

## 2022-08-31 NOTE — PROGRESS NOTES
QUICK REFERENCE INFORMATION:  The ABCs of the Annual Wellness Visit    Subsequent Medicare Wellness Visit patient was seen for Medicare wellness exam.  She was seen for cellulitis of her left thumb.  Patient was seen 2 weeks ago with [facility] for cellulitis of the left thumb.  Patient got it after going to her nail salon.  Patient was given keflex 100 mg 3 times daily and had drainage.  She states it got somewhat better but not much.  Patient was taken off keflex and put on Cipro 500 mg twice daily.  Patient did not want to go to hand specialist this time.  Patient will follow-up in 1 week.    Dictated utilizing Dragon dictation. If there are questions or for further clarification, please contact me.    HEALTH RISK ASSESSMENT    1950    Recent Hospitalizations:  No hospitalization(s) within the last year..        Current Medical Providers:  Patient Care Team:  Rahat Arciniega MD as PCP - Amelie Birmingham APRN as Nurse Practitioner (Family Medicine)  DOUGLAS Bajwa MD as Consulting Physician (Ophthalmology)        Smoking Status:  Social History     Tobacco Use   Smoking Status Former Smoker   • Types: Cigarettes   • Quit date: 2000   • Years since quittin.3   Smokeless Tobacco Never Used       Alcohol Consumption:  Social History     Substance and Sexual Activity   Alcohol Use No       Depression Screen:   PHQ-2/PHQ-9 Depression Screening 2022   Retired PHQ-9 Total Score -   Retired Total Score -   Little Interest or Pleasure in Doing Things 0-->not at all   Feeling Down, Depressed or Hopeless 0-->not at all   PHQ-9: Brief Depression Severity Measure Score 0       Health Habits and Functional and Cognitive Screening:  Functional & Cognitive Status 2022   Do you have difficulty preparing food and eating? No   Do you have difficulty bathing yourself, getting dressed or grooming yourself? No   Do you have difficulty using the toilet? No   Do you have difficulty moving  around from place to place? No   Do you have trouble with steps or getting out of a bed or a chair? No   Current Diet Well Balanced Diet   Dental Exam Up to date   Eye Exam Up to date   Exercise (times per week) 0 times per week   Current Exercises Include No Regular Exercise   Current Exercise Activities Include -   Do you need help using the phone?  No   Are you deaf or do you have serious difficulty hearing?  No   Do you need help with transportation? No   Do you need help shopping? No   Do you need help preparing meals?  No   Do you need help with housework?  No   Do you need help with laundry? No   Do you need help taking your medications? No   Do you need help managing money? No   Do you ever drive or ride in a car without wearing a seat belt? No   Have you felt unusual stress, anger or loneliness in the last month? No   Who do you live with? Spouse   If you need help, do you have trouble finding someone available to you? No   Have you been bothered in the last four weeks by sexual problems? No   Do you have difficulty concentrating, remembering or making decisions? No           Does the patient have evidence of cognitive impairment? No    Aspirin use counseling: Taking ASA appropriately as indicated      Recent Lab Results:  CMP:  Lab Results   Component Value Date    BUN 15 08/10/2021    CREATININE 1.24 (H) 08/10/2021    EGFRIFAFRI 52 (L) 08/10/2021    BCR 12.1 08/10/2021     08/10/2021    K 4.1 08/10/2021    CO2 23.6 08/10/2021    CALCIUM 9.4 08/10/2021    ALBUMIN 4.40 08/10/2021    BILITOT 0.4 08/10/2021    ALKPHOS 60 08/10/2021    AST 19 08/10/2021    ALT 22 08/10/2021     Lipid Panel:  Lab Results   Component Value Date    CHOL 163 08/10/2021    TRIG 95 08/10/2021    HDL 56 08/10/2021    VLDL 17 08/10/2021    LDLHDL 1.57 08/10/2021     HbA1c:  Lab Results   Component Value Date    HGBA1C 5.60 06/04/2018       Visual Acuity:  No exam data present    Age-appropriate Screening Schedule:  Refer to the  list below for future screening recommendations based on patient's age, sex and/or medical conditions. Orders for these recommended tests are listed in the plan section. The patient has been provided with a written plan.    Health Maintenance   Topic Date Due   • ZOSTER VACCINE (2 of 2) 04/07/2017   • PAP SMEAR  01/01/2018   • LIPID PANEL  08/10/2022   • INFLUENZA VACCINE  10/01/2022   • MAMMOGRAM  11/16/2023   • DXA SCAN  11/16/2023   • TDAP/TD VACCINES (3 - Td or Tdap) 08/11/2027   • DIABETIC FOOT EXAM  Discontinued   • HEMOGLOBIN A1C  Discontinued   • DIABETIC EYE EXAM  Discontinued   • URINE MICROALBUMIN  Discontinued        Subjective   History of Present Illness    Danielle Torres is a 72 y.o. female who presents for an Subsequent Wellness Visit.    The following portions of the patient's history were reviewed and updated as appropriate: allergies, current medications, past family history, past medical history, past social history, past surgical history and problem list.    Outpatient Medications Prior to Visit   Medication Sig Dispense Refill   • allopurinol (ZYLOPRIM) 100 MG tablet Take 1 tablet by mouth Daily. 90 tablet 1   • amLODIPine (NORVASC) 10 MG tablet Take 1 tablet by mouth Daily. 90 tablet 1   • aspirin 81 MG EC tablet Take 81 mg by mouth Daily.     • atorvastatin (LIPITOR) 10 MG tablet Take 1 tablet by mouth every night at bedtime. 90 tablet 1   • STRINGER PO Take  by mouth.     • vitamin B-12 (CYANOCOBALAMIN) 100 MCG tablet Take 50 mcg by mouth Daily.     • cephalexin (KEFLEX) 500 MG capsule Take 1 capsule by mouth 3 (Three) Times a Day for 10 days. 30 capsule 0     No facility-administered medications prior to visit.       Patient Active Problem List   Diagnosis   • Essential hypertension   • Hyperlipidemia   • Allergic rhinitis   • Idiopathic chronic gout   • Class 1 obesity due to excess calories with serious comorbidity and body mass index (BMI) of 32.0 to 32.9 in adult   • Sore throat   • Acute  maxillary sinusitis   • Non-recurrent acute serous otitis media of left ear   • Intraocular hemorrhage of left eye       Advance Care Planning:  ACP discussion was held with the patient during this visit. Patient does not have an advance directive, information provided.    Identification of Risk Factors:  Risk factors include: Advance Directive Discussion.    Review of Systems   Constitutional: Negative for fatigue and fever.   HENT: Positive for congestion. Negative for trouble swallowing.    Eyes: Negative for discharge and visual disturbance.   Respiratory: Negative for choking and shortness of breath.    Cardiovascular: Negative for chest pain and palpitations.   Gastrointestinal: Negative for abdominal pain and blood in stool.   Endocrine: Negative.    Genitourinary: Negative for genital sores and hematuria.   Musculoskeletal: Negative for gait problem and joint swelling.   Skin: Negative for color change, pallor, rash and wound.        Cellulitis left thumb   Allergic/Immunologic: Positive for environmental allergies. Negative for immunocompromised state.   Neurological: Negative for facial asymmetry and speech difficulty.   Psychiatric/Behavioral: Negative for hallucinations and suicidal ideas.       Compared to one year ago, the patient feels her physical health is the same.  Compared to one year ago, the patient feels her mental health is the same.    Objective     Physical Exam  Vitals and nursing note reviewed.   Constitutional:       Appearance: Normal appearance. She is well-developed.   HENT:      Head: Normocephalic and atraumatic.      Nose: Nose normal.      Mouth/Throat:      Mouth: Mucous membranes are moist.      Pharynx: Oropharynx is clear.   Eyes:      Extraocular Movements: Extraocular movements intact.      Conjunctiva/sclera: Conjunctivae normal.      Pupils: Pupils are equal, round, and reactive to light.   Cardiovascular:      Rate and Rhythm: Normal rate and regular rhythm.      Heart  "sounds: Normal heart sounds. No murmur heard.    No friction rub. No gallop.   Pulmonary:      Effort: Pulmonary effort is normal. No respiratory distress.      Breath sounds: Normal breath sounds. No stridor. No wheezing, rhonchi or rales.   Chest:      Chest wall: No tenderness.   Abdominal:      General: Bowel sounds are normal.      Palpations: Abdomen is soft.   Musculoskeletal:         General: Normal range of motion.      Cervical back: Normal range of motion and neck supple.   Skin:     General: Skin is warm and dry.      Comments: Cellulitis left thumb   Neurological:      General: No focal deficit present.      Mental Status: She is alert and oriented to person, place, and time. Mental status is at baseline.   Psychiatric:         Mood and Affect: Mood normal.         Behavior: Behavior normal.         Thought Content: Thought content normal.         Judgment: Judgment normal.         Vitals:    08/31/22 0827   BP: 132/76   BP Location: Left arm   Patient Position: Sitting   Cuff Size: Large Adult   Pulse: 78   Temp: 97.1 °F (36.2 °C)   TempSrc: Infrared   SpO2: 97%   Weight: 93.6 kg (206 lb 6.4 oz)   Height: 170.2 cm (67\")       BMI is >= 30 and <35. (Class 1 Obesity). The following options were offered after discussion;: exercise counseling/recommendations      Assessment & Plan 1-hand specialist #2 DC keflex, Cipro 500 mg twice daily #3 follow-up 1 week  Patient Self-Management and Personalized Health Advice  The patient has been provided with information about: exercise and preventive services including:   · NA.    Visit Diagnoses:    ICD-10-CM ICD-9-CM   1. Medicare annual wellness visit, subsequent  Z00.00 V70.0   2. Cellulitis of finger of left hand  L03.012 681.00   3. Primary hypertension  I10 401.9   4. Pure hypercholesterolemia  E78.00 272.0   5. 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 Vitamin D deficiency, unspecified   E55.9 268.9   7. Essential hypertension  " I10 401.9   8. Vitamin D deficiency  E55.9 268.9       Orders Placed This Encounter   Procedures   • Comprehensive Metabolic Panel     Standing Status:   Future     Number of Occurrences:   1     Standing Expiration Date:   8/31/2023     Order Specific Question:   Release to patient     Answer:   Immediate   • CBC (No Diff)     Standing Status:   Future     Number of Occurrences:   1     Standing Expiration Date:   8/31/2023     Order Specific Question:   Release to patient     Answer:   Immediate   • Lipid Panel     Standing Status:   Future     Number of Occurrences:   1     Standing Expiration Date:   8/31/2023   • Vitamin D 25 Hydroxy     Standing Status:   Future     Number of Occurrences:   1     Standing Expiration Date:   8/31/2023     Order Specific Question:   Release to patient     Answer:   Immediate   • Uric Acid     Standing Status:   Future     Number of Occurrences:   1     Standing Expiration Date:   8/31/2023       Outpatient Encounter Medications as of 8/31/2022   Medication Sig Dispense Refill   • allopurinol (ZYLOPRIM) 100 MG tablet Take 1 tablet by mouth Daily. 90 tablet 1   • amLODIPine (NORVASC) 10 MG tablet Take 1 tablet by mouth Daily. 90 tablet 1   • aspirin 81 MG EC tablet Take 81 mg by mouth Daily.     • atorvastatin (LIPITOR) 10 MG tablet Take 1 tablet by mouth every night at bedtime. 90 tablet 1   • STRINGER PO Take  by mouth.     • vitamin B-12 (CYANOCOBALAMIN) 100 MCG tablet Take 50 mcg by mouth Daily.     • [DISCONTINUED] cephalexin (KEFLEX) 500 MG capsule Take 1 capsule by mouth 3 (Three) Times a Day for 10 days. 30 capsule 0   • ciprofloxacin (Cipro) 500 MG tablet Take 1 tablet by mouth 2 (Two) Times a Day. 28 tablet 0     No facility-administered encounter medications on file as of 8/31/2022.       Reviewed use of high risk medication in the elderly: not applicable  Reviewed for potential of harmful drug interactions in the elderly: not applicable    Follow Up:  Return in about 1  week (around 9/7/2022), or if symptoms worsen or fail to improve, for Recheck.     An After Visit Summary and PPPS with all of these plans were given to the patient.

## 2022-10-11 ENCOUNTER — TRANSCRIBE ORDERS (OUTPATIENT)
Dept: ADMINISTRATIVE | Facility: HOSPITAL | Age: 72
End: 2022-10-11

## 2022-10-11 ENCOUNTER — TELEPHONE (OUTPATIENT)
Dept: FAMILY MEDICINE CLINIC | Facility: CLINIC | Age: 72
End: 2022-10-11

## 2022-10-11 DIAGNOSIS — Z12.31 VISIT FOR SCREENING MAMMOGRAM: Primary | ICD-10-CM

## 2022-10-11 NOTE — TELEPHONE ENCOUNTER
Caller: Danielle Torres    Relationship: Self    Best call back number: [phone]    What orders are you requesting (i.e. lab or imaging): MAMMOGRAM    In what timeframe would the patient need to come in: ASAP    Where will you receive your lab/imaging services: PATIENT CAN NOT REMEMBER WHERE SHE WENT IN THE PAST, BUT SHE WOULD LIKE TO GO TO THE SAME LOCATION.     Additional notes: PLEASE ADVISE WHEN ORDERS ARE IN

## 2022-10-19 ENCOUNTER — CLINICAL SUPPORT (OUTPATIENT)
Dept: FAMILY MEDICINE CLINIC | Facility: CLINIC | Age: 72
End: 2022-10-19

## 2022-10-19 DIAGNOSIS — Z92.29 IMMUNIZATIONS UP TO DATE: Primary | ICD-10-CM

## 2022-10-19 PROCEDURE — 90662 IIV NO PRSV INCREASED AG IM: CPT | Performed by: INTERNAL MEDICINE

## 2022-10-19 PROCEDURE — G0008 ADMIN INFLUENZA VIRUS VAC: HCPCS | Performed by: INTERNAL MEDICINE

## 2022-10-20 ENCOUNTER — TELEPHONE (OUTPATIENT)
Dept: FAMILY MEDICINE CLINIC | Facility: CLINIC | Age: 72
End: 2022-10-20

## 2022-10-20 NOTE — TELEPHONE ENCOUNTER
PT BROUGHT IN MEDICATIONS THAT ARE NOT ON HIS CURRENT MEDICATION LIST    HE HAS NOT TAKEN ANY MEDICATION TODAY, WAITING ON SOMEONE TO CALL HIM BEFORE TAKING      Atorvastatin   80mg (10 mg according to med list in chart)  One tablet every night at bedtime    Clopidogrel Bisulfate   75mg   Once a day    Farxiga   10mg     Folic Acid   400mcg    Glimepiride   2mg   Once a day    HCTZ   25mg   Once a day    Lisinopril   40mg   Once a day    Metformin   1000mg   One tablet twice daily    Metoprolol Succinate ER   25mg   Once a day

## 2022-10-20 NOTE — TELEPHONE ENCOUNTER
Continue the Farxiga and metformin and the rest of the medication.  We will take the other medication off your list.

## 2022-11-21 ENCOUNTER — HOSPITAL ENCOUNTER (OUTPATIENT)
Dept: MAMMOGRAPHY | Facility: HOSPITAL | Age: 72
Discharge: HOME | End: 2022-11-21
Admitting: INTERNAL MEDICINE

## 2022-11-21 DIAGNOSIS — Z12.31 VISIT FOR SCREENING MAMMOGRAM: ICD-10-CM

## 2022-11-21 PROCEDURE — 77063 BREAST TOMOSYNTHESIS BI: CPT

## 2022-11-21 PROCEDURE — 77067 SCR MAMMO BI INCL CAD: CPT

## 2022-12-03 DIAGNOSIS — E78.00 PURE HYPERCHOLESTEROLEMIA: ICD-10-CM

## 2022-12-03 DIAGNOSIS — M1A.0410 IDIOPATHIC CHRONIC GOUT OF RIGHT HAND WITHOUT TOPHUS: ICD-10-CM

## 2022-12-03 DIAGNOSIS — I10 PRIMARY HYPERTENSION: ICD-10-CM

## 2022-12-05 RX ORDER — ALLOPURINOL 100 MG/1
TABLET ORAL
Qty: 90 TABLET | Refills: 0 | Status: SHIPPED | OUTPATIENT
Start: 2022-12-05 | End: 2023-03-01

## 2022-12-05 RX ORDER — ATORVASTATIN CALCIUM 10 MG/1
TABLET, FILM COATED ORAL
Qty: 90 TABLET | Refills: 0 | Status: SHIPPED | OUTPATIENT
Start: 2022-12-05 | End: 2023-03-01

## 2022-12-05 RX ORDER — AMLODIPINE BESYLATE 10 MG/1
TABLET ORAL
Qty: 90 TABLET | Refills: 0 | Status: SHIPPED | OUTPATIENT
Start: 2022-12-05 | End: 2023-03-01

## 2023-02-28 DIAGNOSIS — I10 PRIMARY HYPERTENSION: ICD-10-CM

## 2023-02-28 DIAGNOSIS — E78.00 PURE HYPERCHOLESTEROLEMIA: ICD-10-CM

## 2023-02-28 DIAGNOSIS — M1A.0410 IDIOPATHIC CHRONIC GOUT OF RIGHT HAND WITHOUT TOPHUS: ICD-10-CM

## 2023-03-01 RX ORDER — AMLODIPINE BESYLATE 10 MG/1
TABLET ORAL
Qty: 90 TABLET | Refills: 0 | Status: SHIPPED | OUTPATIENT
Start: 2023-03-01

## 2023-03-01 RX ORDER — ALLOPURINOL 100 MG/1
TABLET ORAL
Qty: 90 TABLET | Refills: 0 | Status: SHIPPED | OUTPATIENT
Start: 2023-03-01

## 2023-03-01 RX ORDER — ATORVASTATIN CALCIUM 10 MG/1
TABLET, FILM COATED ORAL
Qty: 90 TABLET | Refills: 0 | Status: SHIPPED | OUTPATIENT
Start: 2023-03-01

## 2023-03-15 DIAGNOSIS — I10 PRIMARY HYPERTENSION: ICD-10-CM

## 2023-03-15 RX ORDER — AMLODIPINE BESYLATE 10 MG/1
TABLET ORAL
Qty: 90 TABLET | Refills: 0 | OUTPATIENT
Start: 2023-03-15

## 2023-05-16 DIAGNOSIS — I10 PRIMARY HYPERTENSION: ICD-10-CM

## 2023-05-16 DIAGNOSIS — E78.00 PURE HYPERCHOLESTEROLEMIA: ICD-10-CM

## 2023-05-16 DIAGNOSIS — M1A.0410 IDIOPATHIC CHRONIC GOUT OF RIGHT HAND WITHOUT TOPHUS: ICD-10-CM

## 2023-05-17 RX ORDER — ALLOPURINOL 100 MG/1
TABLET ORAL
Qty: 90 TABLET | Refills: 0 | Status: SHIPPED | OUTPATIENT
Start: 2023-05-17

## 2023-05-17 RX ORDER — AMLODIPINE BESYLATE 10 MG/1
TABLET ORAL
Qty: 90 TABLET | Refills: 0 | Status: SHIPPED | OUTPATIENT
Start: 2023-05-17

## 2023-05-17 RX ORDER — ATORVASTATIN CALCIUM 10 MG/1
TABLET, FILM COATED ORAL
Qty: 90 TABLET | Refills: 0 | Status: SHIPPED | OUTPATIENT
Start: 2023-05-17

## 2023-06-05 ENCOUNTER — OFFICE VISIT (OUTPATIENT)
Dept: FAMILY MEDICINE CLINIC | Facility: CLINIC | Age: 73
End: 2023-06-05
Payer: MEDICARE

## 2023-06-05 VITALS
HEART RATE: 74 BPM | SYSTOLIC BLOOD PRESSURE: 130 MMHG | RESPIRATION RATE: 18 BRPM | DIASTOLIC BLOOD PRESSURE: 72 MMHG | WEIGHT: 212 LBS | HEIGHT: 67 IN | BODY MASS INDEX: 33.27 KG/M2 | TEMPERATURE: 98.6 F | OXYGEN SATURATION: 99 %

## 2023-06-05 DIAGNOSIS — J30.2 SEASONAL ALLERGIC RHINITIS, UNSPECIFIED TRIGGER: ICD-10-CM

## 2023-06-05 DIAGNOSIS — J01.90 ACUTE NON-RECURRENT SINUSITIS, UNSPECIFIED LOCATION: ICD-10-CM

## 2023-06-05 DIAGNOSIS — I10 ESSENTIAL HYPERTENSION: Primary | ICD-10-CM

## 2023-06-05 DIAGNOSIS — E78.00 PURE HYPERCHOLESTEROLEMIA: ICD-10-CM

## 2023-06-05 PROCEDURE — 3075F SYST BP GE 130 - 139MM HG: CPT | Performed by: FAMILY MEDICINE

## 2023-06-05 PROCEDURE — 3078F DIAST BP <80 MM HG: CPT | Performed by: FAMILY MEDICINE

## 2023-06-05 PROCEDURE — 1159F MED LIST DOCD IN RCRD: CPT | Performed by: FAMILY MEDICINE

## 2023-06-05 PROCEDURE — 99214 OFFICE O/P EST MOD 30 MIN: CPT | Performed by: FAMILY MEDICINE

## 2023-06-05 PROCEDURE — 1160F RVW MEDS BY RX/DR IN RCRD: CPT | Performed by: FAMILY MEDICINE

## 2023-06-05 NOTE — PROGRESS NOTES
"    Chief Complaint  Hypertension    Subjective    History of Present Illness {CC  Problem List  Visit  Diagnosis   Encounters  Notes  Medications  Labs  Result Review Imaging  Media :23}     Danielle Torres presents to [facility] for   History of Present Illness   74 yo female patient of Dr. Rahat Arciniega present for follow up hypertension and fasting labs. She is taking medication daily as prescribed.   She has history of hypertension and hyperlipidemia.  Has not had blood test in several months.  Trying to monitor diet. Low sodium       Social History     Socioeconomic History    Marital status:    Tobacco Use    Smoking status: Former     Types: Cigarettes     Quit date: 2000     Years since quittin.0    Smokeless tobacco: Never   Substance and Sexual Activity    Alcohol use: No    Drug use: No      Objective     Vital Signs:   /72 (BP Location: Left arm, Patient Position: Sitting, Cuff Size: Large Adult)   Pulse 74   Temp 98.6 °F (37 °C) (Infrared)   Resp 18   Ht 170.2 cm (67\")   Wt 96.2 kg (212 lb)   SpO2 99%   BMI 33.20 kg/m²   Physical Exam  Constitutional:       General: She is not in acute distress.     Appearance: She is obese. She is not ill-appearing.   HENT:      Head: Normocephalic.   Cardiovascular:      Rate and Rhythm: Regular rhythm.      Heart sounds: Normal heart sounds.   Pulmonary:      Effort: No respiratory distress.      Breath sounds: Normal breath sounds. No wheezing.   Neurological:      Mental Status: She is alert and oriented to person, place, and time.      Result Review  Data Reviewed:{ Labs  Result Review  Imaging  Med Tab  Media :23}   The following data was reviewed by: Avelina Healy MD on 2023  Lab Results - Last 18 Months   Lab   22  0914 22  1502   BUN mg/dL  15  --    CREATININE mg/dL  0.86  --    SODIUM mmol/L  140  --    POTASSIUM mmol/L  4.4  --    CHLORIDE mmol/L  104  --  "   CALCIUM mg/dL  9.0  --    ALBUMIN g/dL  4.10  --    BILIRUBIN mg/dL  0.3  --    ALK PHOS IU/L  58  --    AST (SGOT) IU/L  19  --    ALT (SGPT) IU/L  29  --    CHOLESTEROL mg/dL   --   --    TRIGLYCERIDES mg/dL  99  --    HDL CHOL mg/dL  52  --    VLDL CHOL mg/dL   18  --    VLDL CHOLESTEROL LIANE mg/dL   --   --    LDL CHOL mg/dL  87  --    LDL/HDL RATIO    1.64  --    WBC x10E3/uL  4.29 3.70   RBC x10E6/uL  4.51 4.10   HEMATOCRIT %  36.3 33.9*   MCV fL  80.5 82.6   MCH pg  26.4* 27.7   VIT D 25 HYDROXY ng/ml   38.1  --    URIC ACID mg/dL   4.0  --               Current Outpatient Medications:     allopurinol (ZYLOPRIM) 100 MG tablet, TAKE 1 TABLET BY MOUTH EVERY DAY, Disp: 90 tablet, Rfl: 0    amLODIPine (NORVASC) 10 MG tablet, TAKE 1 TABLET BY MOUTH EVERY DAY, Disp: 90 tablet, Rfl: 0    aspirin 81 MG EC tablet, Take 1 tablet by mouth Daily., Disp: , Rfl:     atorvastatin (LIPITOR) 10 MG tablet, TAKE 1 TABLET BY MOUTH AT BEDTIME, Disp: 90 tablet, Rfl: 0    CHERRY PO, Take  by mouth., Disp: , Rfl:     Coenzyme Q10 (CO Q 10 PO), Take 200 Int'l Units by mouth., Disp: , Rfl:     vitamin B-12 (CYANOCOBALAMIN) 100 MCG tablet, Take 50 mcg by mouth Daily., Disp: , Rfl:     fluticasone (FLONASE) 50 MCG/ACT nasal spray, 2 sprays into the nostril(s) as directed by provider Daily. (Patient not taking: Reported on 6/5/2023), Disp: 9.9 mL, Rfl: 0      Assessment and Plan {CC Problem List  Visit Diagnosis  ROS  Review (Popup)  Health Maintenance  Quality  BestPractice  Medications  SmartSets  SnapShot Encounters  Media :23}   Problem List Items Addressed This Visit          Allergies and Adverse Reactions    Allergic rhinitis       Cardiac and Vasculature    Essential hypertension - Primary    Relevant Orders    CBC w AUTO Differential (Completed)    Comprehensive metabolic panel (Completed)    Hyperlipidemia    Relevant Orders    Comprehensive metabolic panel (Completed)    Lipid panel (Completed)     Other Visit  Diagnoses       Acute non-recurrent sinusitis, unspecified location             Hypertension - chronic, stable continue current medication. Monitor kidney function     Hyperlipidemia- chronic, monitor lipid function, check panel continue current medication     New patient/establish care visit next appointment        Follow Up   Return in about 3 months (around 9/5/2023) for please move up new patient appt to sept available 30 min appt .  Patient was given instructions and counseling regarding her condition or for health maintenance advice. Please see specific information pulled into the AVS if appropriate.    EpicAct:[email or web address]_ORDERS,RunParams:STARTUPTYPE=FOLLOW    [email or web address]_DISCHARGE

## 2023-06-05 NOTE — PROGRESS NOTES
"    Chief Complaint  Establish Care    Subjective    History of Present Illness {CC  Problem List  Visit  Diagnosis   Encounters  Notes  Medications  Labs  Result Review Imaging  Media :23}     Danielle Torres presents to [facility] for   History of Present Illness         Social History     Socioeconomic History    Marital status:    Tobacco Use    Smoking status: Former     Types: Cigarettes     Quit date: 2000     Years since quittin.0    Smokeless tobacco: Never   Substance and Sexual Activity    Alcohol use: No    Drug use: No      Objective     Vital Signs:   /72 (BP Location: Left arm, Patient Position: Sitting, Cuff Size: Large Adult)   Pulse 74   Temp 98.6 °F (37 °C) (Infrared)   Resp 18   Ht 170.2 cm (67\")   Wt 96.2 kg (212 lb)   SpO2 99%   BMI 33.20 kg/m²   Physical Exam   Result Review  Data Reviewed:{ Labs  Result Review  Imaging  Med Tab  Media :23}   The following data was reviewed by: Avelina Healy MD on 2023  Lab Results - Last 18 Months   Lab Units 22  0914 22  1502   BUN mg/dL 15  --    CREATININE mg/dL 0.86  --    SODIUM mmol/L 140  --    POTASSIUM mmol/L 4.4  --    CHLORIDE mmol/L 104  --    CALCIUM mg/dL 9.0  --    ALBUMIN g/dL 4.10  --    BILIRUBIN mg/dL 0.3  --    ALK PHOS U/L 58  --    AST (SGOT) U/L 19  --    ALT (SGPT) U/L 29  --    TRIGLYCERIDES mg/dL 99  --    HDL CHOL mg/dL 52  --    VLDL CHOL mg/dL 18  --    LDL CHOL mg/dL 87  --    LDL/HDL RATIO  1.64  --    WBC 10*3/mm3 4.29 3.70   RBC 10*6/mm3 4.51 4.10   HEMATOCRIT % 36.3 33.9*   MCV fL 80.5 82.6   MCH pg 26.4* 27.7   VIT D 25 HYDROXY ng/ml 38.1  --    URIC ACID mg/dL 4.0  --               Current Outpatient Medications:     allopurinol (ZYLOPRIM) 100 MG tablet, TAKE 1 TABLET BY MOUTH EVERY DAY, Disp: 90 tablet, Rfl: 0    amLODIPine (NORVASC) 10 MG tablet, TAKE 1 TABLET BY MOUTH EVERY DAY, Disp: 90 tablet, Rfl: 0    aspirin 81 MG EC " tablet, Take 1 tablet by mouth Daily., Disp: , Rfl:     atorvastatin (LIPITOR) 10 MG tablet, TAKE 1 TABLET BY MOUTH AT BEDTIME, Disp: 90 tablet, Rfl: 0    CHERRY PO, Take  by mouth., Disp: , Rfl:     Coenzyme Q10 (CO Q 10 PO), Take 200 Int'l Units by mouth., Disp: , Rfl:     vitamin B-12 (CYANOCOBALAMIN) 100 MCG tablet, Take 50 mcg by mouth Daily., Disp: , Rfl:     amoxicillin-clavulanate (AUGMENTIN) 875-125 MG per tablet, Take 1 tablet by mouth 2 (Two) Times a Day., Disp: 20 tablet, Rfl: 0    fluticasone (FLONASE) 50 MCG/ACT nasal spray, 2 sprays into the nostril(s) as directed by provider Daily. (Patient not taking: Reported on 6/5/2023), Disp: 9.9 mL, Rfl: 0    promethazine-dextromethorphan (PROMETHAZINE-DM) 6.25-15 MG/5ML syrup, Take 5 mL by mouth 4 (Four) Times a Day As Needed for Cough. (Patient not taking: Reported on 6/5/2023), Disp: 240 mL, Rfl: 0      Assessment and Plan {CC Problem List  Visit Diagnosis  ROS  Review (Popup)  Health Maintenance  Quality  BestPractice  Medications  SmartSets  SnapShot Encounters  Media :23}   Problem List Items Addressed This Visit    None      Follow Up   No follow-ups on file.  Patient was given instructions and counseling regarding her condition or for health maintenance advice. Please see specific information pulled into the AVS if appropriate.    EpicAct:[email or web address]_ORDERS,RunParams:STARTUPTYPE=FOLLOW    [email or web address]_DISCHARGE

## 2023-06-06 LAB
ALBUMIN SERPL-MCNC: 4.4 G/DL (ref 3.7–4.7)
ALBUMIN/GLOB SERPL: 1.6 {RATIO} (ref 1.2–2.2)
ALP SERPL-CCNC: 73 IU/L (ref 44–121)
ALT SERPL-CCNC: 35 IU/L (ref 0–32)
AST SERPL-CCNC: 25 IU/L (ref 0–40)
BASOPHILS # BLD AUTO: 0 X10E3/UL (ref 0–0.2)
BASOPHILS NFR BLD AUTO: 1 %
BILIRUB SERPL-MCNC: 0.5 MG/DL (ref 0–1.2)
BUN SERPL-MCNC: 15 MG/DL (ref 8–27)
BUN/CREAT SERPL: 17 (ref 12–28)
CALCIUM SERPL-MCNC: 9.2 MG/DL (ref 8.7–10.3)
CHLORIDE SERPL-SCNC: 102 MMOL/L (ref 96–106)
CHOLEST SERPL-MCNC: 158 MG/DL (ref 100–199)
CO2 SERPL-SCNC: 23 MMOL/L (ref 20–29)
CREAT SERPL-MCNC: 0.86 MG/DL (ref 0.57–1)
EGFRCR SERPLBLD CKD-EPI 2021: 72 ML/MIN/1.73
EOSINOPHIL # BLD AUTO: 0.1 X10E3/UL (ref 0–0.4)
EOSINOPHIL NFR BLD AUTO: 1 %
ERYTHROCYTE [DISTWIDTH] IN BLOOD BY AUTOMATED COUNT: 14.4 % (ref 11.7–15.4)
GLOBULIN SER CALC-MCNC: 2.8 G/DL (ref 1.5–4.5)
GLUCOSE SERPL-MCNC: 102 MG/DL (ref 70–99)
HCT VFR BLD AUTO: 37.2 % (ref 34–46.6)
HDLC SERPL-MCNC: 58 MG/DL
HGB BLD-MCNC: 12.2 G/DL (ref 11.1–15.9)
IMM GRANULOCYTES # BLD AUTO: 0 X10E3/UL (ref 0–0.1)
IMM GRANULOCYTES NFR BLD AUTO: 0 %
LDLC SERPL CALC-MCNC: 80 MG/DL (ref 0–99)
LYMPHOCYTES # BLD AUTO: 1 X10E3/UL (ref 0.7–3.1)
LYMPHOCYTES NFR BLD AUTO: 24 %
MCH RBC QN AUTO: 26.6 PG (ref 26.6–33)
MCHC RBC AUTO-ENTMCNC: 32.8 G/DL (ref 31.5–35.7)
MCV RBC AUTO: 81 FL (ref 79–97)
MONOCYTES # BLD AUTO: 0.5 X10E3/UL (ref 0.1–0.9)
MONOCYTES NFR BLD AUTO: 11 %
NEUTROPHILS # BLD AUTO: 2.6 X10E3/UL (ref 1.4–7)
NEUTROPHILS NFR BLD AUTO: 63 %
PLATELET # BLD AUTO: 279 X10E3/UL (ref 150–450)
POTASSIUM SERPL-SCNC: 3.8 MMOL/L (ref 3.5–5.2)
PROT SERPL-MCNC: 7.2 G/DL (ref 6–8.5)
RBC # BLD AUTO: 4.58 X10E6/UL (ref 3.77–5.28)
SODIUM SERPL-SCNC: 141 MMOL/L (ref 134–144)
TRIGL SERPL-MCNC: 111 MG/DL (ref 0–149)
VLDLC SERPL CALC-MCNC: 20 MG/DL (ref 5–40)
WBC # BLD AUTO: 4.2 X10E3/UL (ref 3.4–10.8)

## 2023-08-01 DIAGNOSIS — E78.00 PURE HYPERCHOLESTEROLEMIA: ICD-10-CM

## 2023-08-01 DIAGNOSIS — M1A.0410 IDIOPATHIC CHRONIC GOUT OF RIGHT HAND WITHOUT TOPHUS: ICD-10-CM

## 2023-08-01 DIAGNOSIS — I10 PRIMARY HYPERTENSION: ICD-10-CM

## 2023-08-01 RX ORDER — AMLODIPINE BESYLATE 10 MG/1
TABLET ORAL
Qty: 90 TABLET | Refills: 0 | Status: SHIPPED | OUTPATIENT
Start: 2023-08-01

## 2023-08-01 RX ORDER — ALLOPURINOL 100 MG/1
TABLET ORAL
Qty: 90 TABLET | Refills: 0 | Status: SHIPPED | OUTPATIENT
Start: 2023-08-01

## 2023-08-01 RX ORDER — ATORVASTATIN CALCIUM 10 MG/1
TABLET, FILM COATED ORAL
Qty: 90 TABLET | Refills: 0 | Status: SHIPPED | OUTPATIENT
Start: 2023-08-01

## 2023-09-21 ENCOUNTER — OFFICE VISIT (OUTPATIENT)
Dept: FAMILY MEDICINE CLINIC | Facility: CLINIC | Age: 73
End: 2023-09-21
Payer: MEDICARE

## 2023-09-21 VITALS
TEMPERATURE: 97.5 F | BODY MASS INDEX: 33.18 KG/M2 | OXYGEN SATURATION: 96 % | WEIGHT: 211.4 LBS | HEIGHT: 67 IN | HEART RATE: 60 BPM | SYSTOLIC BLOOD PRESSURE: 136 MMHG | DIASTOLIC BLOOD PRESSURE: 84 MMHG

## 2023-09-21 DIAGNOSIS — E78.5 HYPERLIPIDEMIA, UNSPECIFIED HYPERLIPIDEMIA TYPE: ICD-10-CM

## 2023-09-21 DIAGNOSIS — I10 ESSENTIAL HYPERTENSION: ICD-10-CM

## 2023-09-21 DIAGNOSIS — R73.9 ELEVATED BLOOD SUGAR: Primary | ICD-10-CM

## 2023-09-21 PROCEDURE — 3075F SYST BP GE 130 - 139MM HG: CPT | Performed by: FAMILY MEDICINE

## 2023-09-21 PROCEDURE — 1160F RVW MEDS BY RX/DR IN RCRD: CPT | Performed by: FAMILY MEDICINE

## 2023-09-21 PROCEDURE — 3079F DIAST BP 80-89 MM HG: CPT | Performed by: FAMILY MEDICINE

## 2023-09-21 PROCEDURE — 1159F MED LIST DOCD IN RCRD: CPT | Performed by: FAMILY MEDICINE

## 2023-09-21 PROCEDURE — 99214 OFFICE O/P EST MOD 30 MIN: CPT | Performed by: FAMILY MEDICINE

## 2023-09-21 NOTE — PROGRESS NOTES
"    Chief Complaint  Hypertension (Follow up/)    Subjective    History of Present Illness {CC  Problem List  Visit  Diagnosis   Encounters  Notes  Medications  Labs  Result Review Imaging  Media :23}     Danielle Torres presents to [facility] for   History of Present Illness   74 yo female present for follow up visit. Pt states she is doing well over all. However increase stress with son illness.    She is trying to care for him and it is hard.    She is taking her medication as prescribed.   Social History     Socioeconomic History    Marital status:    Tobacco Use    Smoking status: Former     Types: Cigarettes     Quit date: 2000     Years since quittin.3    Smokeless tobacco: Never   Substance and Sexual Activity    Alcohol use: No    Drug use: No      Objective     Vital Signs:   /84 (BP Location: Left arm, Patient Position: Sitting, Cuff Size: Large Adult)   Pulse 60   Temp 97.5 °F (36.4 °C)   Ht 170.2 cm (67.01\")   Wt 95.9 kg (211 lb 6.4 oz)   SpO2 96%   BMI 33.10 kg/m²   Physical Exam  Constitutional:       General: She is not in acute distress.     Appearance: She is obese. She is not ill-appearing.   HENT:      Head: Normocephalic.   Cardiovascular:      Rate and Rhythm: Regular rhythm.      Heart sounds: Normal heart sounds.   Pulmonary:      Breath sounds: Normal breath sounds. No wheezing.   Musculoskeletal:      Right lower leg: No edema.      Left lower leg: No edema.   Neurological:      Mental Status: She is alert.      Result Review  Data Reviewed:{ Labs  Result Review  Imaging  Med Tab  Media :23}   The following data was reviewed by: Avelina Healy MD on 2023  Lab Results - Last 18 Months   Lab Units 23  1506 22  0914 22  1502   BUN mg/dL 15 15  --    CREATININE mg/dL 0.86 0.86  --    SODIUM mmol/L 141 140  --    POTASSIUM mmol/L 3.8 4.4  --    CHLORIDE mmol/L 102 104  --    CALCIUM mg/dL 9.2 9.0  " --    ALBUMIN g/dL 4.4 4.10  --    BILIRUBIN mg/dL 0.5 0.3  --    ALK PHOS IU/L 73 58  --    AST (SGOT) IU/L 25 19  --    ALT (SGPT) IU/L 35* 29  --    CHOLESTEROL mg/dL 158  --   --    TRIGLYCERIDES mg/dL 111 99  --    HDL CHOL mg/dL 58 52  --    VLDL CHOL mg/dL  --  18  --    VLDL CHOLESTEROL LIANE mg/dL 20  --   --    LDL CHOL mg/dL 80 87  --    LDL/HDL RATIO   --  1.64  --    WBC x10E3/uL 4.2 4.29 3.70   RBC x10E6/uL 4.58 4.51 4.10   HEMATOCRIT % 37.2 36.3 33.9*   MCV fL 81 80.5 82.6   MCH pg 26.6 26.4* 27.7   VIT D 25 HYDROXY ng/ml  --  38.1  --    URIC ACID mg/dL  --  4.0  --               Current Outpatient Medications:     allopurinol (ZYLOPRIM) 100 MG tablet, TAKE 1 TABLET BY MOUTH EVERY DAY, Disp: 90 tablet, Rfl: 0    amLODIPine (NORVASC) 10 MG tablet, TAKE 1 TABLET BY MOUTH EVERY DAY, Disp: 90 tablet, Rfl: 0    aspirin 81 MG EC tablet, Take 1 tablet by mouth Daily., Disp: , Rfl:     atorvastatin (LIPITOR) 10 MG tablet, TAKE 1 TABLET BY MOUTH EVERY DAY AT BEDTIME., Disp: 90 tablet, Rfl: 0    CHERRY PO, Take  by mouth., Disp: , Rfl:     Coenzyme Q10 (CO Q 10 PO), Take 200 Int'l Units by mouth., Disp: , Rfl:     Misc Natural Products (YUMVS BEET ROOT-TART CHERRY PO), Take  by mouth., Disp: , Rfl:     vitamin B-12 (CYANOCOBALAMIN) 100 MCG tablet, Take 0.5 tablets by mouth Daily., Disp: , Rfl:     fluticasone (FLONASE) 50 MCG/ACT nasal spray, 2 sprays into the nostril(s) as directed by provider Daily., Disp: 9.9 mL, Rfl: 0      Assessment and Plan {CC Problem List  Visit Diagnosis  ROS  Review (Popup)  Health Maintenance  Quality  BestPractice  Medications  SmartSets  SnapShot Encounters  Media :23}   Problem List Items Addressed This Visit          Cardiac and Vasculature    Essential hypertension    Relevant Orders    Comprehensive metabolic panel (Completed)    CBC No Differential (Completed)    Hyperlipidemia    Relevant Orders    Comprehensive metabolic panel (Completed)    Lipid panel  (Completed)     Other Visit Diagnoses       Elevated blood sugar    -  Primary    Relevant Orders    Hemoglobin A1c (Completed)          Hypertension - continue current medication, low sodium diet recheck in a few weeks.     Hyperlipidemia-chronic, continue medication as prescribed. Low fat high fiber diet.         Follow Up   Return for with new pcp.  Patient was given instructions and counseling regarding her condition or for health maintenance advice. Please see specific information pulled into the AVS if appropriate.    EpicAct:[email or web address]_ORDERS,RunParams:STARTUPTYPE=FOLLOW    [email or web address]_DISCHARGE

## 2023-09-22 LAB
ALBUMIN SERPL-MCNC: 4.4 G/DL (ref 3.8–4.8)
ALBUMIN/GLOB SERPL: 1.7 {RATIO} (ref 1.2–2.2)
ALP SERPL-CCNC: 67 IU/L (ref 44–121)
ALT SERPL-CCNC: 32 IU/L (ref 0–32)
AST SERPL-CCNC: 23 IU/L (ref 0–40)
BILIRUB SERPL-MCNC: 0.4 MG/DL (ref 0–1.2)
BUN SERPL-MCNC: 15 MG/DL (ref 8–27)
BUN/CREAT SERPL: 17 (ref 12–28)
CALCIUM SERPL-MCNC: 9.3 MG/DL (ref 8.7–10.3)
CHLORIDE SERPL-SCNC: 102 MMOL/L (ref 96–106)
CHOLEST SERPL-MCNC: 178 MG/DL (ref 100–199)
CO2 SERPL-SCNC: 26 MMOL/L (ref 20–29)
CREAT SERPL-MCNC: 0.87 MG/DL (ref 0.57–1)
EGFRCR SERPLBLD CKD-EPI 2021: 71 ML/MIN/1.73
ERYTHROCYTE [DISTWIDTH] IN BLOOD BY AUTOMATED COUNT: 14 % (ref 11.7–15.4)
GLOBULIN SER CALC-MCNC: 2.6 G/DL (ref 1.5–4.5)
GLUCOSE SERPL-MCNC: 99 MG/DL (ref 70–99)
HBA1C MFR BLD: 6.5 % (ref 4.8–5.6)
HCT VFR BLD AUTO: 37 % (ref 34–46.6)
HDLC SERPL-MCNC: 59 MG/DL
HGB BLD-MCNC: 11.8 G/DL (ref 11.1–15.9)
LDLC SERPL CALC-MCNC: 100 MG/DL (ref 0–99)
MCH RBC QN AUTO: 26.3 PG (ref 26.6–33)
MCHC RBC AUTO-ENTMCNC: 31.9 G/DL (ref 31.5–35.7)
MCV RBC AUTO: 82 FL (ref 79–97)
PLATELET # BLD AUTO: 270 X10E3/UL (ref 150–450)
POTASSIUM SERPL-SCNC: 4.2 MMOL/L (ref 3.5–5.2)
PROT SERPL-MCNC: 7 G/DL (ref 6–8.5)
RBC # BLD AUTO: 4.49 X10E6/UL (ref 3.77–5.28)
SODIUM SERPL-SCNC: 141 MMOL/L (ref 134–144)
TRIGL SERPL-MCNC: 106 MG/DL (ref 0–149)
VLDLC SERPL CALC-MCNC: 19 MG/DL (ref 5–40)
WBC # BLD AUTO: 4.2 X10E3/UL (ref 3.4–10.8)

## 2023-10-04 DIAGNOSIS — I10 PRIMARY HYPERTENSION: ICD-10-CM

## 2023-10-04 DIAGNOSIS — M1A.0410 IDIOPATHIC CHRONIC GOUT OF RIGHT HAND WITHOUT TOPHUS: ICD-10-CM

## 2023-10-04 DIAGNOSIS — E78.00 PURE HYPERCHOLESTEROLEMIA: ICD-10-CM

## 2023-10-05 RX ORDER — ALLOPURINOL 100 MG/1
TABLET ORAL
Qty: 90 TABLET | Refills: 0 | Status: SHIPPED | OUTPATIENT
Start: 2023-10-05

## 2023-10-05 RX ORDER — AMLODIPINE BESYLATE 10 MG/1
TABLET ORAL
Qty: 90 TABLET | Refills: 0 | Status: SHIPPED | OUTPATIENT
Start: 2023-10-05

## 2023-10-05 RX ORDER — ATORVASTATIN CALCIUM 10 MG/1
TABLET, FILM COATED ORAL
Qty: 90 TABLET | Refills: 0 | Status: SHIPPED | OUTPATIENT
Start: 2023-10-05

## 2023-10-06 ENCOUNTER — OFFICE VISIT (OUTPATIENT)
Dept: FAMILY MEDICINE CLINIC | Facility: CLINIC | Age: 73
End: 2023-10-06
Payer: MEDICARE

## 2023-10-06 VITALS
HEIGHT: 67 IN | WEIGHT: 209 LBS | HEART RATE: 78 BPM | TEMPERATURE: 98.1 F | SYSTOLIC BLOOD PRESSURE: 146 MMHG | BODY MASS INDEX: 32.8 KG/M2 | OXYGEN SATURATION: 97 % | DIASTOLIC BLOOD PRESSURE: 84 MMHG

## 2023-10-06 DIAGNOSIS — R73.03 PREDIABETES: Primary | ICD-10-CM

## 2023-10-06 PROCEDURE — 3077F SYST BP >= 140 MM HG: CPT | Performed by: FAMILY MEDICINE

## 2023-10-06 PROCEDURE — 3079F DIAST BP 80-89 MM HG: CPT | Performed by: FAMILY MEDICINE

## 2023-10-06 PROCEDURE — 99213 OFFICE O/P EST LOW 20 MIN: CPT | Performed by: FAMILY MEDICINE

## 2023-10-06 PROCEDURE — 90662 IIV NO PRSV INCREASED AG IM: CPT | Performed by: FAMILY MEDICINE

## 2023-10-06 PROCEDURE — G0008 ADMIN INFLUENZA VIRUS VAC: HCPCS | Performed by: FAMILY MEDICINE

## 2023-10-06 NOTE — PROGRESS NOTES
"    Chief Complaint  Diabetes (Medication options) and Flu Vaccine    Subjective    History of Present Illness {CC  Problem List  Visit  Diagnosis   Encounters  Notes  Medications  Labs  Result Review Imaging  Media :23}     Danielle Torres presents to [facility] for      72 yo female present for discussion of recent labs.  Most recent lab test show A1c elevated 6.5.  She would like to manage with diet and lifestyle changes.  She is aware of changes she needs to make to diet. Has been to nutrition classes previously with .  She has already made changes to diet, including cutting back on soda.       Social History     Socioeconomic History    Marital status:    Tobacco Use    Smoking status: Former     Types: Cigarettes     Quit date: 2000     Years since quittin.4    Smokeless tobacco: Never   Substance and Sexual Activity    Alcohol use: No    Drug use: No      Objective     Vital Signs:   /84   Pulse 78   Temp 98.1 °F (36.7 °C)   Ht 170.2 cm (67\")   Wt 94.8 kg (209 lb)   SpO2 97%   BMI 32.73 kg/m²   Physical Exam  Constitutional:       General: She is not in acute distress.     Appearance: She is obese. She is not ill-appearing.   Neurological:      Mental Status: She is alert.        Result Review  Data Reviewed:{ Labs  Result Review  Imaging  Med Tab  Media :23}   The following data was reviewed by: Avelina Healy MD on 10/06/2023  Lab Results - Last 18 Months   Lab Units 23  1454 23  1506 22  0914   BUN mg/dL 15 15 15   CREATININE mg/dL 0.87 0.86 0.86   SODIUM mmol/L 141 141 140   POTASSIUM mmol/L 4.2 3.8 4.4   CHLORIDE mmol/L 102 102 104   CALCIUM mg/dL 9.3 9.2 9.0   ALBUMIN g/dL 4.4 4.4 4.10   BILIRUBIN mg/dL 0.4 0.5 0.3   ALK PHOS IU/L 67 73 58   AST (SGOT) IU/L 23 25 19   ALT (SGPT) IU/L 32 35* 29   CHOLESTEROL mg/dL 178 158  --    TRIGLYCERIDES mg/dL 106 111 99   HDL CHOL mg/dL 59 58 52   VLDL CHOL mg/dL  --  "  --  18   VLDL CHOLESTEROL LIANE mg/dL 19 20  --    LDL CHOL mg/dL 100* 80 87   LDL/HDL RATIO   --   --  1.64   HEMOGLOBIN A1C % 6.5*  --   --    WBC x10E3/uL 4.2 4.2 4.29   RBC x10E6/uL 4.49 4.58 4.51   HEMATOCRIT % 37.0 37.2 36.3   MCV fL 82 81 80.5   MCH pg 26.3* 26.6 26.4*   VIT D 25 HYDROXY ng/ml  --   --  38.1   URIC ACID mg/dL  --   --  4.0              Current Outpatient Medications:     allopurinol (ZYLOPRIM) 100 MG tablet, TAKE 1 TABLET BY MOUTH EVERY DAY, Disp: 90 tablet, Rfl: 0    amLODIPine (NORVASC) 10 MG tablet, TAKE 1 TABLET BY MOUTH EVERY DAY, Disp: 90 tablet, Rfl: 0    aspirin 81 MG EC tablet, Take 1 tablet by mouth Daily., Disp: , Rfl:     atorvastatin (LIPITOR) 10 MG tablet, TAKE 1 TABLET BY MOUTH AT BEDTIME, Disp: 90 tablet, Rfl: 0    CHERRY PO, Take  by mouth., Disp: , Rfl:     Coenzyme Q10 (CO Q 10 PO), Take 200 Int'l Units by mouth., Disp: , Rfl:     Misc Natural Products (YUMVS BEET ROOT-TART CHERRY PO), Take  by mouth., Disp: , Rfl:     vitamin B-12 (CYANOCOBALAMIN) 100 MCG tablet, Take 0.5 tablets by mouth Daily., Disp: , Rfl:       Assessment and Plan {CC Problem List  Visit Diagnosis  ROS  Review (Popup)  Health Maintenance  Quality  BestPractice  Medications  SmartSets  SnapShot Encounters  Media :23}   Problem List Items Addressed This Visit       Prediabetes - Primary     A1c now up at 6.5 discussed with patient meaning if 6.5 and older for diabetes. Pt would like to manage currently with dietary and life style changes prior to taking medication. Advise to exercise daily, at least 30 min.  Advise to have close follow up with new pcp in 3 months to recheck A1c       Follow Up   Return if symptoms worsen or fail to improve.  Patient was given instructions and counseling regarding her condition or for health maintenance advice. Please see specific information pulled into the AVS if appropriate.    EpicAct:[email or web address]_ORDERS,RunParams:STARTUPTYPE=FOLLOW    [email or web address]_DISCHARGE

## 2023-10-11 ENCOUNTER — TRANSCRIBE ORDERS (OUTPATIENT)
Dept: ADMINISTRATIVE | Facility: HOSPITAL | Age: 73
End: 2023-10-11
Payer: MEDICARE

## 2023-10-11 DIAGNOSIS — Z12.31 SCREENING MAMMOGRAM FOR BREAST CANCER: Primary | ICD-10-CM

## 2023-11-17 DIAGNOSIS — I10 PRIMARY HYPERTENSION: ICD-10-CM

## 2023-11-20 RX ORDER — AMLODIPINE BESYLATE 10 MG/1
TABLET ORAL
Qty: 90 TABLET | Refills: 0 | Status: SHIPPED | OUTPATIENT
Start: 2023-11-20

## 2023-11-28 ENCOUNTER — HOSPITAL ENCOUNTER (OUTPATIENT)
Dept: MAMMOGRAPHY | Facility: HOSPITAL | Age: 73
Discharge: HOME | End: 2023-11-28
Admitting: FAMILY MEDICINE
Payer: MEDICARE

## 2023-11-28 DIAGNOSIS — Z12.31 SCREENING MAMMOGRAM FOR BREAST CANCER: ICD-10-CM

## 2023-11-28 PROCEDURE — 77063 BREAST TOMOSYNTHESIS BI: CPT

## 2023-11-28 PROCEDURE — 77067 SCR MAMMO BI INCL CAD: CPT

## 2023-12-18 DIAGNOSIS — M1A.0410 IDIOPATHIC CHRONIC GOUT OF RIGHT HAND WITHOUT TOPHUS: ICD-10-CM

## 2023-12-18 DIAGNOSIS — E78.00 PURE HYPERCHOLESTEROLEMIA: ICD-10-CM

## 2023-12-18 DIAGNOSIS — I10 PRIMARY HYPERTENSION: ICD-10-CM

## 2023-12-18 RX ORDER — ALLOPURINOL 100 MG/1
TABLET ORAL
Qty: 90 TABLET | Refills: 0 | Status: SHIPPED | OUTPATIENT
Start: 2023-12-18

## 2023-12-18 RX ORDER — ATORVASTATIN CALCIUM 10 MG/1
TABLET, FILM COATED ORAL
Qty: 90 TABLET | Refills: 0 | Status: SHIPPED | OUTPATIENT
Start: 2023-12-18

## 2023-12-18 RX ORDER — AMLODIPINE BESYLATE 10 MG/1
TABLET ORAL
Qty: 90 TABLET | Refills: 0 | Status: SHIPPED | OUTPATIENT
Start: 2023-12-18

## 2024-03-12 DIAGNOSIS — E78.00 PURE HYPERCHOLESTEROLEMIA: ICD-10-CM

## 2024-03-12 DIAGNOSIS — M1A.0410 IDIOPATHIC CHRONIC GOUT OF RIGHT HAND WITHOUT TOPHUS: ICD-10-CM

## 2024-03-12 DIAGNOSIS — I10 PRIMARY HYPERTENSION: ICD-10-CM

## 2024-03-12 RX ORDER — ATORVASTATIN CALCIUM 10 MG/1
TABLET, FILM COATED ORAL
Qty: 90 TABLET | Refills: 0 | Status: SHIPPED | OUTPATIENT
Start: 2024-03-12

## 2024-03-12 RX ORDER — ALLOPURINOL 100 MG/1
TABLET ORAL
Qty: 90 TABLET | Refills: 0 | Status: SHIPPED | OUTPATIENT
Start: 2024-03-12

## 2024-03-12 RX ORDER — AMLODIPINE BESYLATE 10 MG/1
TABLET ORAL
Qty: 90 TABLET | Refills: 0 | Status: SHIPPED | OUTPATIENT
Start: 2024-03-12

## 2024-04-02 ENCOUNTER — OFFICE VISIT (OUTPATIENT)
Dept: FAMILY MEDICINE CLINIC | Facility: CLINIC | Age: 74
End: 2024-04-02
Payer: MEDICARE

## 2024-04-02 ENCOUNTER — HOSPITAL ENCOUNTER (OUTPATIENT)
Dept: GENERAL RADIOLOGY | Facility: HOSPITAL | Age: 74
Discharge: HOME | End: 2024-04-02
Payer: MEDICARE

## 2024-04-02 VITALS
WEIGHT: 203.6 LBS | TEMPERATURE: 98.7 F | HEART RATE: 95 BPM | BODY MASS INDEX: 31.96 KG/M2 | OXYGEN SATURATION: 96 % | HEIGHT: 67 IN | SYSTOLIC BLOOD PRESSURE: 140 MMHG | DIASTOLIC BLOOD PRESSURE: 80 MMHG

## 2024-04-02 DIAGNOSIS — M79.631 RIGHT FOREARM PAIN: ICD-10-CM

## 2024-04-02 DIAGNOSIS — Z78.0 POST-MENOPAUSAL: ICD-10-CM

## 2024-04-02 DIAGNOSIS — E66.09 CLASS 1 OBESITY DUE TO EXCESS CALORIES WITH SERIOUS COMORBIDITY AND BODY MASS INDEX (BMI) OF 32.0 TO 32.9 IN ADULT: Chronic | ICD-10-CM

## 2024-04-02 DIAGNOSIS — E11.9 TYPE 2 DIABETES MELLITUS WITHOUT COMPLICATION, WITHOUT LONG-TERM CURRENT USE OF INSULIN: Chronic | ICD-10-CM

## 2024-04-02 DIAGNOSIS — Z86.010 HX OF COLONOSCOPY WITH POLYPECTOMY: ICD-10-CM

## 2024-04-02 DIAGNOSIS — E78.00 PURE HYPERCHOLESTEROLEMIA: Chronic | ICD-10-CM

## 2024-04-02 DIAGNOSIS — I10 ESSENTIAL HYPERTENSION: Chronic | ICD-10-CM

## 2024-04-02 DIAGNOSIS — Z76.89 ENCOUNTER TO ESTABLISH CARE: Primary | ICD-10-CM

## 2024-04-02 DIAGNOSIS — M25.531 RIGHT WRIST PAIN: ICD-10-CM

## 2024-04-02 DIAGNOSIS — Z98.890 HX OF COLONOSCOPY WITH POLYPECTOMY: ICD-10-CM

## 2024-04-02 DIAGNOSIS — M1A.0410 IDIOPATHIC CHRONIC GOUT OF RIGHT HAND WITHOUT TOPHUS: Chronic | ICD-10-CM

## 2024-04-02 DIAGNOSIS — Z12.11 COLON CANCER SCREENING: ICD-10-CM

## 2024-04-02 PROBLEM — Z86.0100 HX OF COLONOSCOPY WITH POLYPECTOMY: Status: ACTIVE | Noted: 2024-04-02

## 2024-04-02 PROBLEM — J01.00 ACUTE MAXILLARY SINUSITIS: Status: RESOLVED | Noted: 2022-06-07 | Resolved: 2024-04-02

## 2024-04-02 PROBLEM — R73.03 PREDIABETES: Status: RESOLVED | Noted: 2018-01-05 | Resolved: 2024-04-02

## 2024-04-02 PROBLEM — J02.9 SORE THROAT: Status: RESOLVED | Noted: 2022-06-07 | Resolved: 2024-04-02

## 2024-04-02 PROBLEM — H65.02 NON-RECURRENT ACUTE SEROUS OTITIS MEDIA OF LEFT EAR: Status: RESOLVED | Noted: 2022-06-07 | Resolved: 2024-04-02

## 2024-04-02 PROCEDURE — 73110 X-RAY EXAM OF WRIST: CPT

## 2024-04-02 PROCEDURE — 73090 X-RAY EXAM OF FOREARM: CPT

## 2024-04-02 RX ORDER — AMLODIPINE BESYLATE 10 MG/1
10 TABLET ORAL DAILY
Qty: 90 TABLET | Refills: 1 | Status: SHIPPED | OUTPATIENT
Start: 2024-04-02

## 2024-04-02 RX ORDER — ATORVASTATIN CALCIUM 10 MG/1
10 TABLET, FILM COATED ORAL
Qty: 90 TABLET | Refills: 1 | Status: SHIPPED | OUTPATIENT
Start: 2024-04-02

## 2024-04-02 RX ORDER — BLOOD-GLUCOSE METER
1 KIT MISCELLANEOUS AS NEEDED
Qty: 1 EACH | Refills: 0 | Status: SHIPPED | OUTPATIENT
Start: 2024-04-02 | End: 2024-05-02

## 2024-04-02 RX ORDER — ATORVASTATIN CALCIUM 10 MG/1
10 TABLET, FILM COATED ORAL
Qty: 90 TABLET | Refills: 0 | Status: SHIPPED | OUTPATIENT
Start: 2024-04-02 | End: 2024-04-02

## 2024-04-02 RX ORDER — BISMUTH SUBSALICYLATE 262MG/15ML
1 SUSPENSION, ORAL (FINAL DOSE FORM) ORAL 2 TIMES DAILY
Qty: 100 EACH | Refills: 5 | Status: SHIPPED | OUTPATIENT
Start: 2024-04-02

## 2024-04-02 RX ORDER — ALLOPURINOL 100 MG/1
100 TABLET ORAL DAILY
Qty: 90 TABLET | Refills: 0 | Status: SHIPPED | OUTPATIENT
Start: 2024-04-02

## 2024-04-02 RX ORDER — ASPIRIN 81 MG/1
81 TABLET ORAL DAILY
Qty: 90 TABLET | Refills: 3 | Status: SHIPPED | OUTPATIENT
Start: 2024-04-02

## 2024-04-02 RX ORDER — AMLODIPINE BESYLATE 10 MG/1
10 TABLET ORAL DAILY
Qty: 90 TABLET | Refills: 0 | Status: SHIPPED | OUTPATIENT
Start: 2024-04-02 | End: 2024-04-02

## 2024-04-02 NOTE — ASSESSMENT & PLAN NOTE
Diabetes is  we will recheck A1c today .  Patient expressed interest in weight loss desires a GLP-1 agonist.  Informed patient will then make a decision after today's A1c result.  Continue current treatment regimen.  Reminded to bring in blood sugar diary at next visit.  Recommended an ADA diet.  Recommended a Mediterranean style of eating  Regular aerobic exercise.  Discussed ways to avoid symptomatic hypoglycemia.  Discussed sick day management.  Discussed foot care.  Reminded to get yearly retinal exam.  Diabetes will be reassessed in 3 months  Discussed diabetes management and need for adequate BS control. Educated patient high A1c puts him/her at risk for MI, CVA, CKD, gastroparesis, foot ulcers, and frequent infections.   Pt informed of Rx for glucometer/test strips, and lancets as needed and explained to keep a blood sugar log. Return to office as instructed. Additionally diet compliance explained - avoid sugar candy, soda, high carbohydrate loads. Explained how losing weight can improve your health and achieve better blood sugar control. Being active can also help prevent or control the disorder. Recommended annual opthalmology follow -up due to diagnosis of diabetes. Annual Podiatry visit prn.

## 2024-04-02 NOTE — ASSESSMENT & PLAN NOTE
Lipid abnormalities are stable    Plan:  Continue same medication/s without change.      Discussed medication dosage, use, side effects, and goals of treatment in detail.    Counseled patient on lifestyle modifications to help control hyperlipidemia.     Patient Treatment Goals:   LDL goal is under 100      Discussed the importance of healthy diet, nutrition, and lifestyle. Recommend low salt, fat/cholesterol diet and avoid concentrated sweets. Encouraged DASH diet along with fresh fruits & vegetables and low fat dairy products. Counseled patient to exercise/walk as tolerated. Avoid tobacco and alcohol use.    Followup in 6 months.

## 2024-04-02 NOTE — PROGRESS NOTES
"Chief Complaint  Hand Pain (Right wrist for about 7 months)    Subjective        Danielle Torres presents to [facility]  History of Present Illness  74yo AAF Patient was previously seen by PCP Dr. Healy at [facility], however patient is new to me and is here for establishment of care visit for chronic medical conditions including gout, new onset diabetes, hypertension.    Pt reports did not take HTN med yet today.    Pt reports back in October she hit her right forearm and wrist to a wall while carrying her laundry basket, and still in pain of right forearm/wrist.    Instructed patient to follow-up with an eye doctor, either opthalmology or optometry with preference for opthalmology, for an annual diabetic eye exam.      Hand Pain         Objective   Vital Signs:  /80 (BP Location: Left arm, Patient Position: Sitting, Cuff Size: Adult)   Pulse 95   Temp 98.7 °F (37.1 °C) (Temporal)   Ht 170.2 cm (67.01\")   Wt 92.4 kg (203 lb 9.6 oz)   SpO2 96%   BMI 31.88 kg/m²   Estimated body mass index is 31.88 kg/m² as calculated from the following:    Height as of this encounter: 170.2 cm (67.01\").    Weight as of this encounter: 92.4 kg (203 lb 9.6 oz).       BMI is >= 30 and <35. (Class 1 Obesity). The following options were offered after discussion;: exercise counseling/recommendations and nutrition counseling/recommendations      Physical Exam  Constitutional:       Appearance: Normal appearance.   HENT:      Head: Normocephalic and atraumatic.   Eyes:      Conjunctiva/sclera: Conjunctivae normal.   Cardiovascular:      Rate and Rhythm: Normal rate and regular rhythm.      Heart sounds: Normal heart sounds.   Pulmonary:      Effort: Pulmonary effort is normal.      Breath sounds: Normal breath sounds.   Abdominal:      General: Bowel sounds are normal.      Palpations: Abdomen is soft.      Comments: Non-tender   Musculoskeletal:      Comments: " Bruise on right distil lateral forearm and the area is tender to palpation.    Also right wrist pain with ROM laterally.  Stiffness of right wrist with flexion/extension motion.   Skin:     General: Skin is warm.   Neurological:      General: No focal deficit present.      Mental Status: She is alert and oriented to person, place, and time.   Psychiatric:         Mood and Affect: Mood normal.         Behavior: Behavior normal.        Result Review :    The following data was reviewed by: JACKLYN Gerard on 04/02/2024:  CMP          6/5/2023    15:06 9/21/2023    14:54   CMP   Glucose 102  99    BUN 15  15    Creatinine 0.86  0.87    Sodium 141  141    Potassium 3.8  4.2    Chloride 102  102    Calcium 9.2  9.3    Total Protein 7.2  7.0    Albumin 4.4  4.4    Globulin 2.8  2.6    Total Bilirubin 0.5  0.4    Alkaline Phosphatase 73  67    AST (SGOT) 25  23    ALT (SGPT) 35  32    BUN/Creatinine Ratio 17  17      CBC          6/5/2023    15:06 9/21/2023    14:54   CBC   WBC 4.2  4.2    RBC 4.58  4.49    Hemoglobin 12.2  11.8    Hematocrit 37.2  37.0    MCV 81  82    MCH 26.6  26.3    MCHC 32.8  31.9    RDW 14.4  14.0    Platelets 279  270      Lipid Panel          6/5/2023    15:06 9/21/2023    14:54   Lipid Panel   Total Cholesterol 158  178    Triglycerides 111  106    HDL Cholesterol 58  59    VLDL Cholesterol 20  19    LDL Cholesterol  80  100        A1C Last 3 Results          9/21/2023    14:54   HGBA1C Last 3 Results   Hemoglobin A1C 6.5          Data reviewed : Radiologic studies reviewed last mammogram from late 2023 which was normal discussed results with patient and GI studies reviewed colonoscopy from 2016 patient status post polypectomy and need for repeat colonoscopy, patient voiced understanding.             Assessment and Plan     Diagnoses and all orders for this visit:    1. Encounter to establish care (Primary)    2. Right forearm pain  -     Ambulatory Referral to Hand Surgery  -      XR Wrist 3+ View Right; Future  -     XR forearm 2 vw right; Future    3. Right wrist pain  -     Ambulatory Referral to Hand Surgery  -     XR Wrist 3+ View Right; Future  -     XR forearm 2 vw right; Future    4. Essential hypertension  Assessment & Plan:  Hypertension is stable and controlled  Continue current treatment regimen.  Dietary sodium restriction.  Weight loss.  Regular aerobic exercise.  Ambulatory blood pressure monitoring.  Blood pressure will be reassessed in 3 months.    Discussed the importance of healthy diet, nutrition, and lifestyle. Recommend low salt, fat/cholesterol diet and avoid concentrated sweets. Encouraged DASH diet along with fresh fruits & vegetables and low fat dairy products. Counseled patient to exercise/walk as tolerated. Avoid tobacco and alcohol use.      Orders:  -     CBC & Differential  -     Comprehensive Metabolic Panel  -     MicroAlbumin, Urine, Random - Urine, Clean Catch  -     amLODIPine (NORVASC) 10 MG tablet; Take 1 tablet by mouth Daily.  Dispense: 90 tablet; Refill: 1    5. Pure hypercholesterolemia  Assessment & Plan:   Lipid abnormalities are stable    Plan:  Continue same medication/s without change.      Discussed medication dosage, use, side effects, and goals of treatment in detail.    Counseled patient on lifestyle modifications to help control hyperlipidemia.     Patient Treatment Goals:   LDL goal is under 100      Discussed the importance of healthy diet, nutrition, and lifestyle. Recommend low salt, fat/cholesterol diet and avoid concentrated sweets. Encouraged DASH diet along with fresh fruits & vegetables and low fat dairy products. Counseled patient to exercise/walk as tolerated. Avoid tobacco and alcohol use.    Followup in 6 months.    Orders:  -     Comprehensive Metabolic Panel  -     Lipid Panel  -     Discontinue: atorvastatin (LIPITOR) 10 MG tablet; Take 1 tablet by mouth every night at bedtime.  Dispense: 90 tablet; Refill: 0  -      atorvastatin (LIPITOR) 10 MG tablet; Take 1 tablet by mouth every night at bedtime.  Dispense: 90 tablet; Refill: 1    6. Class 1 obesity due to excess calories with serious comorbidity and body mass index (BMI) of 32.0 to 32.9 in adult  Assessment & Plan:  Patient's (Body mass index is 31.88 kg/m².) indicates that they are obese (BMI >30) with health conditions that include hypertension, diabetes mellitus, and dyslipidemias . Weight is improving with lifestyle modifications. BMI  is above average; BMI management plan is completed. We discussed portion control and increasing exercise.   Discussed the importance of healthy diet, nutrition, and lifestyle. Recommend low salt, fat/cholesterol diet and avoid concentrated sweets. Encouraged DASH diet along with fresh fruits & vegetables and low fat dairy products. Counseled patient to exercise/walk as tolerated. Avoid tobacco and alcohol use.        7. 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    Orders:  -     Uric acid  -     allopurinol (ZYLOPRIM) 100 MG tablet; Take 1 tablet by mouth Daily.  Dispense: 90 tablet; Refill: 0    8. Type 2 diabetes mellitus without complication, without long-term current use of insulin  Assessment & Plan:  Diabetes is  we will recheck A1c today .  Patient expressed interest in weight loss desires a GLP-1 agonist.  Informed patient will then make a decision after today's A1c result.  Continue current treatment regimen.  Reminded to bring in blood sugar diary at next visit.  Recommended an ADA diet.  Recommended a Mediterranean style of eating  Regular aerobic exercise.  Discussed ways to avoid symptomatic hypoglycemia.  Discussed sick day management.  Discussed foot care.  Reminded to get yearly retinal exam.  Diabetes will be reassessed in 3 months  Discussed diabetes management and need for adequate BS control. Educated patient high A1c puts him/her at risk for MI,  CVA, CKD, gastroparesis, foot ulcers, and frequent infections.   Pt informed of Rx for glucometer/test strips, and lancets as needed and explained to keep a blood sugar log. Return to office as instructed. Additionally diet compliance explained - avoid sugar candy, soda, high carbohydrate loads. Explained how losing weight can improve your health and achieve better blood sugar control. Being active can also help prevent or control the disorder. Recommended annual opthalmology follow -up due to diagnosis of diabetes. Annual Podiatry visit prn.      Orders:  -     Hemoglobin A1c  -     MicroAlbumin, Urine, Random - Urine, Clean Catch  -     C-Peptide  -     Lancets Ultra Thin 30G misc; Inject 1 each under the skin into the appropriate area as directed 2 (Two) Times a Day.  Dispense: 100 each; Refill: 5  -     glucose monitor monitoring kit; Use 1 each As Needed (DM-2) for up to 30 days.  Dispense: 1 each; Refill: 0  -     glucose blood test strip; Twice Daily  Dispense: 100 each; Refill: 5    9. Post-menopausal  -     DEXA Bone Density Axial; Future    10. Hx of colonoscopy with polypectomy  -     Ambulatory Referral to General Surgery    11. Colon cancer screening  -     Ambulatory Referral to General Surgery    Other orders  -     Discontinue: amLODIPine (NORVASC) 10 MG tablet; Take 1 tablet by mouth Daily.  Dispense: 90 tablet; Refill: 0  -     aspirin 81 MG EC tablet; Take 1 tablet by mouth Daily.  Dispense: 90 tablet; Refill: 3    Discussed the triad of uncontrolled hypertension, Diabetes, Hyperlipidemia along with Tobacco that accelerate the risk of new or repeat MI or CVA and CKD; and the need to adequately manage these 3 chronic medical conditions and monitor their relevant labs and parameters; patient voiced understanding.        All chronic conditions have been addressed and treated by the practice or other specialists. Medications have been reconciled and refilled as appropriate. Reiterated compliance and  timely follow up appointments. Side effects of all new and old medications reviewed with the patient and patient willing to accept all risks involved. Advised RTO if no improvement or worsening of symptoms or if any new complaints arise. Patient advised to follow up with clinic or call after diagnostic tests, if patient does not hear from office 3 days after the test completion.            Follow Up     Return in about 3 months (around 7/2/2024) for Next scheduled follow up, Medicare Wellness.  Patient was given instructions and counseling regarding her condition or for health maintenance advice. Please see specific information pulled into the AVS if appropriate.

## 2024-04-02 NOTE — ASSESSMENT & PLAN NOTE
Patient's (Body mass index is 31.88 kg/m².) indicates that they are obese (BMI >30) with health conditions that include hypertension, diabetes mellitus, and dyslipidemias . Weight is improving with lifestyle modifications. BMI  is above average; BMI management plan is completed. We discussed portion control and increasing exercise.   Discussed the importance of healthy diet, nutrition, and lifestyle. Recommend low salt, fat/cholesterol diet and avoid concentrated sweets. Encouraged DASH diet along with fresh fruits & vegetables and low fat dairy products. Counseled patient to exercise/walk as tolerated. Avoid tobacco and alcohol use.

## 2024-04-02 NOTE — ASSESSMENT & PLAN NOTE
Hypertension is stable and controlled  Continue current treatment regimen.  Dietary sodium restriction.  Weight loss.  Regular aerobic exercise.  Ambulatory blood pressure monitoring.  Blood pressure will be reassessed in 3 months.    Discussed the importance of healthy diet, nutrition, and lifestyle. Recommend low salt, fat/cholesterol diet and avoid concentrated sweets. Encouraged DASH diet along with fresh fruits & vegetables and low fat dairy products. Counseled patient to exercise/walk as tolerated. Avoid tobacco and alcohol use.

## 2024-04-03 LAB
ALBUMIN SERPL-MCNC: 4.5 G/DL (ref 3.8–4.8)
ALBUMIN/GLOB SERPL: 1.8 {RATIO} (ref 1.2–2.2)
ALP SERPL-CCNC: 71 IU/L (ref 44–121)
ALT SERPL-CCNC: 27 IU/L (ref 0–32)
AST SERPL-CCNC: 21 IU/L (ref 0–40)
BASOPHILS # BLD AUTO: 0 X10E3/UL (ref 0–0.2)
BASOPHILS NFR BLD AUTO: 1 %
BILIRUB SERPL-MCNC: 0.4 MG/DL (ref 0–1.2)
BUN SERPL-MCNC: 14 MG/DL (ref 8–27)
BUN/CREAT SERPL: 15 (ref 12–28)
C PEPTIDE SERPL-MCNC: 3.8 NG/ML (ref 1.1–4.4)
CALCIUM SERPL-MCNC: 9.8 MG/DL (ref 8.7–10.3)
CHLORIDE SERPL-SCNC: 104 MMOL/L (ref 96–106)
CHOLEST SERPL-MCNC: 166 MG/DL (ref 100–199)
CO2 SERPL-SCNC: 26 MMOL/L (ref 20–29)
CREAT SERPL-MCNC: 0.92 MG/DL (ref 0.57–1)
EGFRCR SERPLBLD CKD-EPI 2021: 66 ML/MIN/1.73
EOSINOPHIL # BLD AUTO: 0.1 X10E3/UL (ref 0–0.4)
EOSINOPHIL NFR BLD AUTO: 2 %
ERYTHROCYTE [DISTWIDTH] IN BLOOD BY AUTOMATED COUNT: 14.2 % (ref 11.7–15.4)
GLOBULIN SER CALC-MCNC: 2.5 G/DL (ref 1.5–4.5)
GLUCOSE SERPL-MCNC: 99 MG/DL (ref 70–99)
HBA1C MFR BLD: 6.3 % (ref 4.8–5.6)
HCT VFR BLD AUTO: 38.7 % (ref 34–46.6)
HDLC SERPL-MCNC: 60 MG/DL
HGB BLD-MCNC: 12.5 G/DL (ref 11.1–15.9)
IMM GRANULOCYTES # BLD AUTO: 0 X10E3/UL (ref 0–0.1)
IMM GRANULOCYTES NFR BLD AUTO: 0 %
LDLC SERPL CALC-MCNC: 90 MG/DL (ref 0–99)
LYMPHOCYTES # BLD AUTO: 1.1 X10E3/UL (ref 0.7–3.1)
LYMPHOCYTES NFR BLD AUTO: 24 %
MCH RBC QN AUTO: 26.8 PG (ref 26.6–33)
MCHC RBC AUTO-ENTMCNC: 32.3 G/DL (ref 31.5–35.7)
MCV RBC AUTO: 83 FL (ref 79–97)
MICROALBUMIN UR-MCNC: 8.4 UG/ML
MONOCYTES # BLD AUTO: 0.4 X10E3/UL (ref 0.1–0.9)
MONOCYTES NFR BLD AUTO: 9 %
NEUTROPHILS # BLD AUTO: 2.9 X10E3/UL (ref 1.4–7)
NEUTROPHILS NFR BLD AUTO: 64 %
PLATELET # BLD AUTO: 272 X10E3/UL (ref 150–450)
POTASSIUM SERPL-SCNC: 4.4 MMOL/L (ref 3.5–5.2)
PROT SERPL-MCNC: 7 G/DL (ref 6–8.5)
RBC # BLD AUTO: 4.67 X10E6/UL (ref 3.77–5.28)
SODIUM SERPL-SCNC: 144 MMOL/L (ref 134–144)
TRIGL SERPL-MCNC: 89 MG/DL (ref 0–149)
URATE SERPL-MCNC: 4.4 MG/DL (ref 3.1–7.9)
VLDLC SERPL CALC-MCNC: 16 MG/DL (ref 5–40)
WBC # BLD AUTO: 4.6 X10E3/UL (ref 3.4–10.8)

## 2024-04-04 ENCOUNTER — TELEPHONE (OUTPATIENT)
Dept: FAMILY MEDICINE CLINIC | Facility: CLINIC | Age: 74
End: 2024-04-04
Payer: MEDICARE

## 2024-04-04 NOTE — TELEPHONE ENCOUNTER
Patient states Metformin was discussed at her recent appointment, but pharmacy has not received. Can we send this in for her? A1C 6.3

## 2024-04-05 RX ORDER — METFORMIN HYDROCHLORIDE 500 MG/1
500 TABLET, EXTENDED RELEASE ORAL
Qty: 30 TABLET | Refills: 5 | Status: SHIPPED | OUTPATIENT
Start: 2024-04-05

## 2024-05-17 ENCOUNTER — OFFICE VISIT (OUTPATIENT)
Dept: FAMILY MEDICINE CLINIC | Facility: CLINIC | Age: 74
End: 2024-05-17
Payer: MEDICARE

## 2024-05-17 VITALS
BODY MASS INDEX: 32.39 KG/M2 | OXYGEN SATURATION: 100 % | HEIGHT: 67 IN | WEIGHT: 206.4 LBS | DIASTOLIC BLOOD PRESSURE: 84 MMHG | TEMPERATURE: 98.2 F | SYSTOLIC BLOOD PRESSURE: 146 MMHG | HEART RATE: 94 BPM

## 2024-05-17 DIAGNOSIS — B02.9 HERPES ZOSTER WITHOUT COMPLICATION: Primary | ICD-10-CM

## 2024-05-17 PROCEDURE — 99213 OFFICE O/P EST LOW 20 MIN: CPT | Performed by: STUDENT IN AN ORGANIZED HEALTH CARE EDUCATION/TRAINING PROGRAM

## 2024-05-17 PROCEDURE — 3044F HG A1C LEVEL LT 7.0%: CPT | Performed by: STUDENT IN AN ORGANIZED HEALTH CARE EDUCATION/TRAINING PROGRAM

## 2024-05-17 PROCEDURE — 3077F SYST BP >= 140 MM HG: CPT | Performed by: STUDENT IN AN ORGANIZED HEALTH CARE EDUCATION/TRAINING PROGRAM

## 2024-05-17 PROCEDURE — 3079F DIAST BP 80-89 MM HG: CPT | Performed by: STUDENT IN AN ORGANIZED HEALTH CARE EDUCATION/TRAINING PROGRAM

## 2024-05-17 PROCEDURE — 1126F AMNT PAIN NOTED NONE PRSNT: CPT | Performed by: STUDENT IN AN ORGANIZED HEALTH CARE EDUCATION/TRAINING PROGRAM

## 2024-05-17 RX ORDER — ACYCLOVIR 800 MG/1
800 TABLET ORAL
Qty: 50 TABLET | Refills: 0 | Status: SHIPPED | OUTPATIENT
Start: 2024-05-17 | End: 2024-05-27

## 2024-05-17 RX ORDER — HYDROXYZINE HYDROCHLORIDE 25 MG/1
50 TABLET, FILM COATED ORAL NIGHTLY PRN
Qty: 60 TABLET | Refills: 1 | Status: SHIPPED | OUTPATIENT
Start: 2024-05-17 | End: 2024-07-16

## 2024-05-17 NOTE — PROGRESS NOTES
"Chief Complaint  Rash (Two days-burning/Right side to back/Itching/)    Subjective        Danielle Torres presents to [facility]  History of Present Illness  74-year-old -American female here with acute complaint of rash.      Pt c/o right lateral trunkal rash x 3 days. Pt reports son had a remote hx of shingles.    Instructed patient to get the adult preventive immunization that are due at  the pharmacy, including -shingles, RSV.  Wait for shingles shot for 30 days after resolution of sx.  Rash        Objective   Vital Signs:  /84 (BP Location: Left arm, Patient Position: Sitting, Cuff Size: Large Adult)   Pulse 94   Temp 98.2 °F (36.8 °C)   Ht 170.2 cm (67.01\")   Wt 93.6 kg (206 lb 6.4 oz)   SpO2 100%   BMI 32.32 kg/m²   Estimated body mass index is 32.32 kg/m² as calculated from the following:    Height as of this encounter: 170.2 cm (67.01\").    Weight as of this encounter: 93.6 kg (206 lb 6.4 oz).               Physical Exam  Constitutional:       Appearance: Normal appearance.   HENT:      Head: Normocephalic and atraumatic.   Eyes:      Conjunctiva/sclera: Conjunctivae normal.   Cardiovascular:      Rate and Rhythm: Normal rate and regular rhythm.      Heart sounds: Normal heart sounds.   Pulmonary:      Effort: Pulmonary effort is normal.      Breath sounds: Normal breath sounds.   Abdominal:      General: Bowel sounds are normal.      Palpations: Abdomen is soft.      Comments: Non-tender   Skin:     General: Skin is warm.      Comments: 2x 2 inches erythematous rash with yellow spots on it along right mid lateral trunk.  2x 3 inches  erythematous rash with yellow spots on it located in right flank area.     Neurological:      General: No focal deficit present.      Mental Status: She is alert and oriented to person, place, and time.   Psychiatric:         Mood and Affect: Mood normal.         Behavior: Behavior normal.        Result Review :    The following " data was reviewed by: JACKLYN Gerard on 05/17/2024:  CMP          6/5/2023    15:06 9/21/2023    14:54 4/2/2024    15:11   CMP   Glucose 102  99  99    BUN 15  15  14    Creatinine 0.86  0.87  0.92    Sodium 141  141  144    Potassium 3.8  4.2  4.4    Chloride 102  102  104    Calcium 9.2  9.3  9.8    Total Protein 7.2  7.0  7.0    Albumin 4.4  4.4  4.5    Globulin 2.8  2.6  2.5    Total Bilirubin 0.5  0.4  0.4    Alkaline Phosphatase 73  67  71    AST (SGOT) 25  23  21    ALT (SGPT) 35  32  27    BUN/Creatinine Ratio 17  17  15      CBC          6/5/2023    15:06 9/21/2023    14:54 4/2/2024    15:11   CBC   WBC 4.2  4.2  4.6    RBC 4.58  4.49  4.67    Hemoglobin 12.2  11.8  12.5    Hematocrit 37.2  37.0  38.7    MCV 81  82  83    MCH 26.6  26.3  26.8    MCHC 32.8  31.9  32.3    RDW 14.4  14.0  14.2    Platelets 279  270  272      Lipid Panel          6/5/2023    15:06 9/21/2023    14:54 4/2/2024    15:11   Lipid Panel   Total Cholesterol 158  178  166    Triglycerides 111  106  89    HDL Cholesterol 58  59  60    VLDL Cholesterol 20  19  16    LDL Cholesterol  80  100  90        A1C Last 3 Results          9/21/2023    14:54 4/2/2024    15:11   HGBA1C Last 3 Results   Hemoglobin A1C 6.5  6.3      Microalbumin          4/2/2024    15:11   Microalbumin   Microalbumin, Urine 8.4                     Assessment and Plan     Diagnoses and all orders for this visit:    1. Herpes zoster without complication (Primary)  Assessment & Plan:  Counseled patient she is contagious by skin to skin contact until the rash crusts or at least a week.  Also counseled patient to get the shingles vaccine 1 month from now after full resolution of current shingles, patient was understanding.  Also educated patient to keep the rash covered to avoid contact and spread.  Also counseled on handwashing.    Orders:  -     acyclovir (ZOVIRAX) 800 MG tablet; Take 1 tablet by mouth 5 (Five) Times a Day for 10 days.  Dispense: 50  tablet; Refill: 0  -     hydrOXYzine (ATARAX) 25 MG tablet; Take 2 tablets by mouth At Night As Needed for Itching, Allergies or Anxiety for up to 60 days.  Dispense: 60 tablet; Refill: 1      Instructed patient to Return to Office as needed for persistent or new symptoms and/or go to ER for worsening symptoms, patient voiced understanding.          Follow Up     No follow-ups on file.  Patient was given instructions and counseling regarding her condition or for health maintenance advice. Please see specific information pulled into the AVS if appropriate.

## 2024-05-17 NOTE — ASSESSMENT & PLAN NOTE
Counseled patient she is contagious by skin to skin contact until the rash crusts or at least a week.  Also counseled patient to get the shingles vaccine 1 month from now after full resolution of current shingles, patient was understanding.  Also educated patient to keep the rash covered to avoid contact and spread.  Also counseled on handwashing.

## 2024-07-30 ENCOUNTER — EXTERNAL PBMM DATA (OUTPATIENT)
Dept: PHARMACY | Facility: OTHER | Age: 74
End: 2024-07-30
Payer: MEDICARE

## 2024-08-04 DIAGNOSIS — E11.9 TYPE 2 DIABETES MELLITUS WITHOUT COMPLICATION, WITHOUT LONG-TERM CURRENT USE OF INSULIN: Chronic | ICD-10-CM

## 2024-08-05 RX ORDER — METFORMIN HYDROCHLORIDE 500 MG/1
500 TABLET, EXTENDED RELEASE ORAL
Qty: 30 TABLET | Refills: 0 | Status: SHIPPED | OUTPATIENT
Start: 2024-08-05 | End: 2024-08-12 | Stop reason: SDUPTHER

## 2024-08-12 ENCOUNTER — OFFICE VISIT (OUTPATIENT)
Dept: FAMILY MEDICINE CLINIC | Facility: CLINIC | Age: 74
End: 2024-08-12
Payer: MEDICARE

## 2024-08-12 VITALS
BODY MASS INDEX: 31.89 KG/M2 | SYSTOLIC BLOOD PRESSURE: 140 MMHG | OXYGEN SATURATION: 96 % | HEART RATE: 82 BPM | DIASTOLIC BLOOD PRESSURE: 86 MMHG | WEIGHT: 203.2 LBS | HEIGHT: 67 IN | TEMPERATURE: 97.3 F

## 2024-08-12 DIAGNOSIS — E78.00 PURE HYPERCHOLESTEROLEMIA: Chronic | ICD-10-CM

## 2024-08-12 DIAGNOSIS — Z86.010 HX OF COLONOSCOPY WITH POLYPECTOMY: ICD-10-CM

## 2024-08-12 DIAGNOSIS — Z98.890 HX OF COLONOSCOPY WITH POLYPECTOMY: ICD-10-CM

## 2024-08-12 DIAGNOSIS — E11.9 TYPE 2 DIABETES MELLITUS WITHOUT COMPLICATION, WITHOUT LONG-TERM CURRENT USE OF INSULIN: Chronic | ICD-10-CM

## 2024-08-12 DIAGNOSIS — M25.531 RIGHT WRIST PAIN: Primary | ICD-10-CM

## 2024-08-12 DIAGNOSIS — M1A.0410 IDIOPATHIC CHRONIC GOUT OF RIGHT HAND WITHOUT TOPHUS: Chronic | ICD-10-CM

## 2024-08-12 DIAGNOSIS — M79.631 RIGHT FOREARM PAIN: ICD-10-CM

## 2024-08-12 DIAGNOSIS — Z12.11 COLON CANCER SCREENING: ICD-10-CM

## 2024-08-12 DIAGNOSIS — I10 ESSENTIAL HYPERTENSION: Chronic | ICD-10-CM

## 2024-08-12 PROCEDURE — G0439 PPPS, SUBSEQ VISIT: HCPCS | Performed by: STUDENT IN AN ORGANIZED HEALTH CARE EDUCATION/TRAINING PROGRAM

## 2024-08-12 PROCEDURE — 3077F SYST BP >= 140 MM HG: CPT | Performed by: STUDENT IN AN ORGANIZED HEALTH CARE EDUCATION/TRAINING PROGRAM

## 2024-08-12 PROCEDURE — 3044F HG A1C LEVEL LT 7.0%: CPT | Performed by: STUDENT IN AN ORGANIZED HEALTH CARE EDUCATION/TRAINING PROGRAM

## 2024-08-12 PROCEDURE — 1126F AMNT PAIN NOTED NONE PRSNT: CPT | Performed by: STUDENT IN AN ORGANIZED HEALTH CARE EDUCATION/TRAINING PROGRAM

## 2024-08-12 PROCEDURE — 3079F DIAST BP 80-89 MM HG: CPT | Performed by: STUDENT IN AN ORGANIZED HEALTH CARE EDUCATION/TRAINING PROGRAM

## 2024-08-12 PROCEDURE — 1159F MED LIST DOCD IN RCRD: CPT | Performed by: STUDENT IN AN ORGANIZED HEALTH CARE EDUCATION/TRAINING PROGRAM

## 2024-08-12 PROCEDURE — 1170F FXNL STATUS ASSESSED: CPT | Performed by: STUDENT IN AN ORGANIZED HEALTH CARE EDUCATION/TRAINING PROGRAM

## 2024-08-12 PROCEDURE — 1160F RVW MEDS BY RX/DR IN RCRD: CPT | Performed by: STUDENT IN AN ORGANIZED HEALTH CARE EDUCATION/TRAINING PROGRAM

## 2024-08-12 PROCEDURE — 99214 OFFICE O/P EST MOD 30 MIN: CPT | Performed by: STUDENT IN AN ORGANIZED HEALTH CARE EDUCATION/TRAINING PROGRAM

## 2024-08-12 RX ORDER — ATORVASTATIN CALCIUM 10 MG/1
10 TABLET, FILM COATED ORAL
Qty: 90 TABLET | Refills: 1 | Status: SHIPPED | OUTPATIENT
Start: 2024-08-12

## 2024-08-12 RX ORDER — METFORMIN HYDROCHLORIDE 500 MG/1
500 TABLET, EXTENDED RELEASE ORAL
Qty: 90 TABLET | Refills: 1 | Status: SHIPPED | OUTPATIENT
Start: 2024-08-12

## 2024-08-12 RX ORDER — ASPIRIN 81 MG/1
81 TABLET ORAL DAILY
Qty: 90 TABLET | Refills: 3 | Status: SHIPPED | OUTPATIENT
Start: 2024-08-12

## 2024-08-12 RX ORDER — ALLOPURINOL 100 MG/1
100 TABLET ORAL DAILY
Qty: 90 TABLET | Refills: 0 | Status: SHIPPED | OUTPATIENT
Start: 2024-08-12

## 2024-08-12 RX ORDER — BISMUTH SUBSALICYLATE 262MG/15ML
1 SUSPENSION, ORAL (FINAL DOSE FORM) ORAL 2 TIMES DAILY
Qty: 100 EACH | Refills: 5 | Status: SHIPPED | OUTPATIENT
Start: 2024-08-12

## 2024-08-12 RX ORDER — AMLODIPINE BESYLATE 10 MG/1
10 TABLET ORAL DAILY
Qty: 90 TABLET | Refills: 1 | Status: SHIPPED | OUTPATIENT
Start: 2024-08-12

## 2024-08-12 NOTE — ASSESSMENT & PLAN NOTE
Lipid abnormalities are stable    Plan:  Continue same medication/s without change.      Discussed medication dosage, use, side effects, and goals of treatment in detail.    Counseled patient on lifestyle modifications to help control hyperlipidemia.   Discussed the importance of healthy diet, nutrition, and lifestyle. Recommend low salt, fat/cholesterol diet and avoid concentrated sweets. Encouraged DASH diet along with fresh fruits & vegetables and low fat dairy products. Counseled patient to exercise/walk as tolerated. Avoid tobacco and alcohol use.      Patient Treatment Goals:   LDL goal is less than 70    Followup in 6 months.

## 2024-08-12 NOTE — PROGRESS NOTES
Subjective   The ABCs of the Annual Wellness Visit  Medicare Wellness Visit      Danielle Torres is a 74 y.o. patient who presents for a Medicare Wellness Visit.    The following portions of the patient's history were reviewed and   updated as appropriate: allergies, current medications, past family history, past medical history, past social history, past surgical history, and problem list.    Compared to one year ago, the patient's physical   health is the same.  Compared to one year ago, the patient's mental   health is the same.    Recent Hospitalizations:  She was not admitted to the hospital during the last year.     Current Medical Providers:  Patient Care Team:  Hilario Huang APRN as PCP - General (Nurse Practitioner)  Amelie Diaz APRN as Nurse Practitioner (Family Medicine)  DOUGLAS Bajwa MD as Consulting Physician (Ophthalmology)    Outpatient Medications Prior to Visit   Medication Sig Dispense Refill    CHERRY PO Take  by mouth.      Coenzyme Q10 (CO Q 10 PO) Take 200 Int'l Units by mouth.      Misc Natural Products (YUMVS BEET ROOT-TART CHERRY PO) Take  by mouth.      vitamin B-12 (CYANOCOBALAMIN) 100 MCG tablet Take 0.5 tablets by mouth Daily.      allopurinol (ZYLOPRIM) 100 MG tablet Take 1 tablet by mouth Daily. 90 tablet 0    amLODIPine (NORVASC) 10 MG tablet Take 1 tablet by mouth Daily. 90 tablet 1    aspirin 81 MG EC tablet Take 1 tablet by mouth Daily. 90 tablet 3    atorvastatin (LIPITOR) 10 MG tablet Take 1 tablet by mouth every night at bedtime. 90 tablet 1    metFORMIN ER (GLUCOPHAGE-XR) 500 MG 24 hr tablet Take 1 tablet by mouth Daily With Breakfast. 30 tablet 0    glucose blood test strip Twice Daily (Patient not taking: Reported on 8/12/2024) 100 each 5    Lancets Ultra Thin 30G misc Inject 1 each under the skin into the appropriate area as directed 2 (Two) Times a Day. (Patient not taking: Reported on 8/12/2024) 100 each 5     No facility-administered medications prior  "to visit.     No opioid medication identified on active medication list. I have reviewed chart for other potential  high risk medication/s and harmful drug interactions in the elderly.      Aspirin is on active medication list. Aspirin use is indicated based on review of current medical condition/s. Pros and cons of this therapy have been discussed today. Benefits of this medication outweigh potential harm.  Patient has been encouraged to continue taking this medication.  .      Patient Active Problem List   Diagnosis    Essential hypertension    Hyperlipidemia    Allergic rhinitis    Idiopathic chronic gout    Class 1 obesity due to excess calories with serious comorbidity and body mass index (BMI) of 32.0 to 32.9 in adult    Intraocular hemorrhage of left eye    Post-menopausal    Type 2 diabetes mellitus without complication, without long-term current use of insulin    Hx of colonoscopy with polypectomy    Right wrist pain    Right forearm pain    Herpes zoster without complication    Colon cancer screening     Advance Care Planning Advance Directive is not on file.  ACP discussion was held with the patient during this visit. Patient does not have an advance directive, declines further assistance.            Objective   Vitals:    08/12/24 1332   BP: 140/86   Pulse: 82   Temp: 97.3 °F (36.3 °C)   TempSrc: Temporal   SpO2: 96%   Weight: 92.2 kg (203 lb 3.2 oz)   Height: 170.2 cm (67.01\")       Estimated body mass index is 31.82 kg/m² as calculated from the following:    Height as of this encounter: 170.2 cm (67.01\").    Weight as of this encounter: 92.2 kg (203 lb 3.2 oz).            Does the patient have evidence of cognitive impairment? No                                                                                                Health  Risk Assessment    Smoking Status:  Social History     Tobacco Use   Smoking Status Some Days    Current packs/day: 0.00    Types: Cigarettes    Last attempt to quit: " 2000    Years since quittin.2   Smokeless Tobacco Never     Alcohol Consumption:  Social History     Substance and Sexual Activity   Alcohol Use No       Fall Risk Screen  STEADI Fall Risk Assessment was completed, and patient is at LOW risk for falls.Assessment completed on:2024    Depression Screenin/12/2024     1:31 PM   PHQ-2/PHQ-9 Depression Screening   Little Interest or Pleasure in Doing Things 0-->not at all   Feeling Down, Depressed or Hopeless 1-->several days   PHQ-9: Brief Depression Severity Measure Score 1     Health Habits and Functional and Cognitive Screenin/12/2024     1:30 PM   Functional & Cognitive Status   Do you have difficulty preparing food and eating? No   Do you have difficulty bathing yourself, getting dressed or grooming yourself? No   Do you have difficulty using the toilet? No   Do you have difficulty moving around from place to place? No   Do you have trouble with steps or getting out of a bed or a chair? No   Current Diet Well Balanced Diet   Dental Exam Not up to date   Eye Exam Not up to date   Exercise (times per week) 1 times per week   Current Exercises Include Walking   Do you need help using the phone?  No   Are you deaf or do you have serious difficulty hearing?  No   Do you need help to go to places out of walking distance? No   Do you need help shopping? No   Do you need help preparing meals?  No   Do you need help with housework?  No   Do you need help with laundry? No   Do you need help taking your medications? No   Do you need help managing money? No   Do you ever drive or ride in a car without wearing a seat belt? No   Have you felt unusual stress, anger or loneliness in the last month? No   Who do you live with? Spouse   If you need help, do you have trouble finding someone available to you? No   Have you been bothered in the last four weeks by sexual problems? No   Do you have difficulty concentrating, remembering or making decisions?  No           Age-appropriate Screening Schedule:  Refer to the list below for future screening recommendations based on patient's age, sex and/or medical conditions. Orders for these recommended tests are listed in the plan section. The patient has been provided with a written plan.    Health Maintenance List  Health Maintenance   Topic Date Due    DXA SCAN  11/16/2023    INFLUENZA VACCINE  08/01/2024    ZOSTER VACCINE (2 of 2) 10/12/2024 (Originally 4/7/2017)    COVID-19 Vaccine (5 - 2023-24 season) 11/01/2024 (Originally 9/1/2023)    LIPID PANEL  04/02/2025    BMI FOLLOWUP  04/02/2025    ANNUAL WELLNESS VISIT  08/12/2025    MAMMOGRAM  11/28/2025    COLORECTAL CANCER SCREENING  12/27/2026    TDAP/TD VACCINES (3 - Td or Tdap) 08/11/2027    HEPATITIS C SCREENING  Completed    Pneumococcal Vaccine 65+  Completed    DIABETIC FOOT EXAM  Discontinued    PAP SMEAR  Discontinued    HEMOGLOBIN A1C  Discontinued    DIABETIC EYE EXAM  Discontinued    URINE MICROALBUMIN  Discontinued                                                                                                                                                CMS Preventative Services Quick Reference  Risk Factors Identified During Encounter  Fall Risk-High or Moderate: Information on Fall Prevention Shared in After Visit Summary  Immunizations Discussed/Encouraged: Tdap and Influenza  Dental Screening Recommended  Vision Screening Recommended    The above risks/problems have been discussed with the patient.  Pertinent information has been shared with the patient in the After Visit Summary.  An After Visit Summary and PPPS were made available to the patient.    Follow Up:   Next Medicare Wellness visit to be scheduled in 1 year.         Additional E&M Note during same encounter follows:  Patient has additional, significant, and separately identifiable condition(s)/problem(s) that require work above and beyond the Medicare Wellness Visit     Chief  "Complaint  Medicare Wellness-subsequent    Subjective   74-year-old -American female with a history of hypertension, hyperlipidemia, diabetes type 2 here for chronic disease management and annual Medicare wellness visit.      Pt states she has upcoming eye doc appt with Dr Viridiana MD soon.  Counseled patient on importance of annual diabetic eye exam and also because she has a history of hemorrhage in her eyes, patient was understanding.  Pt s/p Melvinat Jackelyn for right Upp ext pain now improved on brace qhs.  Pt reports she has always had negative PAP smears and does not want any new PAP smears.      Danielle is also being seen today for an annual adult preventative physical exam.                 Objective   Vital Signs:  /86   Pulse 82   Temp 97.3 °F (36.3 °C) (Temporal)   Ht 170.2 cm (67.01\")   Wt 92.2 kg (203 lb 3.2 oz)   SpO2 96%   BMI 31.82 kg/m²   Physical Exam  Constitutional:       Appearance: Normal appearance.   HENT:      Head: Normocephalic and atraumatic.   Eyes:      Conjunctiva/sclera: Conjunctivae normal.   Cardiovascular:      Rate and Rhythm: Normal rate and regular rhythm.      Heart sounds: Normal heart sounds.   Pulmonary:      Effort: Pulmonary effort is normal.      Breath sounds: Normal breath sounds.   Abdominal:      General: Bowel sounds are normal.      Palpations: Abdomen is soft.      Comments: Non-tender   Skin:     General: Skin is warm.   Neurological:      General: No focal deficit present.      Mental Status: She is alert and oriented to person, place, and time.   Psychiatric:         Mood and Affect: Mood normal.         Behavior: Behavior normal.         The following data was reviewed by: JACKLYN Gerard on 08/12/2024:        Assessment and Plan Additional age appropriate preventative wellness advice topics were discussed during today's preventative wellness exam(some topics already addressed during AWV portion of the note above):    Physical " Activity: Advised cardiovascular activity 150 minutes per week as tolerated. (example brisk walk for 30 minutes, 5 days a week).              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  Dietary sodium restriction.  Weight loss.  Ambulatory blood pressure monitoring.  Blood pressure will be reassessed in 3 months.  Discussed the importance of healthy diet, nutrition, and lifestyle. Recommend low salt, fat/cholesterol diet and avoid concentrated sweets. Encouraged DASH diet along with fresh fruits & vegetables and low fat dairy products. Counseled patient to exercise/walk as tolerated. Avoid tobacco and alcohol use.    Pure hypercholesterolemia   Lipid abnormalities are stable    Plan:  Continue same medication/s without change.      Discussed medication dosage, use, side effects, and goals of treatment in detail.    Counseled patient on lifestyle modifications to help control hyperlipidemia.   Discussed the importance of healthy diet, nutrition, and lifestyle. Recommend low salt, fat/cholesterol diet and avoid concentrated sweets. Encouraged DASH diet along with fresh fruits & vegetables and low fat dairy products. Counseled patient to exercise/walk as tolerated. Avoid tobacco and alcohol use.      Patient Treatment Goals:   LDL goal is less than 70    Followup in 6 months.  Type 2 diabetes mellitus without complication, without long-term current use of insulin  Diabetes is improving with treatment.   Continue current treatment regimen.  Reminded to bring in blood sugar diary at next visit.  Recommended an ADA diet.  Recommended a Mediterranean style of eating  Regular aerobic exercise.  Discussed ways to avoid symptomatic hypoglycemia.  Discussed sick day management.  Discussed foot care.  Reminded to get yearly retinal exam.  Ophthalmology/Optometry referral.  Diabetes will be reassessed in 3  months.  Discussed diabetes management and need for adequate BS control. Educated patient high A1c puts him/her at risk for MI, CVA, CKD, gastroparesis, foot ulcers, and frequent infections.   Pt informed of Rx for glucometer/test strips, and lancets as needed and explained to keep a blood sugar log. Return to office as instructed. Additionally diet compliance explained - avoid sugar candy, soda, high carbohydrate loads. Explained how losing weight can improve your health and achieve better blood sugar control. Being active can also help prevent or control the disorder. Recommended annual opthalmology follow -up due to diagnosis of diabetes. Annual Podiatry visit prn.    Right wrist pain  Resolved  Right forearm pain  Resolved  Colon cancer screening  Patient agreeable to colonoscopy.  Hx of colonoscopy with polypectomy      Orders Placed This Encounter   Procedures    MicroAlbumin, Urine, Random - Urine, Clean Catch     Order Specific Question:   Release to patient     Answer:   Routine Release [[id number]]    Hemoglobin A1c     Order Specific Question:   Release to patient     Answer:   Routine Release [[id number]]    Ambulatory Referral to Gastroenterology     Referral Priority:   Routine     Referral Type:   Consultation     Referral Reason:   Specialty Services Required     Requested Specialty:   Gastroenterology     Number of Visits Requested:   1     New Medications Ordered This Visit   Medications    allopurinol (ZYLOPRIM) 100 MG tablet     Sig: Take 1 tablet by mouth Daily.     Dispense:  90 tablet     Refill:  0    amLODIPine (NORVASC) 10 MG tablet     Sig: Take 1 tablet by mouth Daily.     Dispense:  90 tablet     Refill:  1    aspirin 81 MG EC tablet     Sig: Take 1 tablet by mouth Daily.     Dispense:  90 tablet     Refill:  3    atorvastatin (LIPITOR) 10 MG tablet     Sig: Take 1 tablet by mouth every night at bedtime.     Dispense:  90 tablet     Refill:  1    glucose blood test strip     Sig: Twice  Daily     Dispense:  100 each     Refill:  5     Dx- DM-2  Dispense glucometer and testing supplies as covered by patient insurance formulary. Thanks.    Lancets Ultra Thin 30G misc     Sig: Inject 1 each under the skin into the appropriate area as directed 2 (Two) Times a Day.     Dispense:  100 each     Refill:  5     DX- DM-2.    Dispense glucometer and testing supplies as covered by patient insurance formulary. Thanks.    metFORMIN ER (GLUCOPHAGE-XR) 500 MG 24 hr tablet     Sig: Take 1 tablet by mouth Daily With Breakfast.     Dispense:  90 tablet     Refill:  1          Follow Up   Return in about 3 months (around 11/12/2024) for Next scheduled follow up.  Patient was given instructions and counseling regarding her condition or for health maintenance advice. Please see specific information pulled into the AVS if appropriate.

## 2024-08-12 NOTE — ASSESSMENT & PLAN NOTE
Diabetes is improving with treatment.   Continue current treatment regimen.  Reminded to bring in blood sugar diary at next visit.  Recommended an ADA diet.  Recommended a Mediterranean style of eating  Regular aerobic exercise.  Discussed ways to avoid symptomatic hypoglycemia.  Discussed sick day management.  Discussed foot care.  Reminded to get yearly retinal exam.  Ophthalmology/Optometry referral.  Diabetes will be reassessed in 3 months.  Discussed diabetes management and need for adequate BS control. Educated patient high A1c puts him/her at risk for MI, CVA, CKD, gastroparesis, foot ulcers, and frequent infections.   Pt informed of Rx for glucometer/test strips, and lancets as needed and explained to keep a blood sugar log. Return to office as instructed. Additionally diet compliance explained - avoid sugar candy, soda, high carbohydrate loads. Explained how losing weight can improve your health and achieve better blood sugar control. Being active can also help prevent or control the disorder. Recommended annual opthalmology follow -up due to diagnosis of diabetes. Annual Podiatry visit prn.

## 2024-08-13 LAB
HBA1C MFR BLD: 6.3 % (ref 4.8–5.6)
MICROALBUMIN UR-MCNC: <3 UG/ML

## 2024-10-14 ENCOUNTER — TRANSCRIBE ORDERS (OUTPATIENT)
Dept: ADMINISTRATIVE | Facility: HOSPITAL | Age: 74
End: 2024-10-14
Payer: MEDICARE

## 2024-10-14 ENCOUNTER — CLINICAL SUPPORT (OUTPATIENT)
Dept: FAMILY MEDICINE CLINIC | Facility: CLINIC | Age: 74
End: 2024-10-14
Payer: MEDICARE

## 2024-10-14 DIAGNOSIS — Z12.31 BREAST CANCER SCREENING BY MAMMOGRAM: Primary | ICD-10-CM

## 2024-10-14 DIAGNOSIS — Z23 INFLUENZA VACCINE NEEDED: Primary | ICD-10-CM

## 2024-10-14 DIAGNOSIS — Z23 IMMUNIZATION DUE: Primary | ICD-10-CM

## 2024-10-14 PROCEDURE — 90662 IIV NO PRSV INCREASED AG IM: CPT | Performed by: STUDENT IN AN ORGANIZED HEALTH CARE EDUCATION/TRAINING PROGRAM

## 2024-10-14 PROCEDURE — G0008 ADMIN INFLUENZA VIRUS VAC: HCPCS | Performed by: STUDENT IN AN ORGANIZED HEALTH CARE EDUCATION/TRAINING PROGRAM

## 2024-11-12 ENCOUNTER — OFFICE VISIT (OUTPATIENT)
Dept: FAMILY MEDICINE CLINIC | Facility: CLINIC | Age: 74
End: 2024-11-12
Payer: MEDICARE

## 2024-11-12 VITALS
HEIGHT: 67 IN | OXYGEN SATURATION: 95 % | HEART RATE: 76 BPM | BODY MASS INDEX: 32.49 KG/M2 | DIASTOLIC BLOOD PRESSURE: 72 MMHG | TEMPERATURE: 98.7 F | WEIGHT: 207 LBS | SYSTOLIC BLOOD PRESSURE: 132 MMHG

## 2024-11-12 DIAGNOSIS — E78.00 PURE HYPERCHOLESTEROLEMIA: Chronic | ICD-10-CM

## 2024-11-12 DIAGNOSIS — Z86.0100 HX OF COLONOSCOPY WITH POLYPECTOMY: ICD-10-CM

## 2024-11-12 DIAGNOSIS — Z98.890 HX OF COLONOSCOPY WITH POLYPECTOMY: ICD-10-CM

## 2024-11-12 DIAGNOSIS — E11.9 TYPE 2 DIABETES MELLITUS WITHOUT COMPLICATION, WITHOUT LONG-TERM CURRENT USE OF INSULIN: Primary | Chronic | ICD-10-CM

## 2024-11-12 DIAGNOSIS — I10 ESSENTIAL HYPERTENSION: Chronic | ICD-10-CM

## 2024-11-12 DIAGNOSIS — M1A.0410 IDIOPATHIC CHRONIC GOUT OF RIGHT HAND WITHOUT TOPHUS: Chronic | ICD-10-CM

## 2024-11-12 PROBLEM — M25.531 RIGHT WRIST PAIN: Status: RESOLVED | Noted: 2024-04-02 | Resolved: 2024-11-12

## 2024-11-12 PROBLEM — M79.631 RIGHT FOREARM PAIN: Status: RESOLVED | Noted: 2024-04-02 | Resolved: 2024-11-12

## 2024-11-12 PROBLEM — H44.812: Status: RESOLVED | Noted: 2022-06-07 | Resolved: 2024-11-12

## 2024-11-12 PROBLEM — Z12.11 COLON CANCER SCREENING: Status: RESOLVED | Noted: 2024-08-12 | Resolved: 2024-11-12

## 2024-11-12 PROCEDURE — 1126F AMNT PAIN NOTED NONE PRSNT: CPT | Performed by: STUDENT IN AN ORGANIZED HEALTH CARE EDUCATION/TRAINING PROGRAM

## 2024-11-12 PROCEDURE — 3078F DIAST BP <80 MM HG: CPT | Performed by: STUDENT IN AN ORGANIZED HEALTH CARE EDUCATION/TRAINING PROGRAM

## 2024-11-12 PROCEDURE — 3044F HG A1C LEVEL LT 7.0%: CPT | Performed by: STUDENT IN AN ORGANIZED HEALTH CARE EDUCATION/TRAINING PROGRAM

## 2024-11-12 PROCEDURE — 3075F SYST BP GE 130 - 139MM HG: CPT | Performed by: STUDENT IN AN ORGANIZED HEALTH CARE EDUCATION/TRAINING PROGRAM

## 2024-11-12 PROCEDURE — 99214 OFFICE O/P EST MOD 30 MIN: CPT | Performed by: STUDENT IN AN ORGANIZED HEALTH CARE EDUCATION/TRAINING PROGRAM

## 2024-11-12 PROCEDURE — 1160F RVW MEDS BY RX/DR IN RCRD: CPT | Performed by: STUDENT IN AN ORGANIZED HEALTH CARE EDUCATION/TRAINING PROGRAM

## 2024-11-12 PROCEDURE — 1159F MED LIST DOCD IN RCRD: CPT | Performed by: STUDENT IN AN ORGANIZED HEALTH CARE EDUCATION/TRAINING PROGRAM

## 2024-11-12 RX ORDER — ALLOPURINOL 100 MG/1
100 TABLET ORAL DAILY
Qty: 90 TABLET | Refills: 0 | Status: SHIPPED | OUTPATIENT
Start: 2024-11-12

## 2024-11-12 RX ORDER — ATORVASTATIN CALCIUM 10 MG/1
10 TABLET, FILM COATED ORAL
Qty: 90 TABLET | Refills: 1 | Status: SHIPPED | OUTPATIENT
Start: 2024-11-12

## 2024-11-12 RX ORDER — ASPIRIN 81 MG/1
81 TABLET ORAL DAILY
Qty: 90 TABLET | Refills: 3 | Status: SHIPPED | OUTPATIENT
Start: 2024-11-12

## 2024-11-12 RX ORDER — BISMUTH SUBSALICYLATE 262MG/15ML
1 SUSPENSION, ORAL (FINAL DOSE FORM) ORAL 2 TIMES DAILY
Qty: 100 EACH | Refills: 5 | Status: SHIPPED | OUTPATIENT
Start: 2024-11-12

## 2024-11-12 RX ORDER — AMLODIPINE BESYLATE 10 MG/1
10 TABLET ORAL DAILY
Qty: 90 TABLET | Refills: 1 | Status: SHIPPED | OUTPATIENT
Start: 2024-11-12

## 2024-11-12 NOTE — ASSESSMENT & PLAN NOTE
Hypertension is stable and controlled  Continue current treatment regimen.  Dietary sodium restriction.  Weight loss.  Ambulatory blood pressure monitoring.  Blood pressure will be reassessed in 3 months.    Discussed the importance of healthy diet, nutrition, and lifestyle. Recommend low salt, fat/cholesterol diet and avoid concentrated sweets. Encouraged DASH diet along with fresh fruits & vegetables and low fat dairy products. Counseled patient to exercise/walk as tolerated. Avoid tobacco and alcohol use.

## 2024-11-12 NOTE — PROGRESS NOTES
"Chief Complaint  Follow-up (Pt is here for 3 month follow up )    Subjective        Danielle Torres presents to [facility]  History of Present Illness  74-year-old male here for chronic disease management follow-up visit.    Reports he is doing well in general.  Patient complaining of diarrhea on metformin.    Discussed A1c of 6.3 and patient would like to maintain the A1c without any medications.      Instructed patient to contact his cardiology office for preop cardiac clearance, patient voiced understanding.  Patient reports he is doing well in general.      Instructed patient to get the adult preventive immunization that are due at  the pharmacy, including - shingles and COVID booster.    Follow-up      Objective   Vital Signs:  /72 (BP Location: Left arm, Patient Position: Sitting, Cuff Size: Adult)   Pulse 76   Temp 98.7 °F (37.1 °C) (Temporal)   Ht 170.3 cm (67.06\")   Wt 93.9 kg (207 lb)   SpO2 95%   BMI 32.37 kg/m²   Estimated body mass index is 32.37 kg/m² as calculated from the following:    Height as of this encounter: 170.3 cm (67.06\").    Weight as of this encounter: 93.9 kg (207 lb).               Physical Exam  Constitutional:       Appearance: Normal appearance.   HENT:      Head: Normocephalic and atraumatic.   Eyes:      Conjunctiva/sclera: Conjunctivae normal.   Cardiovascular:      Rate and Rhythm: Normal rate and regular rhythm.      Heart sounds: Normal heart sounds.   Pulmonary:      Effort: Pulmonary effort is normal.      Breath sounds: Normal breath sounds.   Abdominal:      General: Bowel sounds are normal.      Palpations: Abdomen is soft.      Comments: Non-tender   Skin:     General: Skin is warm.   Neurological:      General: No focal deficit present.      Mental Status: She is alert and oriented to person, place, and time.   Psychiatric:         Mood and Affect: Mood normal.         Behavior: Behavior normal.        Result Review :    The " following data was reviewed by: JACKLYN Gerard on 11/12/2024:  CMP          4/2/2024    15:11   CMP   Glucose 99    BUN 14    Creatinine 0.92    Sodium 144    Potassium 4.4    Chloride 104    Calcium 9.8    Total Protein 7.0    Albumin 4.5    Globulin 2.5    Total Bilirubin 0.4    Alkaline Phosphatase 71    AST (SGOT) 21    ALT (SGPT) 27    BUN/Creatinine Ratio 15      CBC          4/2/2024    15:11   CBC   WBC 4.6    RBC 4.67    Hemoglobin 12.5    Hematocrit 38.7    MCV 83    MCH 26.8    MCHC 32.3    RDW 14.2    Platelets 272      Lipid Panel          4/2/2024    15:11   Lipid Panel   Total Cholesterol 166    Triglycerides 89    HDL Cholesterol 60    VLDL Cholesterol 16    LDL Cholesterol  90        A1C Last 3 Results          4/2/2024    15:11 8/12/2024    14:37   HGBA1C Last 3 Results   Hemoglobin A1C 6.3  6.3      Microalbumin          4/2/2024    15:11 8/12/2024    14:37   Microalbumin   Microalbumin, Urine 8.4  <3.0                     Assessment and Plan     Diagnoses and all orders for this visit:    1. Type 2 diabetes mellitus without complication, without long-term current use of insulin (Primary)  Assessment & Plan:  Stable.  Stop metformin today for complaint of side effect of diarrhea.  Will monitor A1c and try an alternative based on lab results.    Discussed the importance of healthy diet, nutrition, and lifestyle. Recommend low salt, fat/cholesterol diet and avoid concentrated sweets. Encouraged DASH diet along with fresh fruits & vegetables and low fat dairy products. Counseled patient to exercise/walk as tolerated. Avoid tobacco and alcohol use.      Instructed patient to follow-up with an eye doctor, either opthalmology or optometry with preference for opthalmology, for an annual diabetic eye exam.  Also educated and counseled patient to examine his feet and legs daily for any inconsistencies in his skin or skin tears.      Orders:  -     glucose blood test strip; Twice Daily   Dispense: 100 each; Refill: 5  -     Lancets Ultra Thin 30G misc; Inject 1 each under the skin into the appropriate area as directed 2 (Two) Times a Day.  Dispense: 100 each; Refill: 5  -     Hemoglobin A1c    2. 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  Dispense: 90 tablet; Refill: 0    3. Essential hypertension  Assessment & Plan:  Hypertension is stable and controlled  Continue current treatment regimen.  Dietary sodium restriction.  Weight loss.  Ambulatory blood pressure monitoring.  Blood pressure will be reassessed in 3 months.    Discussed the importance of healthy diet, nutrition, and lifestyle. Recommend low salt, fat/cholesterol diet and avoid concentrated sweets. Encouraged DASH diet along with fresh fruits & vegetables and low fat dairy products. Counseled patient to exercise/walk as tolerated. Avoid tobacco and alcohol use.      Orders:  -     amLODIPine (NORVASC) 10 MG tablet; Take 1 tablet by mouth Daily.  Dispense: 90 tablet; Refill: 1    4. Pure hypercholesterolemia  Assessment & Plan:   Lipid abnormalities are stable    Plan:  Continue same medication/s without change.      Discussed medication dosage, use, side effects, and goals of treatment in detail.    Counseled patient on lifestyle modifications to help control hyperlipidemia.     Patient Treatment Goals:   LDL goal is less than 70    Followup in 6 months.  Discussed the importance of healthy diet, nutrition, and lifestyle. Recommend low salt, fat/cholesterol diet and avoid concentrated sweets. Encouraged DASH diet along with fresh fruits & vegetables and low fat dairy products. Counseled patient to exercise/walk as tolerated. Avoid tobacco and alcohol use.      Orders:  -     atorvastatin (LIPITOR) 10 MG tablet; Take 1 tablet by mouth every night at bedtime.  Dispense: 90 tablet; Refill: 1    5. Hx of colonoscopy  with polypectomy  -     Ambulatory Referral to Gastroenterology    Other orders  -     aspirin 81 MG EC tablet; Take 1 tablet by mouth Daily.  Dispense: 90 tablet; Refill: 3          All chronic conditions have been addressed and treated by the practice or other specialists. Medications have been reconciled and refilled as appropriate. Reiterated compliance and timely follow up appointments. Side effects of all new and old medications reviewed with the patient and patient willing to accept all risks involved. Advised RTO if no improvement or worsening of symptoms or if any new complaints arise. Patient advised to follow up with clinic or call after diagnostic tests, if patient does not hear from office 3 days after the test completion.          Follow Up     Return in about 3 months (around 2/12/2025) for Next scheduled follow up.  Patient was given instructions and counseling regarding her condition or for health maintenance advice. Please see specific information pulled into the AVS if appropriate.

## 2024-11-12 NOTE — ASSESSMENT & PLAN NOTE
Stable.  Stop metformin today for complaint of side effect of diarrhea.  Will monitor A1c and try an alternative based on lab results.    Discussed the importance of healthy diet, nutrition, and lifestyle. Recommend low salt, fat/cholesterol diet and avoid concentrated sweets. Encouraged DASH diet along with fresh fruits & vegetables and low fat dairy products. Counseled patient to exercise/walk as tolerated. Avoid tobacco and alcohol use.      Instructed patient to follow-up with an eye doctor, either opthalmology or optometry with preference for opthalmology, for an annual diabetic eye exam.  Also educated and counseled patient to examine his feet and legs daily for any inconsistencies in his skin or skin tears.

## 2024-12-06 ENCOUNTER — HOSPITAL ENCOUNTER (OUTPATIENT)
Dept: MAMMOGRAPHY | Facility: HOSPITAL | Age: 74
Discharge: HOME | End: 2024-12-06
Admitting: STUDENT IN AN ORGANIZED HEALTH CARE EDUCATION/TRAINING PROGRAM
Payer: MEDICARE

## 2024-12-06 DIAGNOSIS — Z12.31 BREAST CANCER SCREENING BY MAMMOGRAM: ICD-10-CM

## 2024-12-06 PROCEDURE — 77063 BREAST TOMOSYNTHESIS BI: CPT

## 2024-12-06 PROCEDURE — 77067 SCR MAMMO BI INCL CAD: CPT

## 2025-01-31 ENCOUNTER — OFFICE VISIT (OUTPATIENT)
Dept: FAMILY MEDICINE CLINIC | Facility: CLINIC | Age: 75
End: 2025-01-31
Payer: MEDICARE

## 2025-01-31 VITALS
BODY MASS INDEX: 32.46 KG/M2 | WEIGHT: 206.8 LBS | TEMPERATURE: 97.8 F | OXYGEN SATURATION: 96 % | HEIGHT: 67 IN | RESPIRATION RATE: 16 BRPM | DIASTOLIC BLOOD PRESSURE: 82 MMHG | HEART RATE: 91 BPM | SYSTOLIC BLOOD PRESSURE: 142 MMHG

## 2025-01-31 DIAGNOSIS — J06.9 UPPER RESPIRATORY TRACT INFECTION, UNSPECIFIED TYPE: ICD-10-CM

## 2025-01-31 DIAGNOSIS — R05.1 ACUTE COUGH: Primary | ICD-10-CM

## 2025-01-31 DIAGNOSIS — J30.2 SEASONAL ALLERGIC RHINITIS, UNSPECIFIED TRIGGER: ICD-10-CM

## 2025-01-31 PROCEDURE — 3079F DIAST BP 80-89 MM HG: CPT | Performed by: STUDENT IN AN ORGANIZED HEALTH CARE EDUCATION/TRAINING PROGRAM

## 2025-01-31 PROCEDURE — 1126F AMNT PAIN NOTED NONE PRSNT: CPT | Performed by: STUDENT IN AN ORGANIZED HEALTH CARE EDUCATION/TRAINING PROGRAM

## 2025-01-31 PROCEDURE — 99213 OFFICE O/P EST LOW 20 MIN: CPT | Performed by: STUDENT IN AN ORGANIZED HEALTH CARE EDUCATION/TRAINING PROGRAM

## 2025-01-31 PROCEDURE — 1160F RVW MEDS BY RX/DR IN RCRD: CPT | Performed by: STUDENT IN AN ORGANIZED HEALTH CARE EDUCATION/TRAINING PROGRAM

## 2025-01-31 PROCEDURE — 87428 SARSCOV & INF VIR A&B AG IA: CPT | Performed by: STUDENT IN AN ORGANIZED HEALTH CARE EDUCATION/TRAINING PROGRAM

## 2025-01-31 PROCEDURE — 1159F MED LIST DOCD IN RCRD: CPT | Performed by: STUDENT IN AN ORGANIZED HEALTH CARE EDUCATION/TRAINING PROGRAM

## 2025-01-31 PROCEDURE — 3077F SYST BP >= 140 MM HG: CPT | Performed by: STUDENT IN AN ORGANIZED HEALTH CARE EDUCATION/TRAINING PROGRAM

## 2025-01-31 RX ORDER — FLUTICASONE PROPIONATE 50 MCG
2 SPRAY, SUSPENSION (ML) NASAL DAILY
Qty: 16 G | Refills: 11 | Status: SHIPPED | OUTPATIENT
Start: 2025-01-31 | End: 2025-02-07

## 2025-01-31 RX ORDER — AMOXICILLIN AND CLAVULANATE POTASSIUM 500; 125 MG/1; MG/1
1 TABLET, FILM COATED ORAL 3 TIMES DAILY
Qty: 30 TABLET | Refills: 0 | Status: SHIPPED | OUTPATIENT
Start: 2025-01-31

## 2025-01-31 RX ORDER — GUAIFENESIN 600 MG/1
1200 TABLET, EXTENDED RELEASE ORAL 2 TIMES DAILY
Qty: 20 TABLET | Refills: 0 | Status: SHIPPED | OUTPATIENT
Start: 2025-01-31 | End: 2025-02-10

## 2025-01-31 RX ORDER — PREDNISONE 20 MG/1
20 TABLET ORAL DAILY
Qty: 5 TABLET | Refills: 0 | Status: SHIPPED | OUTPATIENT
Start: 2025-01-31 | End: 2025-02-05

## 2025-01-31 RX ORDER — CETIRIZINE HYDROCHLORIDE 10 MG/1
10 TABLET ORAL NIGHTLY
Qty: 30 TABLET | Refills: 5 | Status: SHIPPED | OUTPATIENT
Start: 2025-01-31

## 2025-01-31 NOTE — PROGRESS NOTES
"Chief Complaint  Cough (Productive but no blood.  Ongoing for 2 days.  Denies F/C)    Subjective        Danielle Torres presents to [facility]  History of Present Illness  74-year-old female here for complaint of acute cough that started 2 days ago.  Cough  This is a new problem. The current episode started in the past 7 days. The cough is Productive of sputum. Associated symptoms include headaches, nasal congestion, postnasal drip and rhinorrhea. Pertinent negatives include no chest pain, chills, ear congestion, ear pain, fever, hemoptysis, myalgias, rash, sore throat, shortness of breath or wheezing. She has tried OTC cough suppressant for the symptoms. The treatment provided mild relief. Her past medical history is significant for environmental allergies.       Objective   Vital Signs:  /82 (BP Location: Left arm, Patient Position: Sitting, Cuff Size: Adult)   Pulse 91   Temp 97.8 °F (36.6 °C) (Temporal)   Resp 16   Ht 170.3 cm (67.05\")   Wt 93.8 kg (206 lb 12.8 oz)   SpO2 96%   BMI 32.34 kg/m²   Estimated body mass index is 32.34 kg/m² as calculated from the following:    Height as of this encounter: 170.3 cm (67.05\").    Weight as of this encounter: 93.8 kg (206 lb 12.8 oz).               Physical Exam  Constitutional:       Appearance: Normal appearance.   HENT:      Head: Normocephalic and atraumatic.      Nose: Congestion present. No rhinorrhea.      Right Sinus: Maxillary sinus tenderness and frontal sinus tenderness present.      Left Sinus: Maxillary sinus tenderness and frontal sinus tenderness present.      Mouth/Throat:      Pharynx: No oropharyngeal exudate or posterior oropharyngeal erythema.   Eyes:      Conjunctiva/sclera: Conjunctivae normal.   Cardiovascular:      Rate and Rhythm: Normal rate and regular rhythm.      Heart sounds: Normal heart sounds.   Pulmonary:      Effort: Pulmonary effort is normal. No respiratory distress.      Breath sounds: Normal " breath sounds. No stridor. No wheezing, rhonchi or rales.   Abdominal:      General: Bowel sounds are normal.      Palpations: Abdomen is soft.      Comments: Non-tender   Lymphadenopathy:      Cervical: No cervical adenopathy.   Skin:     General: Skin is warm.   Neurological:      General: No focal deficit present.      Mental Status: She is alert and oriented to person, place, and time.   Psychiatric:         Mood and Affect: Mood normal.         Behavior: Behavior normal.        Result Review :    The following data was reviewed by: JACKLYN Gerard on 01/31/2025:  CMP          4/2/2024    15:11   CMP   Glucose 99    BUN 14    Creatinine 0.92    Sodium 144    Potassium 4.4    Chloride 104    Calcium 9.8    Total Protein 7.0    Albumin 4.5    Globulin 2.5    Total Bilirubin 0.4    Alkaline Phosphatase 71    AST (SGOT) 21    ALT (SGPT) 27    BUN/Creatinine Ratio 15      CBC          4/2/2024    15:11   CBC   WBC 4.6    RBC 4.67    Hemoglobin 12.5    Hematocrit 38.7    MCV 83    MCH 26.8    MCHC 32.3    RDW 14.2    Platelets 272      Lipid Panel          4/2/2024    15:11   Lipid Panel   Total Cholesterol 166    Triglycerides 89    HDL Cholesterol 60    VLDL Cholesterol 16    LDL Cholesterol  90        A1C Last 3 Results          4/2/2024    15:11 8/12/2024    14:37   HGBA1C Last 3 Results   Hemoglobin A1C 6.3  6.3      Microalbumin          4/2/2024    15:11 8/12/2024    14:37   Microalbumin   Microalbumin, Urine 8.4  <3.0                     Assessment and Plan     Diagnoses and all orders for this visit:    1. Acute cough (Primary)  -     POCT SARS-CoV-2 + Flu Antigen LUCAS  -     guaiFENesin (Mucinex) 600 MG 12 hr tablet; Take 2 tablets by mouth 2 (Two) Times a Day for 10 days. Prn congestion.  Dispense: 20 tablet; Refill: 0    2. Upper respiratory tract infection, unspecified type  -     predniSONE (DELTASONE) 20 MG tablet; Take 1 tablet by mouth Daily for 5 days. Avoid NSAIDS while taking steroids.   Dispense: 5 tablet; Refill: 0  -     amoxicillin-clavulanate (Augmentin) 500-125 MG per tablet; Take 1 tablet by mouth 3 (Three) Times a Day.  Dispense: 30 tablet; Refill: 0    3. Seasonal allergic rhinitis, unspecified trigger  -     fluticasone (FLONASE) 50 MCG/ACT nasal spray; Administer 2 sprays into the nostril(s) as directed by provider Daily.  Dispense: 16 g; Refill: 11  -     cetirizine (zyrTEC) 10 MG tablet; Take 1 tablet by mouth Every Night.  Dispense: 30 tablet; Refill: 5      Instructed patient to Return to Clinic as needed for persistent or new symptoms and/or go to ER for worsening symptoms, patient voiced understanding.          Follow Up     Return in about 3 months (around 4/30/2025) for Next scheduled follow up.  Patient was given instructions and counseling regarding her condition or for health maintenance advice. Please see specific information pulled into the AVS if appropriate.

## 2025-02-03 LAB
EXPIRATION DATE: NORMAL
FLUAV AG UPPER RESP QL IA.RAPID: NOT DETECTED
FLUBV AG UPPER RESP QL IA.RAPID: NOT DETECTED
INTERNAL CONTROL: NORMAL
Lab: NORMAL
SARS-COV-2 AG UPPER RESP QL IA.RAPID: NOT DETECTED

## 2025-02-07 ENCOUNTER — TELEPHONE (OUTPATIENT)
Dept: FAMILY MEDICINE CLINIC | Facility: CLINIC | Age: 75
End: 2025-02-07
Payer: MEDICARE

## 2025-02-07 ENCOUNTER — HOSPITAL ENCOUNTER (EMERGENCY)
Facility: HOSPITAL | Age: 75
Discharge: HOME | End: 2025-02-07
Attending: EMERGENCY MEDICINE
Payer: MEDICARE

## 2025-02-07 ENCOUNTER — APPOINTMENT (OUTPATIENT)
Dept: GENERAL RADIOLOGY | Facility: HOSPITAL | Age: 75
End: 2025-02-07
Payer: MEDICARE

## 2025-02-07 VITALS
DIASTOLIC BLOOD PRESSURE: 84 MMHG | HEART RATE: 86 BPM | SYSTOLIC BLOOD PRESSURE: 145 MMHG | RESPIRATION RATE: 16 BRPM | TEMPERATURE: 97 F | OXYGEN SATURATION: 100 %

## 2025-02-07 DIAGNOSIS — J30.2 SEASONAL ALLERGIC RHINITIS, UNSPECIFIED TRIGGER: ICD-10-CM

## 2025-02-07 DIAGNOSIS — J18.9 PNEUMONIA OF RIGHT LOWER LOBE DUE TO INFECTIOUS ORGANISM: Primary | ICD-10-CM

## 2025-02-07 DIAGNOSIS — H65.91 RIGHT OTITIS MEDIA WITH EFFUSION: ICD-10-CM

## 2025-02-07 PROCEDURE — 71045 X-RAY EXAM CHEST 1 VIEW: CPT

## 2025-02-07 PROCEDURE — 99283 EMERGENCY DEPT VISIT LOW MDM: CPT

## 2025-02-07 RX ORDER — AZITHROMYCIN 250 MG/1
500 TABLET, FILM COATED ORAL ONCE
Status: COMPLETED | OUTPATIENT
Start: 2025-02-07 | End: 2025-02-07

## 2025-02-07 RX ORDER — AZITHROMYCIN 250 MG/1
250 TABLET, FILM COATED ORAL DAILY
Qty: 4 TABLET | Refills: 0 | Status: SHIPPED | OUTPATIENT
Start: 2025-02-08 | End: 2025-02-12

## 2025-02-07 RX ORDER — FLUTICASONE PROPIONATE 50 MCG
2 SPRAY, SUSPENSION (ML) NASAL DAILY
Qty: 16 G | Refills: 0 | Status: SHIPPED | OUTPATIENT
Start: 2025-02-07 | End: 2025-02-12 | Stop reason: SDUPTHER

## 2025-02-07 RX ADMIN — AZITHROMYCIN 500 MG: 250 TABLET, FILM COATED ORAL at 19:41

## 2025-02-07 NOTE — ED PROVIDER NOTES
EMERGENCY DEPARTMENT ENCOUNTER    Room Number:  30/30  PCP: Hilario Huang APRN  Historian: Patient    I initially evaluated the patient at 1822    HPI:  Chief Complaint: Right earache, hearing loss in right ear, cough, congestion  A complete HPI/ROS/PMH/PSH/SH/FH are unobtainable due to: Nothing  Context: Danielle Torres is a 74 y.o. female with a medical history of hypertension, hyperlipidemia who presents to the ED c/o acute right earache, cough, congestion, and right-sided hearing loss.  Patient has had nasal congestion and a productive cough for the past week.  She saw her PCP 1 week ago and was given a prescription for Augmentin.  She started taking this 5 days ago.  Flu and COVID tests were negative at that time.  She began having a right-sided earache yesterday.  When she woke up this morning, she noticed that she had muffled hearing on the right.  Denies ear drainage, fever, chest pain, shortness of breath, abdominal pain, vomiting, or diarrhea            PAST MEDICAL HISTORY  Active Ambulatory Problems     Diagnosis Date Noted    Essential hypertension     Hyperlipidemia     Allergic rhinitis 05/13/2016    Idiopathic chronic gout 02/18/2017    Class 1 obesity due to excess calories with serious comorbidity and body mass index (BMI) of 32.0 to 32.9 in adult 01/05/2018    Post-menopausal 04/02/2024    Type 2 diabetes mellitus without complication, without long-term current use of insulin 04/02/2024    Hx of colonoscopy with polypectomy 04/02/2024    Herpes zoster without complication 05/17/2024    Acute cough 01/31/2025    Upper respiratory tract infection 01/31/2025     Resolved Ambulatory Problems     Diagnosis Date Noted    Gastroesophageal reflux disease without esophagitis 05/02/2016    Prediabetes 01/05/2018    Sore throat 06/07/2022    Acute maxillary sinusitis 06/07/2022    Non-recurrent acute serous otitis media of left ear 06/07/2022    Intraocular hemorrhage of left eye 06/07/2022     Colon cancer screening 2024    Right wrist pain 2024    Right forearm pain 2024    Colon cancer screening 2024     Past Medical History:   Diagnosis Date    Gout     Hypertension          PAST SURGICAL HISTORY  Past Surgical History:   Procedure Laterality Date    BREAST AUGMENTATION      BREAST SURGERY      fatty tissue removed from breast    COLONOSCOPY N/A 2016    Procedure: COLONOSCOPY to cecum and TI with hot snare polypectomies and cold polypectomy;  Surgeon: Marcy Aly MD;  Location: [facility] ENDOSCOPY;  Service:     CYST REMOVAL      Ganglian on right hand    REDUCTION MAMMAPLASTY Bilateral     over 10 years ago    STOMACH SURGERY      Tummy tuck    TUBAL ABDOMINAL LIGATION           FAMILY HISTORY  Family History   Problem Relation Age of Onset    Diabetes Mother     Heart disease Mother     Hypertension Mother     Stroke Mother     Breast cancer Neg Hx     Ovarian cancer Neg Hx          SOCIAL HISTORY  Social History     Socioeconomic History    Marital status:    Tobacco Use    Smoking status: Some Days     Current packs/day: 0.00     Types: Cigarettes     Last attempt to quit: 2000     Years since quittin.7    Smokeless tobacco: Never   Vaping Use    Vaping status: Never Used   Substance and Sexual Activity    Alcohol use: No    Drug use: No         ALLERGIES  Latex    REVIEW OF SYSTEMS  Review of Systems  Included in HPI  All systems reviewed and negative except for those discussed in HPI.      PHYSICAL EXAM  ED Triage Vitals [25 1818]   Temp Heart Rate Resp BP SpO2   97 °F (36.1 °C) 86 16 -- 100 %      Temp src Heart Rate Source Patient Position BP Location FiO2 (%)   Tympanic Monitor -- -- --       Physical Exam      GENERAL: Awake, alert, oriented x 3.  Well-developed, well-nourished and nontoxic-appearing elderly female.  Resting comfortably in no acute distress  HENT: NCAT, nares patent, oropharynx is benign, left TM is normal, right TM is  dull and there is an effusion, bilateral EACs are normal sinus tenderness, no mastoid tenderness, no temporal artery tenderness  EYES: no scleral icterus  CV: regular rhythm, normal rate  RESPIRATORY: normal effort, clear to auscultation bilaterally  ABDOMEN: soft, nontender  MUSCULOSKELETAL: Extremities are nontender with full range of motion  NEURO: Speech is normal.  No facial droop.  Patient can hear finger rubbing bilaterally but reports that it is diminished in the right ear  PSYCH:  calm, cooperative  SKIN: warm, dry    Vital signs and nursing notes reviewed.          LAB RESULTS  No results found for this or any previous visit (from the past 24 hours).    Ordered the above labs and reviewed the results.        RADIOLOGY  XR Chest 1 View    Result Date: 2/7/2025  XR CHEST 1 VW-  HISTORY: Female who is 74 years-old, cough  TECHNIQUE: Frontal view of the chest  COMPARISON: None available  FINDINGS: Heart, mediastinum and pulmonary vasculature are unremarkable. Small patchy densities right mid to lower lung may be developing infiltrate, follow-up advised. No pleural effusion, or pneumothorax. No acute osseous process.      As described.  This report was finalized on 2/7/2025 7:19 PM by Dr. Anton Gr M.D on Workstation: [id number]       Ordered the above noted radiological studies. Reviewed by me in PACS.            PROCEDURES  Procedures        OUTPATIENT MEDICATION MANAGEMENT:  No current Epic-ordered facility-administered medications on file.     Current Outpatient Medications Ordered in Epic   Medication Sig Dispense Refill    fluticasone (FLONASE) 50 MCG/ACT nasal spray Administer 2 sprays into the nostril(s) as directed by provider Daily. 16 g 0    allopurinol (ZYLOPRIM) 100 MG tablet Take 1 tablet by mouth Daily. 90 tablet 0    amLODIPine (NORVASC) 10 MG tablet Take 1 tablet by mouth Daily. 90 tablet 1    amoxicillin-clavulanate (Augmentin) 500-125 MG per tablet Take 1 tablet by mouth 3 (Three) Times  a Day. 30 tablet 0    aspirin 81 MG EC tablet Take 1 tablet by mouth Daily. 90 tablet 3    atorvastatin (LIPITOR) 10 MG tablet Take 1 tablet by mouth every night at bedtime. 90 tablet 1    [START ON 2/8/2025] azithromycin (ZITHROMAX) 250 MG tablet Take 1 tablet by mouth Daily. 4 tablet 0    cetirizine (zyrTEC) 10 MG tablet Take 1 tablet by mouth Every Night. 30 tablet 5    CHERRY PO Take  by mouth.      Coenzyme Q10 (CO Q 10 PO) Take 200 Int'l Units by mouth.      Fish Oil-Cholecalciferol (FISH OIL + D3 PO) Take  by mouth.      glucose blood test strip Twice Daily (Patient not taking: Reported on 1/31/2025) 100 each 5    guaiFENesin (Mucinex) 600 MG 12 hr tablet Take 2 tablets by mouth 2 (Two) Times a Day for 10 days. Prn congestion. 20 tablet 0    Lancets Ultra Thin 30G misc Inject 1 each under the skin into the appropriate area as directed 2 (Two) Times a Day. (Patient not taking: Reported on 1/31/2025) 100 each 5    Misc Natural Products (YUMVS BEET ROOT-TART CHERRY PO) Take  by mouth.      vitamin B-12 (CYANOCOBALAMIN) 100 MCG tablet Take 0.5 tablets by mouth Daily.             MEDICATIONS GIVEN IN ER  Medications   azithromycin (ZITHROMAX) tablet 500 mg (500 mg Oral Given 2/7/25 1941)                   MEDICAL DECISION MAKING, PROGRESS, and CONSULTS    All labs have been independently reviewed by me.  All radiology studies have been reviewed by me and I have also reviewed the radiology report.   EKG's independently viewed and interpreted by me.  Discussion below represents my analysis of pertinent findings related to patient's condition, differential diagnosis, treatment plan and final disposition.      Additional sources:    - Discussed/ obtained information from independent historians: Spouse at bedside    - External (non-ED) record review:     -Prescription drug monitoring program review:     N/A    - Chronic or social conditions impacting patient care (Social Determinants of Health): None          Orders  placed during this visit:  Orders Placed This Encounter   Procedures    XR Chest 1 View         Additional orders considered but not ordered:          Differential diagnosis includes, but is not limited to:    Otitis media, cerumen impaction, effusion      Independent interpretation of labs, radiology studies, and discussions with consultants:  ED Course as of 02/07/25 2117 Fri Feb 07, 2025 1821 Temp: 97 °F (36.1 °C) [WH]   1821 Heart Rate: 86 [WH]   1821 Resp: 16 [WH]   1821 SpO2: 100 % [WH]   1927 Chest x-ray personally interpreted by me.  My personal interpretation is: Heart size is normal.  There are vague densities in the right lower lung.  No large pleural effusion. [WH]   1934 Patient is resting and breathing comfortably.  Oxygen saturation is 100% on room air.  Chest x-ray shows suspected pneumonia.  She has been on Augmentin for 5 days.  We we will add Z-Lei.  She will also be given a prescription for Flonase. [WH]   1945 MDM: Patient presented to the ED complaining of cough, right earache, decreased hearing in the right ear.  She was started on Augmentin 5 days ago.  She was not febrile or hypoxic.  Chest x-ray showed a possible pneumonia.,  She had a right ear effusion.  She will be started on Zithromax to cover her for potential atypical pneumonia.  She was breathing comfortably and is appropriate for outpatient treatment. [WH]      ED Course User Index  [WH] Sabas Barrientos MD         COMPLEXITY OF CARE        DIAGNOSIS  Final diagnoses:   Pneumonia of right lower lobe due to infectious organism   Right otitis media with effusion         DISPOSITION  DISCHARGE    Patient discharged in stable condition.    Reviewed implications of results, diagnosis, meds, responsibility to follow up, warning signs and symptoms of possible worsening, potential complications and reasons to return to ER, including worsening/persistent symptoms fever, shortness of breath, chest pain, or other  concern.    Patient/Family voiced understanding of above instructions.    Discussed plan for discharge, as there is no emergent indication for admission. Patient referred to primary care provider for BP management due to today's BP. Pt/family is agreeable and understands need for follow up and repeat testing.  Pt is aware that discharge does not mean that nothing is wrong but it indicates no emergency is present that requires admission and they must continue care with follow-up as given below or physician of their choice.     FOLLOW-UP  Hilario Huang, APRN  [address] [facility] [address]  [facility] [id number] [phone]    Schedule an appointment as soon as possible for a visit   If symptoms persist         Medication List        New Prescriptions      azithromycin 250 MG tablet  Commonly known as: ZITHROMAX  Take 1 tablet by mouth Daily.  Start taking on: February 8, 2025               Where to Get Your Medications        These medications were sent to [id number] - [place], [place] - [address] - [phone]  - [phone]   [address] [place] [id number]      Phone: [phone]   azithromycin 250 MG tablet  fluticasone 50 MCG/ACT nasal spray                   Latest Documented Vital Signs:  AS OF 21:17 EST VITALS:    BP - 145/84  HR - 86  TEMP - 97 °F (36.1 °C) (Tympanic)  O2 SATS - 100%            --    Please note that portions of this were completed with a voice recognition program.       Note Disclaimer: At [facility], we believe that sharing information builds trust and better relationships. You are receiving this note because you are receiving care at [facility] or recently visited. It is possible you will see health information before a provider has talked with you about it. This kind of information can be easy to misunderstand. To help you fully understand what it means for your health, we urge you to discuss this note with your provider.              Sabas Barrientos MD  02/07/25 0658

## 2025-02-07 NOTE — TELEPHONE ENCOUNTER
Caller: Danielle Torres    Relationship: Self    Best call back number: [phone]     What was the call regarding: PATIENT STATES SHE HAD A COLD FOR THE LAST FEW WEEKS. SHE HAS HAD A COUGH AND A LOT OF PHGLEM. SHE STATES SHE WOKE UP THIS MORNING UNABLE TO HEAR OUT OF ONE OF HER EARS. SHE WOULD LIKE TO SPEAK TO SOMEONE ABOUT THIS

## 2025-02-08 NOTE — DISCHARGE INSTRUCTIONS
Continue taking Augmentin.  Take azithromycin and Flonase as prescribed.  Return to the emergency department for worsening/persistent symptoms, fever, trouble breathing, vomiting, or other concern.  Follow-up with your primary care provider if symptoms do not improve in the next 3 to 4 days.

## 2025-02-12 ENCOUNTER — OFFICE VISIT (OUTPATIENT)
Dept: FAMILY MEDICINE CLINIC | Facility: CLINIC | Age: 75
End: 2025-02-12
Payer: MEDICARE

## 2025-02-12 VITALS
HEART RATE: 83 BPM | WEIGHT: 210 LBS | SYSTOLIC BLOOD PRESSURE: 132 MMHG | RESPIRATION RATE: 16 BRPM | OXYGEN SATURATION: 95 % | BODY MASS INDEX: 32.96 KG/M2 | DIASTOLIC BLOOD PRESSURE: 76 MMHG | HEIGHT: 67 IN | TEMPERATURE: 98.3 F

## 2025-02-12 DIAGNOSIS — E78.00 PURE HYPERCHOLESTEROLEMIA: Chronic | ICD-10-CM

## 2025-02-12 DIAGNOSIS — E11.9 TYPE 2 DIABETES MELLITUS WITHOUT COMPLICATION, WITHOUT LONG-TERM CURRENT USE OF INSULIN: Primary | ICD-10-CM

## 2025-02-12 DIAGNOSIS — J06.9 UPPER RESPIRATORY TRACT INFECTION, UNSPECIFIED TYPE: ICD-10-CM

## 2025-02-12 DIAGNOSIS — J30.2 SEASONAL ALLERGIC RHINITIS, UNSPECIFIED TRIGGER: ICD-10-CM

## 2025-02-12 DIAGNOSIS — M1A.0410 IDIOPATHIC CHRONIC GOUT OF RIGHT HAND WITHOUT TOPHUS: ICD-10-CM

## 2025-02-12 DIAGNOSIS — I10 ESSENTIAL HYPERTENSION: Chronic | ICD-10-CM

## 2025-02-12 PROCEDURE — 3078F DIAST BP <80 MM HG: CPT | Performed by: STUDENT IN AN ORGANIZED HEALTH CARE EDUCATION/TRAINING PROGRAM

## 2025-02-12 PROCEDURE — 1126F AMNT PAIN NOTED NONE PRSNT: CPT | Performed by: STUDENT IN AN ORGANIZED HEALTH CARE EDUCATION/TRAINING PROGRAM

## 2025-02-12 PROCEDURE — 99214 OFFICE O/P EST MOD 30 MIN: CPT | Performed by: STUDENT IN AN ORGANIZED HEALTH CARE EDUCATION/TRAINING PROGRAM

## 2025-02-12 PROCEDURE — 3075F SYST BP GE 130 - 139MM HG: CPT | Performed by: STUDENT IN AN ORGANIZED HEALTH CARE EDUCATION/TRAINING PROGRAM

## 2025-02-12 RX ORDER — FLUTICASONE PROPIONATE 50 MCG
2 SPRAY, SUSPENSION (ML) NASAL DAILY
Qty: 16 G | Refills: 0 | Status: SHIPPED | OUTPATIENT
Start: 2025-02-12

## 2025-02-12 RX ORDER — ATORVASTATIN CALCIUM 10 MG/1
10 TABLET, FILM COATED ORAL
Qty: 90 TABLET | Refills: 1 | Status: SHIPPED | OUTPATIENT
Start: 2025-02-12

## 2025-02-12 RX ORDER — CETIRIZINE HYDROCHLORIDE 10 MG/1
10 TABLET ORAL NIGHTLY
Qty: 30 TABLET | Refills: 5 | Status: SHIPPED | OUTPATIENT
Start: 2025-02-12

## 2025-02-12 RX ORDER — AMLODIPINE BESYLATE 10 MG/1
10 TABLET ORAL DAILY
Qty: 90 TABLET | Refills: 1 | Status: SHIPPED | OUTPATIENT
Start: 2025-02-12

## 2025-02-12 NOTE — ASSESSMENT & PLAN NOTE
Continue cetirizine and Flonase.  Ear symptoms probably related to sinus pressure.  Instructed patient to call and inform PCP in 1 month for ENT referral, if her ear symptoms do not resolve, patient voiced understanding.

## 2025-02-12 NOTE — ASSESSMENT & PLAN NOTE
Lipid abnormalities are stable    Plan:  Continue same medication/s without change.      Discussed medication dosage, use, side effects, and goals of treatment in detail.    Counseled patient on lifestyle modifications to help control hyperlipidemia.     Patient Treatment Goals:   LDL goal is less than 70    Followup in 6 months.    Discussed the importance of healthy diet, nutrition, and lifestyle. Recommend low salt, fat/cholesterol diet and avoid concentrated sweets. Encouraged DASH diet along with fresh fruits & vegetables and low fat dairy products. Counseled patient to exercise/walk as tolerated. Avoid tobacco and alcohol use.

## 2025-02-12 NOTE — ASSESSMENT & PLAN NOTE
Patient had a single gout attack in her entire life.  Educated patient on low purine diet.  Will stop allopurinol at this time and recheck uric acid level on follow-up visit and monitor for gout symptoms.  Return to PCP for any gout attacks.

## 2025-02-12 NOTE — PROGRESS NOTES
"Chief Complaint  Hospital Follow Up Visit (Admitted on 02/07 for pneumonia; lost hearing but not fully)    Subjective        Danielle Torres presents to [facility]  History of Present Illness  74-year-old -American female here with her  for chronic disease management follow-up visit.  Patient does consent to speak about her health history and treatment plan in front of her  in the exam room.    Patient status post ER visit since last clinic visit for persistent URI symptoms and was given a Z-Lei.  Patient reports her symptoms have nearly resolved except for mild muffling in her right ear.    Patient states she has just had 1 gout attack that involves her thumb, in her entire life and was started on allopurinol.  Patient does report eating sardines and other foods that may have precipitated gouty attack at that time.  Discussed low purine diet instructed with patient and her after visit summary, patient was understanding.      Objective   Vital Signs:  /76 (BP Location: Left arm, Patient Position: Sitting, Cuff Size: Adult)   Pulse 83   Temp 98.3 °F (36.8 °C) (Temporal)   Resp 16   Ht 170.3 cm (67.05\")   Wt 95.3 kg (210 lb)   SpO2 95%   BMI 32.84 kg/m²   Estimated body mass index is 32.84 kg/m² as calculated from the following:    Height as of this encounter: 170.3 cm (67.05\").    Weight as of this encounter: 95.3 kg (210 lb).               Physical Exam  Constitutional:       Appearance: Normal appearance.   HENT:      Head: Normocephalic and atraumatic.      Right Ear: Tympanic membrane, ear canal and external ear normal.      Left Ear: Tympanic membrane, ear canal and external ear normal.      Mouth/Throat:      Mouth: Mucous membranes are moist.      Pharynx: Oropharynx is clear. No oropharyngeal exudate or posterior oropharyngeal erythema.   Eyes:      Conjunctiva/sclera: Conjunctivae normal.   Cardiovascular:      Rate and Rhythm: Normal rate and " regular rhythm.      Heart sounds: Normal heart sounds.   Pulmonary:      Effort: Pulmonary effort is normal. No respiratory distress.      Breath sounds: Normal breath sounds. No stridor. No wheezing, rhonchi or rales.   Abdominal:      General: Bowel sounds are normal.      Palpations: Abdomen is soft.      Comments: Non-tender   Lymphadenopathy:      Cervical: No cervical adenopathy.   Skin:     General: Skin is warm.   Neurological:      General: No focal deficit present.      Mental Status: She is alert and oriented to person, place, and time.   Psychiatric:         Mood and Affect: Mood normal.         Behavior: Behavior normal.        Result Review :    The following data was reviewed by: JACKLYN Gerard on 02/12/2025:  CMP          4/2/2024    15:11   CMP   Glucose 99    BUN 14    Creatinine 0.92    Sodium 144    Potassium 4.4    Chloride 104    Calcium 9.8    Total Protein 7.0    Albumin 4.5    Globulin 2.5    Total Bilirubin 0.4    Alkaline Phosphatase 71    AST (SGOT) 21    ALT (SGPT) 27    BUN/Creatinine Ratio 15      CBC          4/2/2024    15:11   CBC   WBC 4.6    RBC 4.67    Hemoglobin 12.5    Hematocrit 38.7    MCV 83    MCH 26.8    MCHC 32.3    RDW 14.2    Platelets 272      Lipid Panel          4/2/2024    15:11   Lipid Panel   Total Cholesterol 166    Triglycerides 89    HDL Cholesterol 60    VLDL Cholesterol 16    LDL Cholesterol  90        A1C Last 3 Results          4/2/2024    15:11 8/12/2024    14:37   HGBA1C Last 3 Results   Hemoglobin A1C 6.3  6.3      Microalbumin          4/2/2024    15:11 8/12/2024    14:37   Microalbumin   Microalbumin, Urine 8.4  <3.0        Data reviewed : Recent hospitalization notes reviewed recent ER summary for presentation to ER for cough and respiratory symptoms and patient was started on Z-Lei.  Patient states she is status post Z-Lei at this time.             Assessment and Plan     Diagnoses and all orders for this visit:    1. Type 2 diabetes  mellitus without complication, without long-term current use of insulin (Primary)  Assessment & Plan:  Diabetes is stable.   Continue current treatment regimen.  Reminded to bring in blood sugar diary at next visit.  Recommended an ADA diet.  Recommended a Mediterranean style of eating  Regular aerobic exercise.  Discussed ways to avoid symptomatic hypoglycemia.  Discussed sick day management.  Discussed foot care.  Reminded to get yearly retinal exam.  Diabetes will be reassessed in 3 months    Discussed diabetes management and need for adequate BS control. Educated patient high A1c puts him/her at risk for MI, CVA, CKD, gastroparesis, foot ulcers, and frequent infections.   Pt informed of Rx for glucometer/test strips, and lancets as needed and explained to keep a blood sugar log. Return to office as instructed. Additionally diet compliance explained - avoid sugar candy, soda, high carbohydrate loads. Explained how losing weight can improve your health and achieve better blood sugar control. Being active can also help prevent or control the disorder. Recommended annual opthalmology follow -up due to diagnosis of diabetes. Annual Podiatry visit prn.      Orders:  -     Hemoglobin A1c  -     Microalbumin / Creatinine Urine Ratio - Urine, Clean Catch    2. Seasonal allergic rhinitis, unspecified trigger  Assessment & Plan:  Continue cetirizine and Flonase.  Ear symptoms probably related to sinus pressure.  Instructed patient to call and inform PCP in 1 month for ENT referral, if her ear symptoms do not resolve, patient voiced understanding.    Orders:  -     fluticasone (FLONASE) 50 MCG/ACT nasal spray; Administer 2 sprays into the nostril(s) as directed by provider Daily.  Dispense: 16 g; Refill: 0  -     cetirizine (zyrTEC) 10 MG tablet; Take 1 tablet by mouth Every Night.  Dispense: 30 tablet; Refill: 5    3. Pure hypercholesterolemia  Assessment & Plan:   Lipid abnormalities are stable    Plan:  Continue same  medication/s without change.      Discussed medication dosage, use, side effects, and goals of treatment in detail.    Counseled patient on lifestyle modifications to help control hyperlipidemia.     Patient Treatment Goals:   LDL goal is less than 70    Followup in 6 months.    Discussed the importance of healthy diet, nutrition, and lifestyle. Recommend low salt, fat/cholesterol diet and avoid concentrated sweets. Encouraged DASH diet along with fresh fruits & vegetables and low fat dairy products. Counseled patient to exercise/walk as tolerated. Avoid tobacco and alcohol use.      Orders:  -     atorvastatin (LIPITOR) 10 MG tablet; Take 1 tablet by mouth every night at bedtime.  Dispense: 90 tablet; Refill: 1    4. Essential hypertension  Assessment & Plan:  Hypertension is stable and controlled  Continue current treatment regimen.  Dietary sodium restriction.  Weight loss.  Regular aerobic exercise.  Ambulatory blood pressure monitoring.  Blood pressure will be reassessed in 3 months.    Discussed the importance of healthy diet, nutrition, and lifestyle. Recommend low salt, fat/cholesterol diet and avoid concentrated sweets. Encouraged DASH diet along with fresh fruits & vegetables and low fat dairy products. Counseled patient to exercise/walk as tolerated. Avoid tobacco and alcohol use.      Orders:  -     amLODIPine (NORVASC) 10 MG tablet; Take 1 tablet by mouth Daily.  Dispense: 90 tablet; Refill: 1    5. 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  Educated patient on low purine diet.  Will stop allopurinol at this time and recheck uric acid level on follow-up visit and monitor for gout symptoms.  Return to PCP for any gout attacks.    Orders:  -     Uric Acid    6. Upper respiratory tract infection, unspecified type  Assessment & Plan:  Near resolution.          All chronic conditions have been addressed and treated by the  practice or other specialists. Medications have been reconciled and refilled as appropriate. Reiterated compliance and timely follow up appointments. Side effects of all new and old medications reviewed with the patient and patient willing to accept all risks involved. Advised RTO if no improvement or worsening of symptoms or if any new complaints arise. Patient advised to follow up with clinic or call after diagnostic tests, if patient does not hear from office 3 days after the test completion.          Follow Up     Return in about 3 months (around 5/12/2025) for Next scheduled follow up.  Patient was given instructions and counseling regarding her condition or for health maintenance advice. Please see specific information pulled into the AVS if appropriate.

## 2025-02-12 NOTE — ASSESSMENT & PLAN NOTE
Diabetes is stable.   Continue current treatment regimen.  Reminded to bring in blood sugar diary at next visit.  Recommended an ADA diet.  Recommended a Mediterranean style of eating  Regular aerobic exercise.  Discussed ways to avoid symptomatic hypoglycemia.  Discussed sick day management.  Discussed foot care.  Reminded to get yearly retinal exam.  Diabetes will be reassessed in 3 months    Discussed diabetes management and need for adequate BS control. Educated patient high A1c puts him/her at risk for MI, CVA, CKD, gastroparesis, foot ulcers, and frequent infections.   Pt informed of Rx for glucometer/test strips, and lancets as needed and explained to keep a blood sugar log. Return to office as instructed. Additionally diet compliance explained - avoid sugar candy, soda, high carbohydrate loads. Explained how losing weight can improve your health and achieve better blood sugar control. Being active can also help prevent or control the disorder. Recommended annual opthalmology follow -up due to diagnosis of diabetes. Annual Podiatry visit prn.

## 2025-02-13 LAB
ALBUMIN/CREAT UR: 6 MG/G CREAT (ref 0–29)
CREAT UR-MCNC: 235.5 MG/DL
HBA1C MFR BLD: 6.5 % (ref 4.8–5.6)
MICROALBUMIN UR-MCNC: 15.3 UG/ML
URATE SERPL-MCNC: 4 MG/DL (ref 3.1–7.9)

## 2025-02-14 ENCOUNTER — TELEPHONE (OUTPATIENT)
Dept: FAMILY MEDICINE CLINIC | Facility: CLINIC | Age: 75
End: 2025-02-14
Payer: MEDICARE

## 2025-02-14 NOTE — TELEPHONE ENCOUNTER
----- Message from Hilario Huang sent at 2/14/2025 10:50 AM EST -----  A1c was essentially stable and urine was negative for any protein. Uric acid levl was normal.    Recommend low fat, low cholesterol, low salt, and low sugar diet along with fresh fruits and vegetables. Also recommend walking for exercise 20 to 30 minutes per day about 5 days per week.

## 2025-03-20 DIAGNOSIS — M1A.0410 IDIOPATHIC CHRONIC GOUT OF RIGHT HAND WITHOUT TOPHUS: Chronic | ICD-10-CM

## 2025-03-20 RX ORDER — ALLOPURINOL 100 MG/1
100 TABLET ORAL DAILY
Qty: 90 TABLET | Refills: 0 | Status: SHIPPED | OUTPATIENT
Start: 2025-03-20

## 2025-05-12 ENCOUNTER — OFFICE VISIT (OUTPATIENT)
Dept: FAMILY MEDICINE CLINIC | Facility: CLINIC | Age: 75
End: 2025-05-12
Payer: MEDICARE

## 2025-05-12 VITALS
BODY MASS INDEX: 33.4 KG/M2 | RESPIRATION RATE: 16 BRPM | HEIGHT: 67 IN | WEIGHT: 212.8 LBS | DIASTOLIC BLOOD PRESSURE: 82 MMHG | OXYGEN SATURATION: 97 % | SYSTOLIC BLOOD PRESSURE: 140 MMHG | HEART RATE: 72 BPM | TEMPERATURE: 98.6 F

## 2025-05-12 DIAGNOSIS — E78.00 PURE HYPERCHOLESTEROLEMIA: Chronic | ICD-10-CM

## 2025-05-12 DIAGNOSIS — E11.9 TYPE 2 DIABETES MELLITUS WITHOUT COMPLICATION, WITHOUT LONG-TERM CURRENT USE OF INSULIN: Chronic | ICD-10-CM

## 2025-05-12 DIAGNOSIS — I10 ESSENTIAL HYPERTENSION: Chronic | ICD-10-CM

## 2025-05-12 DIAGNOSIS — Z78.0 POST-MENOPAUSAL: ICD-10-CM

## 2025-05-12 DIAGNOSIS — J30.2 SEASONAL ALLERGIC RHINITIS, UNSPECIFIED TRIGGER: ICD-10-CM

## 2025-05-12 DIAGNOSIS — M1A.0410 IDIOPATHIC CHRONIC GOUT OF RIGHT HAND WITHOUT TOPHUS: Primary | ICD-10-CM

## 2025-05-12 PROBLEM — R05.1 ACUTE COUGH: Status: RESOLVED | Noted: 2025-01-31 | Resolved: 2025-05-12

## 2025-05-12 PROBLEM — J06.9 UPPER RESPIRATORY TRACT INFECTION: Status: RESOLVED | Noted: 2025-01-31 | Resolved: 2025-05-12

## 2025-05-12 PROCEDURE — 3077F SYST BP >= 140 MM HG: CPT | Performed by: STUDENT IN AN ORGANIZED HEALTH CARE EDUCATION/TRAINING PROGRAM

## 2025-05-12 PROCEDURE — G2211 COMPLEX E/M VISIT ADD ON: HCPCS | Performed by: STUDENT IN AN ORGANIZED HEALTH CARE EDUCATION/TRAINING PROGRAM

## 2025-05-12 PROCEDURE — 1160F RVW MEDS BY RX/DR IN RCRD: CPT | Performed by: STUDENT IN AN ORGANIZED HEALTH CARE EDUCATION/TRAINING PROGRAM

## 2025-05-12 PROCEDURE — 1159F MED LIST DOCD IN RCRD: CPT | Performed by: STUDENT IN AN ORGANIZED HEALTH CARE EDUCATION/TRAINING PROGRAM

## 2025-05-12 PROCEDURE — 1126F AMNT PAIN NOTED NONE PRSNT: CPT | Performed by: STUDENT IN AN ORGANIZED HEALTH CARE EDUCATION/TRAINING PROGRAM

## 2025-05-12 PROCEDURE — 3078F DIAST BP <80 MM HG: CPT | Performed by: STUDENT IN AN ORGANIZED HEALTH CARE EDUCATION/TRAINING PROGRAM

## 2025-05-12 PROCEDURE — 99214 OFFICE O/P EST MOD 30 MIN: CPT | Performed by: STUDENT IN AN ORGANIZED HEALTH CARE EDUCATION/TRAINING PROGRAM

## 2025-05-12 PROCEDURE — 3044F HG A1C LEVEL LT 7.0%: CPT | Performed by: STUDENT IN AN ORGANIZED HEALTH CARE EDUCATION/TRAINING PROGRAM

## 2025-05-12 RX ORDER — BLOOD-GLUCOSE METER
1 KIT MISCELLANEOUS DAILY
Qty: 1 EACH | Refills: 0 | Status: SHIPPED | OUTPATIENT
Start: 2025-05-12 | End: 2025-06-11

## 2025-05-12 RX ORDER — CETIRIZINE HYDROCHLORIDE 10 MG/1
5 TABLET ORAL NIGHTLY
Qty: 90 TABLET | Refills: 3 | Status: SHIPPED | OUTPATIENT
Start: 2025-05-12

## 2025-05-12 RX ORDER — ASPIRIN 81 MG/1
81 TABLET ORAL DAILY
Qty: 90 TABLET | Refills: 3 | Status: SHIPPED | OUTPATIENT
Start: 2025-05-12

## 2025-05-12 RX ORDER — BISMUTH SUBSALICYLATE 262MG/15ML
1 SUSPENSION, ORAL (FINAL DOSE FORM) ORAL 2 TIMES DAILY
Qty: 100 EACH | Refills: 5 | Status: SHIPPED | OUTPATIENT
Start: 2025-05-12 | End: 2025-05-12

## 2025-05-12 RX ORDER — CETIRIZINE HYDROCHLORIDE 10 MG/1
10 TABLET ORAL NIGHTLY
Qty: 30 TABLET | Refills: 5 | Status: SHIPPED | OUTPATIENT
Start: 2025-05-12 | End: 2025-05-12 | Stop reason: SDUPTHER

## 2025-05-12 RX ORDER — AMLODIPINE BESYLATE 10 MG/1
10 TABLET ORAL DAILY
Qty: 90 TABLET | Refills: 1 | Status: SHIPPED | OUTPATIENT
Start: 2025-05-12

## 2025-05-12 RX ORDER — ATORVASTATIN CALCIUM 10 MG/1
10 TABLET, FILM COATED ORAL
Qty: 90 TABLET | Refills: 1 | Status: SHIPPED | OUTPATIENT
Start: 2025-05-12

## 2025-05-12 RX ORDER — BISMUTH SUBSALICYLATE 262MG/15ML
1 SUSPENSION, ORAL (FINAL DOSE FORM) ORAL 2 TIMES DAILY
Qty: 100 EACH | Refills: 5 | Status: SHIPPED | OUTPATIENT
Start: 2025-05-12

## 2025-05-12 RX ORDER — ELECTROLYTES/DEXTROSE
1 SOLUTION, ORAL ORAL DAILY
Qty: 90 TABLET | Refills: 3 | Status: SHIPPED | OUTPATIENT
Start: 2025-05-12 | End: 2026-05-07

## 2025-05-12 RX ORDER — FLUTICASONE PROPIONATE 50 MCG
2 SPRAY, SUSPENSION (ML) NASAL DAILY
Qty: 16 G | Refills: 0 | Status: SHIPPED | OUTPATIENT
Start: 2025-05-12

## 2025-05-12 NOTE — ASSESSMENT & PLAN NOTE
Lipid abnormalities are  recheck fasting lipid panel    Plan:  Continue same medication/s without change.      Discussed medication dosage, use, side effects, and goals of treatment in detail.    Counseled patient on lifestyle modifications to help control hyperlipidemia.     Patient Treatment Goals:   LDL goal is under 100    Followup in 6 months.  Discussed the importance of healthy diet, nutrition, and lifestyle. Recommend low salt, fat/cholesterol diet and avoid concentrated sweets. Encouraged DASH diet along with fresh fruits & vegetables and low fat dairy products. Counseled patient to exercise/walk as tolerated. Avoid tobacco and alcohol use.

## 2025-05-12 NOTE — PROGRESS NOTES
"Chief Complaint  Diabetes and Hypertension    Subjective        Danielle Torres presents to [facility]  History of Present Illness  75-year-old -American female here for chron disease management follow-up visit.  Patient reports she is doing well in general and does not have any new concerns or complaints.      Instructed patient to get the adult preventive immunization that are due at  the pharmacy, including - shingles.      Instructed patient to follow-up with an eye doctor, either opthalmology or optometry with preference for opthalmology, for an annual diabetic eye exam.    Discussed the diagnosis of gout and prescription for allopurinol with patient today.  Patient states she has had only 1 gout attack in her life and after her first gout attack she was started on allopurinol.  I have discussed low purine diet with patient today and instructed patient to read her after visit summary from today.  Also instructed patient to follow a  low purine diet going forwards, patient voiced understanding.  Informed patient will get uric acid level today and stop allopurinol, patient agreeable to the plan..  Diabetes  Hypertension      Objective   Vital Signs:  /82 (BP Location: Left arm, Patient Position: Sitting, Cuff Size: Adult)   Pulse 72   Temp 98.6 °F (37 °C) (Temporal)   Resp 16   Ht 170.3 cm (67.05\")   Wt 96.5 kg (212 lb 12.8 oz)   SpO2 97%   BMI 33.28 kg/m²   Estimated body mass index is 33.28 kg/m² as calculated from the following:    Height as of this encounter: 170.3 cm (67.05\").    Weight as of this encounter: 96.5 kg (212 lb 12.8 oz).       BMI is >= 30 and <35. (Class 1 Obesity). The following options were offered after discussion;: exercise counseling/recommendations and nutrition counseling/recommendations      Physical Exam  Constitutional:       Appearance: Normal appearance.   HENT:      Head: Normocephalic and atraumatic.   Eyes:      Conjunctiva/sclera: " Conjunctivae normal.   Cardiovascular:      Rate and Rhythm: Normal rate and regular rhythm.      Heart sounds: Normal heart sounds.   Pulmonary:      Effort: Pulmonary effort is normal.      Breath sounds: Normal breath sounds.   Abdominal:      General: Bowel sounds are normal.      Palpations: Abdomen is soft.      Comments: Non-tender   Skin:     General: Skin is warm.   Neurological:      General: No focal deficit present.      Mental Status: She is alert and oriented to person, place, and time.   Psychiatric:         Mood and Affect: Mood normal.         Behavior: Behavior normal.        Result Review :    The following data was reviewed by: JACKLYN Gerard on 05/12/2025:          A1C Last 3 Results          8/12/2024    14:37 2/12/2025    14:07   HGBA1C Last 3 Results   Hemoglobin A1C 6.3  6.5      Microalbumin          8/12/2024    14:37 2/12/2025    14:07   Microalbumin   Microalbumin, Urine <3.0  15.3                     Assessment and Plan     Diagnoses and all orders for this visit:    1. 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  Will obtain uric acid level today.  Educated patient on low purine diet.  Instructed patient to monitor for any gout attacks and return to clinic immediately.    Instructed patient to read up on low purine diet in her after visit summary from today.    Orders:  -     Uric acid    2. Type 2 diabetes mellitus without complication, without long-term current use of insulin  Assessment & Plan:  Diabetes is stable.   Continue current treatment regimen.  Reminded to bring in blood sugar diary at next visit.  Recommended an ADA diet.  Recommended a Mediterranean style of eating  Regular aerobic exercise.  Discussed ways to avoid symptomatic hypoglycemia.  Discussed sick day management.  Discussed foot care.  Reminded to get yearly retinal exam.  Diabetes will be reassessed in 3 months    Discussed diabetes  management and need for adequate BS control. Educated patient high A1c puts him/her at risk for MI, CVA, CKD, gastroparesis, foot ulcers, and frequent infections.   Pt informed of Rx for glucometer/test strips, and lancets as needed and explained to keep a blood sugar log. Return to office as instructed. Additionally diet compliance explained - avoid sugar candy, soda, high carbohydrate loads. Explained how losing weight can improve your health and achieve better blood sugar control. Being active can also help prevent or control the disorder. Recommended annual opthalmology follow -up due to diagnosis of diabetes. Annual Podiatry visit prn.      Orders:  -     Discontinue: Lancets Ultra Thin 30G misc; Inject 1 each under the skin into the appropriate area as directed 2 (Two) Times a Day.  Dispense: 100 each; Refill: 5  -     Discontinue: glucose blood test strip; Twice Daily  Dispense: 100 each; Refill: 5  -     CBC & Differential  -     Lipid Panel  -     Hemoglobin A1c  -     TSH  -     T4, free  -     Vitamin B12  -     C-Peptide  -     Discontinue: glucose blood test strip; Twice Daily  Dispense: 100 each; Refill: 5  -     glucose monitor monitoring kit; Use 1 each Daily for 30 days. DX- DM-2  Dispense: 1 each; Refill: 0  -     Discontinue: Lancets Ultra Thin 30G misc; Inject 1 each under the skin into the appropriate area as directed 2 (Two) Times a Day.  Dispense: 100 each; Refill: 5  -     Urinalysis With Microscopic - Urine, Clean Catch  -     Microalbumin / Creatinine Urine Ratio - Urine, Clean Catch  -     Discontinue: glucose blood test strip; Twice Daily  Dispense: 100 each; Refill: 5  -     Lancets Ultra Thin 30G misc; Inject 1 each under the skin into the appropriate area as directed 2 (Two) Times a Day.  Dispense: 100 each; Refill: 5  -     glucose blood test strip; Twice Daily  Dispense: 100 each; Refill: 5    3. Seasonal allergic rhinitis, unspecified trigger  Assessment & Plan:  Continue cetirizine  and Flonase    Orders:  -     fluticasone (FLONASE) 50 MCG/ACT nasal spray; Administer 2 sprays into the nostril(s) as directed by provider Daily.  Dispense: 16 g; Refill: 0  -     Discontinue: cetirizine (zyrTEC) 10 MG tablet; Take 1 tablet by mouth Every Night.  Dispense: 30 tablet; Refill: 5  -     cetirizine (zyrTEC) 10 MG tablet; Take 0.5 tablets by mouth Every Night.  Dispense: 90 tablet; Refill: 3    4. Pure hypercholesterolemia  Assessment & Plan:   Lipid abnormalities are  recheck fasting lipid panel    Plan:  Continue same medication/s without change.      Discussed medication dosage, use, side effects, and goals of treatment in detail.    Counseled patient on lifestyle modifications to help control hyperlipidemia.     Patient Treatment Goals:   LDL goal is under 100    Followup in 6 months.  Discussed the importance of healthy diet, nutrition, and lifestyle. Recommend low salt, fat/cholesterol diet and avoid concentrated sweets. Encouraged DASH diet along with fresh fruits & vegetables and low fat dairy products. Counseled patient to exercise/walk as tolerated. Avoid tobacco and alcohol use.      Orders:  -     atorvastatin (LIPITOR) 10 MG tablet; Take 1 tablet by mouth every night at bedtime.  Dispense: 90 tablet; Refill: 1  -     aspirin 81 MG EC tablet; Take 1 tablet by mouth Daily.  Dispense: 90 tablet; Refill: 3  -     Comprehensive Metabolic Panel  -     Lipid Panel    5. Essential hypertension  Assessment & Plan:  Hypertension is stable and controlled  Continue current treatment regimen.  Dietary sodium restriction.  Weight loss.  Regular aerobic exercise.  Ambulatory blood pressure monitoring.  Blood pressure will be reassessed in 3 months.  Discussed the importance of healthy diet, nutrition, and lifestyle. Recommend low salt, fat/cholesterol diet and avoid concentrated sweets. Encouraged DASH diet along with fresh fruits & vegetables and low fat dairy products. Counseled patient to exercise/walk  as tolerated. Avoid tobacco and alcohol use.      Orders:  -     amLODIPine (NORVASC) 10 MG tablet; Take 1 tablet by mouth Daily.  Dispense: 90 tablet; Refill: 1  -     Urinalysis With Microscopic - Urine, Clean Catch  -     Microalbumin / Creatinine Urine Ratio - Urine, Clean Catch    6. Post-menopausal  -     DEXA Bone Density Axial; Future    Other orders  -     multivitamin with minerals (Multivitamin Adult) tablet tablet; Take 1 tablet by mouth Daily for 360 days.  Dispense: 90 tablet; Refill: 3    Instructed patient to read the after visit summary that will be provided at checkout today, for educational information and recommendations for the medical conditions that patient had presented with today and also any chronic conditions.    All chronic conditions have been addressed and treated by the practice or other specialists. Medications have been reconciled and refilled as appropriate. Reiterated compliance and timely follow up appointments. Side effects of all new and old medications reviewed with the patient and patient willing to accept all risks involved. Advised RTO if no improvement or worsening of symptoms or if any new complaints arise. Patient advised to follow up with clinic or call after diagnostic tests, if patient does not hear from office 3 days after the test completion.            Follow Up     Return in about 3 months (around 8/12/2025) for Next scheduled follow up.  Patient was given instructions and counseling regarding her condition or for health maintenance advice. Please see specific information pulled into the AVS if appropriate.

## 2025-05-12 NOTE — ASSESSMENT & PLAN NOTE
Stop Allopurinol.  Will obtain uric acid level today.  Educated patient on low purine diet.  Instructed patient to monitor for any gout attacks and return to clinic immediately.    Instructed patient to read up on low purine diet in her after visit summary from today.

## 2025-05-13 ENCOUNTER — RESULTS FOLLOW-UP (OUTPATIENT)
Dept: FAMILY MEDICINE CLINIC | Facility: CLINIC | Age: 75
End: 2025-05-13

## 2025-05-13 LAB
ALBUMIN/CREAT UR: 10 MG/G CREAT (ref 0–29)
APPEARANCE UR: CLEAR
BACTERIA #/AREA URNS HPF: ABNORMAL /HPF
BILIRUB UR QL STRIP: NEGATIVE
CASTS URNS MICRO: ABNORMAL
COLOR UR: YELLOW
CREAT UR-MCNC: 313.8 MG/DL
EPI CELLS #/AREA URNS HPF: ABNORMAL /HPF
GLUCOSE UR QL STRIP: NEGATIVE
HGB UR QL STRIP: NEGATIVE
KETONES UR QL STRIP: ABNORMAL
LEUKOCYTE ESTERASE UR QL STRIP: NEGATIVE
MICROALBUMIN UR-MCNC: 31.7 UG/ML
NITRITE UR QL STRIP: NEGATIVE
PH UR STRIP: 5.5 [PH] (ref 5–8)
PROT UR QL STRIP: ABNORMAL
RBC #/AREA URNS HPF: ABNORMAL /HPF
SP GR UR STRIP: 1.03 (ref 1–1.03)
UROBILINOGEN UR STRIP-MCNC: ABNORMAL MG/DL
WBC #/AREA URNS HPF: ABNORMAL /HPF

## 2025-06-27 DIAGNOSIS — M1A.0410 IDIOPATHIC CHRONIC GOUT OF RIGHT HAND WITHOUT TOPHUS: Chronic | ICD-10-CM

## 2025-06-27 RX ORDER — ALLOPURINOL 100 MG/1
100 TABLET ORAL DAILY
Qty: 90 TABLET | Refills: 1 | Status: SHIPPED | OUTPATIENT
Start: 2025-06-27

## 2025-08-12 ENCOUNTER — OFFICE VISIT (OUTPATIENT)
Dept: FAMILY MEDICINE CLINIC | Facility: CLINIC | Age: 75
End: 2025-08-12
Payer: MEDICARE

## 2025-08-12 VITALS
HEIGHT: 67 IN | DIASTOLIC BLOOD PRESSURE: 74 MMHG | WEIGHT: 209 LBS | TEMPERATURE: 98.5 F | BODY MASS INDEX: 32.8 KG/M2 | OXYGEN SATURATION: 99 % | SYSTOLIC BLOOD PRESSURE: 138 MMHG | HEART RATE: 81 BPM

## 2025-08-12 DIAGNOSIS — E11.9 TYPE 2 DIABETES MELLITUS WITHOUT COMPLICATION, WITHOUT LONG-TERM CURRENT USE OF INSULIN: Chronic | ICD-10-CM

## 2025-08-12 DIAGNOSIS — I10 ESSENTIAL HYPERTENSION: Primary | Chronic | ICD-10-CM

## 2025-08-12 DIAGNOSIS — M1A.0410 IDIOPATHIC CHRONIC GOUT OF RIGHT HAND WITHOUT TOPHUS: Chronic | ICD-10-CM

## 2025-08-12 DIAGNOSIS — Z78.0 POST-MENOPAUSAL: ICD-10-CM

## 2025-08-12 DIAGNOSIS — J30.2 SEASONAL ALLERGIC RHINITIS, UNSPECIFIED TRIGGER: ICD-10-CM

## 2025-08-12 DIAGNOSIS — E78.00 PURE HYPERCHOLESTEROLEMIA: Chronic | ICD-10-CM

## 2025-08-12 RX ORDER — ALLOPURINOL 100 MG/1
100 TABLET ORAL DAILY
Qty: 90 TABLET | Refills: 1 | Status: SHIPPED | OUTPATIENT
Start: 2025-08-12

## 2025-08-12 RX ORDER — FLUTICASONE PROPIONATE 50 MCG
2 SPRAY, SUSPENSION (ML) NASAL DAILY
Qty: 16 G | Refills: 0 | Status: SHIPPED | OUTPATIENT
Start: 2025-08-12

## 2025-08-12 RX ORDER — ASPIRIN 81 MG/1
81 TABLET ORAL DAILY
Qty: 90 TABLET | Refills: 3 | Status: SHIPPED | OUTPATIENT
Start: 2025-08-12

## 2025-08-12 RX ORDER — MULTIVIT-MIN/IRON/FOLIC ACID/K 18-600-40
1 CAPSULE ORAL DAILY
Qty: 90 CAPSULE | Refills: 3 | Status: SHIPPED | OUTPATIENT
Start: 2025-08-12

## 2025-08-12 RX ORDER — ATORVASTATIN CALCIUM 10 MG/1
10 TABLET, FILM COATED ORAL
Qty: 90 TABLET | Refills: 2 | Status: SHIPPED | OUTPATIENT
Start: 2025-08-12

## 2025-08-12 RX ORDER — AMLODIPINE BESYLATE 10 MG/1
10 TABLET ORAL DAILY
Qty: 90 TABLET | Refills: 2 | Status: SHIPPED | OUTPATIENT
Start: 2025-08-12

## 2025-08-12 RX ORDER — BLOOD-GLUCOSE METER
EACH MISCELLANEOUS
COMMUNITY
Start: 2025-05-12

## 2025-08-12 RX ORDER — ELECTROLYTES/DEXTROSE
1 SOLUTION, ORAL ORAL DAILY
Qty: 90 TABLET | Refills: 3 | Status: SHIPPED | OUTPATIENT
Start: 2025-08-12 | End: 2026-08-07

## 2025-08-13 LAB
ALBUMIN SERPL-MCNC: 4.1 G/DL (ref 3.8–4.8)
ALP SERPL-CCNC: 63 IU/L (ref 44–121)
ALT SERPL-CCNC: 30 IU/L (ref 0–32)
AST SERPL-CCNC: 24 IU/L (ref 0–40)
BASOPHILS # BLD AUTO: 0 X10E3/UL (ref 0–0.2)
BASOPHILS NFR BLD AUTO: 1 %
BILIRUB SERPL-MCNC: 0.5 MG/DL (ref 0–1.2)
BUN SERPL-MCNC: 11 MG/DL (ref 8–27)
BUN/CREAT SERPL: 12 (ref 12–28)
C PEPTIDE SERPL-MCNC: 2.8 NG/ML (ref 1.1–4.4)
CALCIUM SERPL-MCNC: 8.8 MG/DL (ref 8.7–10.3)
CHLORIDE SERPL-SCNC: 105 MMOL/L (ref 96–106)
CHOLEST SERPL-MCNC: 142 MG/DL (ref 100–199)
CO2 SERPL-SCNC: 22 MMOL/L (ref 20–29)
CREAT SERPL-MCNC: 0.9 MG/DL (ref 0.57–1)
EGFRCR SERPLBLD CKD-EPI 2021: 67 ML/MIN/1.73
EOSINOPHIL # BLD AUTO: 0.1 X10E3/UL (ref 0–0.4)
EOSINOPHIL NFR BLD AUTO: 1 %
ERYTHROCYTE [DISTWIDTH] IN BLOOD BY AUTOMATED COUNT: 14.3 % (ref 11.7–15.4)
GLOBULIN SER CALC-MCNC: 2.7 G/DL (ref 1.5–4.5)
GLUCOSE SERPL-MCNC: 97 MG/DL (ref 70–99)
HBA1C MFR BLD: 6.4 % (ref 4.8–5.6)
HCT VFR BLD AUTO: 37.7 % (ref 34–46.6)
HDLC SERPL-MCNC: 54 MG/DL
HGB BLD-MCNC: 11.8 G/DL (ref 11.1–15.9)
IMM GRANULOCYTES # BLD AUTO: 0 X10E3/UL (ref 0–0.1)
IMM GRANULOCYTES NFR BLD AUTO: 0 %
LDLC SERPL CALC-MCNC: 71 MG/DL (ref 0–99)
LYMPHOCYTES # BLD AUTO: 1 X10E3/UL (ref 0.7–3.1)
LYMPHOCYTES NFR BLD AUTO: 25 %
MCH RBC QN AUTO: 26.9 PG (ref 26.6–33)
MCHC RBC AUTO-ENTMCNC: 31.3 G/DL (ref 31.5–35.7)
MCV RBC AUTO: 86 FL (ref 79–97)
MONOCYTES # BLD AUTO: 0.5 X10E3/UL (ref 0.1–0.9)
MONOCYTES NFR BLD AUTO: 11 %
NEUTROPHILS # BLD AUTO: 2.5 X10E3/UL (ref 1.4–7)
NEUTROPHILS NFR BLD AUTO: 62 %
PLATELET # BLD AUTO: 256 X10E3/UL (ref 150–450)
POTASSIUM SERPL-SCNC: 3.8 MMOL/L (ref 3.5–5.2)
PROT SERPL-MCNC: 6.8 G/DL (ref 6–8.5)
RBC # BLD AUTO: 4.39 X10E6/UL (ref 3.77–5.28)
SODIUM SERPL-SCNC: 144 MMOL/L (ref 134–144)
T4 FREE SERPL-MCNC: 1.31 NG/DL (ref 0.82–1.77)
TRIGL SERPL-MCNC: 92 MG/DL (ref 0–149)
TSH SERPL DL<=0.005 MIU/L-ACNC: 0.82 UIU/ML (ref 0.45–4.5)
URATE SERPL-MCNC: 5.1 MG/DL (ref 3.1–7.9)
VIT B12 SERPL-MCNC: >2000 PG/ML (ref 232–1245)
VLDLC SERPL CALC-MCNC: 17 MG/DL (ref 5–40)
WBC # BLD AUTO: 4 X10E3/UL (ref 3.4–10.8)